# Patient Record
Sex: FEMALE | Race: WHITE | NOT HISPANIC OR LATINO
[De-identification: names, ages, dates, MRNs, and addresses within clinical notes are randomized per-mention and may not be internally consistent; named-entity substitution may affect disease eponyms.]

---

## 2017-01-03 ENCOUNTER — PRIOR ORIGINAL RECORDS (OUTPATIENT)
Dept: OTHER | Age: 72
End: 2017-01-03

## 2017-01-06 ENCOUNTER — HOSPITAL ENCOUNTER (OUTPATIENT)
Dept: LAB | Facility: HOSPITAL | Age: 72
Discharge: HOME | End: 2017-01-06
Attending: INTERNAL MEDICINE
Payer: MEDICARE

## 2017-01-06 ENCOUNTER — PRIOR ORIGINAL RECORDS (OUTPATIENT)
Dept: OTHER | Age: 72
End: 2017-01-06

## 2017-01-06 LAB
BUN BLD-MCNC: 37 MG/DL (ref 8–20)
CALCIUM BLD-MCNC: 8.7 MG/DL (ref 8.3–10.3)
CHLORIDE: 110 MMOL/L (ref 101–111)
CO2: 26 MMOL/L (ref 22–32)
CREAT BLD-MCNC: 1 MG/DL (ref 0.55–1.02)
GLUCOSE BLD-MCNC: 112 MG/DL (ref 70–99)
POTASSIUM SERPL-SCNC: 4.6 MMOL/L (ref 3.6–5.1)
SODIUM SERPL-SCNC: 144 MMOL/L (ref 136–144)

## 2017-01-06 PROCEDURE — 36415 COLL VENOUS BLD VENIPUNCTURE: CPT | Performed by: INTERNAL MEDICINE

## 2017-01-06 PROCEDURE — 80048 BASIC METABOLIC PNL TOTAL CA: CPT | Performed by: INTERNAL MEDICINE

## 2017-01-10 ENCOUNTER — PRIOR ORIGINAL RECORDS (OUTPATIENT)
Dept: OTHER | Age: 72
End: 2017-01-10

## 2017-01-10 LAB
BUN: 37 MG/DL
CALCIUM: 8.7 MG/DL
CHLORIDE: 110 MEQ/L
CREATININE, SERUM: 1 MG/DL
GLUCOSE: 112 MG/DL
POTASSIUM, SERUM: 4.6 MEQ/L
SODIUM: 144 MEQ/L

## 2017-01-23 ENCOUNTER — PRIOR ORIGINAL RECORDS (OUTPATIENT)
Dept: OTHER | Age: 72
End: 2017-01-23

## 2017-01-23 ENCOUNTER — HOSPITAL ENCOUNTER (OUTPATIENT)
Dept: ULTRASOUND IMAGING | Facility: HOSPITAL | Age: 72
Discharge: HOME | End: 2017-01-23
Attending: INTERNAL MEDICINE
Payer: MEDICARE

## 2017-01-23 DIAGNOSIS — R60.9 EDEMA: ICD-10-CM

## 2017-01-23 DIAGNOSIS — M79.606 LEG PAIN: ICD-10-CM

## 2017-01-23 PROCEDURE — 93971 EXTREMITY STUDY: CPT

## 2017-02-01 ENCOUNTER — HOSPITAL ENCOUNTER (OUTPATIENT)
Dept: LAB | Facility: HOSPITAL | Age: 72
Discharge: HOME | End: 2017-02-01
Attending: FAMILY MEDICINE
Payer: MEDICARE

## 2017-02-01 DIAGNOSIS — Z00.00 ROUTINE GENERAL MEDICAL EXAMINATION AT A HEALTH CARE FACILITY: Primary | ICD-10-CM

## 2017-02-01 LAB
25-HYDROXYVITAMIN D (TOTAL): 46.6 NG/ML (ref 30–100)
ALBUMIN SERPL-MCNC: 3.7 G/DL (ref 3.5–4.8)
ALP LIVER SERPL-CCNC: 191 U/L (ref 55–142)
ALT SERPL-CCNC: 48 U/L (ref 14–54)
AST SERPL-CCNC: 34 U/L (ref 15–41)
BILIRUB SERPL-MCNC: 0.8 MG/DL (ref 0.1–2)
BUN BLD-MCNC: 26 MG/DL (ref 8–20)
CALCIUM BLD-MCNC: 9.5 MG/DL (ref 8.3–10.3)
CHLORIDE: 98 MMOL/L (ref 101–111)
CHOLEST SMN-MCNC: 184 MG/DL (ref ?–200)
CO2: 29 MMOL/L (ref 22–32)
CREAT BLD-MCNC: 0.9 MG/DL (ref 0.55–1.02)
CREAT UR-SCNC: 35.6 MG/DL
EST. AVERAGE GLUCOSE BLD GHB EST-MCNC: 154 MG/DL (ref 68–126)
GLUCOSE BLD-MCNC: 171 MG/DL (ref 70–99)
HBA1C MFR BLD HPLC: 7 % (ref ?–5.7)
HDLC SERPL-MCNC: 62 MG/DL (ref 45–?)
HDLC SERPL: 2.97 {RATIO} (ref ?–4.44)
LDLC SERPL CALC-MCNC: 107 MG/DL (ref ?–130)
M PROTEIN MFR SERPL ELPH: 6.9 G/DL (ref 6.1–8.3)
MICROALBUMIN UR-MCNC: 8.17 MG/DL
MICROALBUMIN/CREAT 24H UR-RTO: 229.5 UG/MG (ref ?–30)
NONHDLC SERPL-MCNC: 122 MG/DL (ref ?–130)
POTASSIUM SERPL-SCNC: 4.6 MMOL/L (ref 3.6–5.1)
SODIUM SERPL-SCNC: 133 MMOL/L (ref 136–144)
TRIGLYCERIDES: 75 MG/DL (ref ?–150)
VLDL: 15 MG/DL (ref 5–40)

## 2017-02-01 PROCEDURE — 82570 ASSAY OF URINE CREATININE: CPT | Performed by: FAMILY MEDICINE

## 2017-02-01 PROCEDURE — 82306 VITAMIN D 25 HYDROXY: CPT | Performed by: FAMILY MEDICINE

## 2017-02-01 PROCEDURE — 83036 HEMOGLOBIN GLYCOSYLATED A1C: CPT | Performed by: FAMILY MEDICINE

## 2017-02-01 PROCEDURE — 80053 COMPREHEN METABOLIC PANEL: CPT | Performed by: FAMILY MEDICINE

## 2017-02-01 PROCEDURE — 82043 UR ALBUMIN QUANTITATIVE: CPT | Performed by: FAMILY MEDICINE

## 2017-02-01 PROCEDURE — 36415 COLL VENOUS BLD VENIPUNCTURE: CPT | Performed by: FAMILY MEDICINE

## 2017-02-01 PROCEDURE — 80061 LIPID PANEL: CPT | Performed by: FAMILY MEDICINE

## 2017-02-02 ENCOUNTER — TELEPHONE (OUTPATIENT)
Dept: FAMILY MEDICINE CLINIC | Facility: CLINIC | Age: 72
End: 2017-02-02

## 2017-02-06 ENCOUNTER — OFFICE VISIT (OUTPATIENT)
Dept: FAMILY MEDICINE CLINIC | Facility: CLINIC | Age: 72
End: 2017-02-06

## 2017-02-06 VITALS
TEMPERATURE: 99 F | SYSTOLIC BLOOD PRESSURE: 166 MMHG | BODY MASS INDEX: 30 KG/M2 | HEART RATE: 84 BPM | HEIGHT: 62 IN | DIASTOLIC BLOOD PRESSURE: 64 MMHG | WEIGHT: 163 LBS | RESPIRATION RATE: 20 BRPM

## 2017-02-06 DIAGNOSIS — E11.59 TYPE 2 DIABETES MELLITUS WITH OTHER CIRCULATORY COMPLICATION, WITH LONG-TERM CURRENT USE OF INSULIN (HCC): Primary | ICD-10-CM

## 2017-02-06 DIAGNOSIS — I10 ESSENTIAL HYPERTENSION: ICD-10-CM

## 2017-02-06 DIAGNOSIS — I73.9 PERIPHERAL VASCULAR DISEASE OF LOWER EXTREMITY (HCC): ICD-10-CM

## 2017-02-06 DIAGNOSIS — I25.810 CORONARY ARTERY DISEASE INVOLVING CORONARY BYPASS GRAFT OF NATIVE HEART WITHOUT ANGINA PECTORIS: ICD-10-CM

## 2017-02-06 DIAGNOSIS — Z79.4 TYPE 2 DIABETES MELLITUS WITH OTHER CIRCULATORY COMPLICATION, WITH LONG-TERM CURRENT USE OF INSULIN (HCC): Primary | ICD-10-CM

## 2017-02-06 DIAGNOSIS — Z98.890 H/O PERIPHERAL ARTERY BYPASS: ICD-10-CM

## 2017-02-06 PROCEDURE — 99214 OFFICE O/P EST MOD 30 MIN: CPT | Performed by: FAMILY MEDICINE

## 2017-02-06 RX ORDER — CEPHALEXIN 500 MG/1
CAPSULE ORAL
Refills: 0 | COMMUNITY
Start: 2017-01-23 | End: 2017-04-27

## 2017-02-06 RX ORDER — HYDROCHLOROTHIAZIDE 25 MG/1
TABLET ORAL
Refills: 0 | COMMUNITY
Start: 2017-01-23 | End: 2017-04-20

## 2017-02-06 RX ORDER — IRBESARTAN 300 MG/1
150 TABLET ORAL DAILY
Refills: 3 | COMMUNITY
Start: 2017-01-16 | End: 2017-08-28

## 2017-02-10 PROBLEM — H43.812 PVD (POSTERIOR VITREOUS DETACHMENT), LEFT: Status: ACTIVE | Noted: 2017-02-10

## 2017-02-24 ENCOUNTER — PRIOR ORIGINAL RECORDS (OUTPATIENT)
Dept: OTHER | Age: 72
End: 2017-02-24

## 2017-03-03 ENCOUNTER — HOSPITAL ENCOUNTER (OUTPATIENT)
Dept: CARDIOLOGY CLINIC | Facility: HOSPITAL | Age: 72
Discharge: HOME | End: 2017-03-03
Attending: INTERNAL MEDICINE

## 2017-03-03 DIAGNOSIS — I73.9 PERIPHERAL VASCULAR DISEASE, UNSPECIFIED (HCC): ICD-10-CM

## 2017-03-07 ENCOUNTER — MYAURORA ACCOUNT LINK (OUTPATIENT)
Dept: OTHER | Age: 72
End: 2017-03-07

## 2017-03-07 ENCOUNTER — PRIOR ORIGINAL RECORDS (OUTPATIENT)
Dept: OTHER | Age: 72
End: 2017-03-07

## 2017-03-08 ENCOUNTER — MED REC SCAN ONLY (OUTPATIENT)
Dept: FAMILY MEDICINE CLINIC | Facility: CLINIC | Age: 72
End: 2017-03-08

## 2017-03-14 ENCOUNTER — PRIOR ORIGINAL RECORDS (OUTPATIENT)
Dept: OTHER | Age: 72
End: 2017-03-14

## 2017-03-23 RX ORDER — INSULIN DETEMIR 100 [IU]/ML
INJECTION, SOLUTION SUBCUTANEOUS
Qty: 15 ML | Refills: 1 | Status: SHIPPED | OUTPATIENT
Start: 2017-03-23 | End: 2017-03-24

## 2017-04-20 ENCOUNTER — PRIOR ORIGINAL RECORDS (OUTPATIENT)
Dept: OTHER | Age: 72
End: 2017-04-20

## 2017-04-20 ENCOUNTER — HOSPITAL ENCOUNTER (OUTPATIENT)
Dept: GENERAL RADIOLOGY | Age: 72
Discharge: HOME | End: 2017-04-20
Attending: FAMILY MEDICINE
Payer: MEDICARE

## 2017-04-20 ENCOUNTER — OFFICE VISIT (OUTPATIENT)
Dept: FAMILY MEDICINE CLINIC | Facility: CLINIC | Age: 72
End: 2017-04-20

## 2017-04-20 VITALS
HEART RATE: 84 BPM | DIASTOLIC BLOOD PRESSURE: 80 MMHG | SYSTOLIC BLOOD PRESSURE: 138 MMHG | WEIGHT: 169.81 LBS | HEIGHT: 62 IN | BODY MASS INDEX: 31.25 KG/M2 | RESPIRATION RATE: 20 BRPM | TEMPERATURE: 99 F

## 2017-04-20 DIAGNOSIS — I70.229 EXTREMITY ATHEROSCLEROSIS WITH RESTING PAIN (HCC): ICD-10-CM

## 2017-04-20 DIAGNOSIS — M43.16 SPONDYLOLISTHESIS OF LUMBAR REGION: ICD-10-CM

## 2017-04-20 DIAGNOSIS — Z91.81 AT RISK FOR FALLING: ICD-10-CM

## 2017-04-20 DIAGNOSIS — Z98.890 H/O PERIPHERAL ARTERY BYPASS: ICD-10-CM

## 2017-04-20 DIAGNOSIS — I10 ESSENTIAL HYPERTENSION: ICD-10-CM

## 2017-04-20 DIAGNOSIS — G89.4 CHRONIC PAIN SYNDROME: ICD-10-CM

## 2017-04-20 DIAGNOSIS — M79.7 FIBROMYALGIA: ICD-10-CM

## 2017-04-20 DIAGNOSIS — I73.9 CLAUDICATION (HCC): ICD-10-CM

## 2017-04-20 DIAGNOSIS — R06.02 SOB (SHORTNESS OF BREATH): ICD-10-CM

## 2017-04-20 DIAGNOSIS — I25.810 CORONARY ARTERY DISEASE INVOLVING CORONARY BYPASS GRAFT OF NATIVE HEART WITHOUT ANGINA PECTORIS: ICD-10-CM

## 2017-04-20 DIAGNOSIS — I89.0 LYMPHEDEMA: ICD-10-CM

## 2017-04-20 DIAGNOSIS — E66.9 OBESITY (BMI 30.0-34.9): ICD-10-CM

## 2017-04-20 DIAGNOSIS — I87.8 VENOUS STASIS: ICD-10-CM

## 2017-04-20 DIAGNOSIS — E11.3292 MILD NONPROLIFERATIVE DIABETIC RETINOPATHY OF LEFT EYE WITHOUT MACULAR EDEMA ASSOCIATED WITH TYPE 2 DIABETES MELLITUS (HCC): ICD-10-CM

## 2017-04-20 DIAGNOSIS — I25.10 CORONARY ARTERY DISEASE INVOLVING NATIVE CORONARY ARTERY OF NATIVE HEART WITHOUT ANGINA PECTORIS: ICD-10-CM

## 2017-04-20 DIAGNOSIS — I73.9 PERIPHERAL VASCULAR DISEASE OF LOWER EXTREMITY (HCC): ICD-10-CM

## 2017-04-20 DIAGNOSIS — I77.9 PAOD (PERIPHERAL ARTERIAL OCCLUSIVE DISEASE) (HCC): ICD-10-CM

## 2017-04-20 DIAGNOSIS — F32.0 MILD SINGLE CURRENT EPISODE OF MAJOR DEPRESSIVE DISORDER (HCC): ICD-10-CM

## 2017-04-20 DIAGNOSIS — H31.012: ICD-10-CM

## 2017-04-20 DIAGNOSIS — Z00.00 ENCOUNTER FOR ANNUAL HEALTH EXAMINATION: Primary | ICD-10-CM

## 2017-04-20 DIAGNOSIS — Z79.4 TYPE 2 DIABETES MELLITUS WITH DIABETIC NEUROPATHY, WITH LONG-TERM CURRENT USE OF INSULIN (HCC): ICD-10-CM

## 2017-04-20 DIAGNOSIS — Z96.1 PSEUDOPHAKIA: ICD-10-CM

## 2017-04-20 DIAGNOSIS — E08.51 DIABETES MELLITUS DUE TO UNDERLYING CONDITION WITH DIABETIC PERIPHERAL ANGIOPATHY WITHOUT GANGRENE, WITH LONG-TERM CURRENT USE OF INSULIN (HCC): ICD-10-CM

## 2017-04-20 DIAGNOSIS — Z12.31 ENCOUNTER FOR SCREENING MAMMOGRAM FOR HIGH-RISK PATIENT: ICD-10-CM

## 2017-04-20 DIAGNOSIS — N83.8 OVARIAN MASS: ICD-10-CM

## 2017-04-20 DIAGNOSIS — Z79.4 DIABETES MELLITUS DUE TO UNDERLYING CONDITION WITH DIABETIC PERIPHERAL ANGIOPATHY WITHOUT GANGRENE, WITH LONG-TERM CURRENT USE OF INSULIN (HCC): ICD-10-CM

## 2017-04-20 DIAGNOSIS — H43.812 PVD (POSTERIOR VITREOUS DETACHMENT), LEFT: ICD-10-CM

## 2017-04-20 DIAGNOSIS — E11.40 TYPE 2 DIABETES MELLITUS WITH DIABETIC NEUROPATHY, WITH LONG-TERM CURRENT USE OF INSULIN (HCC): ICD-10-CM

## 2017-04-20 DIAGNOSIS — M48.061 SPINAL STENOSIS OF LUMBAR REGION: ICD-10-CM

## 2017-04-20 DIAGNOSIS — R39.9 URINARY SYMPTOM OR SIGN: ICD-10-CM

## 2017-04-20 PROCEDURE — 81003 URINALYSIS AUTO W/O SCOPE: CPT | Performed by: FAMILY MEDICINE

## 2017-04-20 PROCEDURE — 99214 OFFICE O/P EST MOD 30 MIN: CPT | Performed by: FAMILY MEDICINE

## 2017-04-20 PROCEDURE — 87086 URINE CULTURE/COLONY COUNT: CPT | Performed by: FAMILY MEDICINE

## 2017-04-20 PROCEDURE — G0439 PPPS, SUBSEQ VISIT: HCPCS | Performed by: FAMILY MEDICINE

## 2017-04-20 PROCEDURE — 71020 XR CHEST PA + LAT CHEST (CPT=71020): CPT

## 2017-04-23 PROBLEM — I70.229 EXTREMITY ATHEROSCLEROSIS WITH RESTING PAIN (HCC): Status: ACTIVE | Noted: 2017-04-23

## 2017-04-23 PROBLEM — I77.9 PAOD (PERIPHERAL ARTERIAL OCCLUSIVE DISEASE) (HCC): Status: ACTIVE | Noted: 2017-04-23

## 2017-04-24 ENCOUNTER — HOSPITAL ENCOUNTER (OUTPATIENT)
Dept: LAB | Facility: HOSPITAL | Age: 72
Discharge: HOME | End: 2017-04-24
Attending: INTERNAL MEDICINE
Payer: MEDICARE

## 2017-04-24 ENCOUNTER — HOSPITAL ENCOUNTER (OUTPATIENT)
Dept: CV DIAGNOSTICS | Facility: HOSPITAL | Age: 72
Discharge: HOME | End: 2017-04-24
Attending: INTERNAL MEDICINE
Payer: MEDICARE

## 2017-04-24 PROCEDURE — 93010 ELECTROCARDIOGRAM REPORT: CPT | Performed by: INTERNAL MEDICINE

## 2017-04-24 PROCEDURE — 93005 ELECTROCARDIOGRAM TRACING: CPT

## 2017-04-24 NOTE — PATIENT INSTRUCTIONS
Anne Barger's SCREENING SCHEDULE   Tests on this list are recommended by your physician but may not be covered, or covered at this frequency, by your insurer. Please check with your insurance carrier before scheduling to verify coverage.    PREVENTATIVE ages 73-68) IPPE only No results found for this or any previous visit.  Limited to patients who meet one of the following criteria:   • Men who are 73-68 years old and have smoked more than 100 cigarettes in their lifetime   • Anyone with a family history least age 76, and as needed after 76 Mammogram,1 Yr due on 04/16/2017 Please get this Mammogram regularly   Immunizations      Influenza  Covered Annually No orders found for this or any previous visit.  Please get every year    Pneumococcal 13 (Prevnar)  C printer. (the forms are also available in [address])  [email or web address]  This link also has information from the [facility] regarding Advance Directives.

## 2017-04-24 NOTE — PROGRESS NOTES
HPI:   Alfonzo Reynaga is a 66year old female who presents for a Medicare Subsequent Annual Wellness visit (Pt already had Initial Annual Wellness).     Pt also here to follow up on DM, PVD, LE claudication and depression     Her last annual assessment has left eye without macular edema associated with type 2 diabetes mellitus ([facility] [address]- HgA1c at goal      PAOD (peripheral arterial occlusive disease) [facility])- sees cardiology      Extremity atherosclerosis with resting pain [facility])- sees cardiology       Last Cholest Tablet 24 Hr Take 1 tablet by mouth Daily Beta Blocker. Probiotic Product (PROBIOTIC DAILY OR) Take 2 capsules by mouth daily.       MEDICAL INFORMATION:   She  has a past medical history of BACK PAIN; DIABETES; HTN (hypertension) (7/11/2013); CAD (corona denies chest pain on exertion  GI: denies abdominal pain, denies heartburn  : denies dysuria, vaginal discharge or itching, no complaint of urinary incontinence   MUSCULOSKELETAL: denies back pain  NEURO: denies headaches  PSYCHE: denies depression or an 2+ and symmetric   Skin: Skin color, texture, turgor normal, no rashes or lesions   Lymph nodes: Cervical, supraclavicular, and axillary nodes normal   Neurologic: Normal    and Breasts:  normal appearance, no masses or tenderness, Inspection negative, No repeat labs   -     Detailed, Mod Complex (25198)    Fibromyalgia- stable ; monitor   -     Detailed, Mod Complex (91608)    Peripheral vascular disease of lower extremity ([facility] [address]- pt sees cardiology   -     Detailed, Mod Complex (07086)    H/O peripheral ar resting pain [facility])- pt sees cardiology   -     Detailed, Mod Complex (78856)    Obesity (BMI 30.0-34.9)- discussed diet and exercise   -     Detailed, Mod Complex (90355)         Ms. Wicho Gupta already takes aspirin and has it on her medication list.     Diet a Assessment     Do you have 3 or more medical conditions?: 1-Yes    Have you fallen in the last 12 months?: 1-Yes    Do you accidently lose urine?: 1-Yes    Do you have difficulty seeing?: 1-Yes    Do you have any difficulty walking or getting up?: 1-Yes Preventative Physical Exam only, or if medically necessary Electrocardiogram date10/25/2016       Colorectal Cancer Screening      Colonoscopy Screen every 10 years Colonoscopy,10 Years due on 04/13/2026 Update Health Maintenance if applicable    Flex Sigm with metal- TD and TDaP Not covered by Medicare Part B No vaccine history found This may be covered with your prescription benefits, but Medicare does not cover unless Medically needed    Zoster  Not covered by Medicare Part B No vaccine history found This

## 2017-04-25 ENCOUNTER — HOSPITAL ENCOUNTER (OUTPATIENT)
Dept: LAB | Facility: HOSPITAL | Age: 72
Discharge: HOME | End: 2017-04-25
Attending: INTERNAL MEDICINE
Payer: MEDICARE

## 2017-04-25 ENCOUNTER — PRIOR ORIGINAL RECORDS (OUTPATIENT)
Dept: OTHER | Age: 72
End: 2017-04-25

## 2017-04-25 PROCEDURE — 80061 LIPID PANEL: CPT | Performed by: INTERNAL MEDICINE

## 2017-04-25 PROCEDURE — 36415 COLL VENOUS BLD VENIPUNCTURE: CPT | Performed by: INTERNAL MEDICINE

## 2017-04-25 PROCEDURE — 85025 COMPLETE CBC W/AUTO DIFF WBC: CPT | Performed by: INTERNAL MEDICINE

## 2017-04-26 ENCOUNTER — PRIOR ORIGINAL RECORDS (OUTPATIENT)
Dept: OTHER | Age: 72
End: 2017-04-26

## 2017-04-26 LAB
CHOLESTEROL, TOTAL: 206 MG/DL
HDL CHOLESTEROL: 62 MG/DL
HEMATOCRIT: 38.6 %
HEMOGLOBIN: 12.6 G/DL
LDL CHOLESTEROL: 132 MG/DL
PLATELETS: 259 K/UL
RED BLOOD COUNT: 4.25 X 10-6/U
TRIGLYCERIDES: 61 MG/DL
WHITE BLOOD COUNT: 8.4 X 10-3/U

## 2017-05-01 ENCOUNTER — HOSPITAL ENCOUNTER (OUTPATIENT)
Dept: INTERVENTIONAL RADIOLOGY/VASCULAR | Facility: HOSPITAL | Age: 72
Discharge: HOME | End: 2017-05-01
Attending: INTERNAL MEDICINE | Admitting: INTERNAL MEDICINE
Payer: MEDICARE

## 2017-05-01 ENCOUNTER — PRIOR ORIGINAL RECORDS (OUTPATIENT)
Dept: OTHER | Age: 72
End: 2017-05-01

## 2017-05-01 VITALS
SYSTOLIC BLOOD PRESSURE: 135 MMHG | WEIGHT: 170 LBS | HEIGHT: 62 IN | OXYGEN SATURATION: 94 % | TEMPERATURE: 97 F | RESPIRATION RATE: 17 BRPM | DIASTOLIC BLOOD PRESSURE: 55 MMHG | HEART RATE: 87 BPM | BODY MASS INDEX: 31.28 KG/M2

## 2017-05-01 DIAGNOSIS — I73.9 PVD (PERIPHERAL VASCULAR DISEASE) (HCC): ICD-10-CM

## 2017-05-01 PROCEDURE — 99152 MOD SED SAME PHYS/QHP 5/>YRS: CPT

## 2017-05-01 PROCEDURE — 76942 ECHO GUIDE FOR BIOPSY: CPT

## 2017-05-01 PROCEDURE — 4A023N7 MEASUREMENT OF CARDIAC SAMPLING AND PRESSURE, LEFT HEART, PERCUTANEOUS APPROACH: ICD-10-PCS | Performed by: INTERNAL MEDICINE

## 2017-05-01 PROCEDURE — B211YZZ FLUOROSCOPY OF MULTIPLE CORONARY ARTERIES USING OTHER CONTRAST: ICD-10-PCS | Performed by: INTERNAL MEDICINE

## 2017-05-01 PROCEDURE — 80048 BASIC METABOLIC PNL TOTAL CA: CPT | Performed by: INTERNAL MEDICINE

## 2017-05-01 PROCEDURE — 93454 CORONARY ARTERY ANGIO S&I: CPT

## 2017-05-01 PROCEDURE — 36415 COLL VENOUS BLD VENIPUNCTURE: CPT

## 2017-05-01 PROCEDURE — B41DYZZ FLUOROSCOPY OF AORTA AND BILATERAL LOWER EXTREMITY ARTERIES USING OTHER CONTRAST: ICD-10-PCS | Performed by: INTERNAL MEDICINE

## 2017-05-01 PROCEDURE — B410YZZ FLUOROSCOPY OF ABDOMINAL AORTA USING OTHER CONTRAST: ICD-10-PCS | Performed by: INTERNAL MEDICINE

## 2017-05-01 PROCEDURE — 82962 GLUCOSE BLOOD TEST: CPT

## 2017-05-01 PROCEDURE — 75710 ARTERY X-RAYS ARM/LEG: CPT

## 2017-05-01 PROCEDURE — 36247 INS CATH ABD/L-EXT ART 3RD: CPT

## 2017-05-01 PROCEDURE — 99153 MOD SED SAME PHYS/QHP EA: CPT

## 2017-05-01 PROCEDURE — 93458 L HRT ARTERY/VENTRICLE ANGIO: CPT

## 2017-05-01 RX ORDER — SODIUM CHLORIDE 9 MG/ML
INJECTION, SOLUTION INTRAVENOUS CONTINUOUS
Status: DISCONTINUED | OUTPATIENT
Start: 2017-05-01 | End: 2017-05-01

## 2017-05-01 RX ORDER — HEPARIN SODIUM 5000 [USP'U]/ML
INJECTION, SOLUTION INTRAVENOUS; SUBCUTANEOUS
Status: COMPLETED
Start: 2017-05-01 | End: 2017-05-01

## 2017-05-01 RX ORDER — LIDOCAINE HYDROCHLORIDE 10 MG/ML
INJECTION, SOLUTION INFILTRATION; PERINEURAL
Status: COMPLETED
Start: 2017-05-01 | End: 2017-05-01

## 2017-05-01 RX ORDER — MIDAZOLAM HYDROCHLORIDE 1 MG/ML
INJECTION INTRAMUSCULAR; INTRAVENOUS
Status: COMPLETED
Start: 2017-05-01 | End: 2017-05-01

## 2017-05-01 RX ORDER — HYDRALAZINE HYDROCHLORIDE 20 MG/ML
INJECTION INTRAMUSCULAR; INTRAVENOUS
Status: COMPLETED
Start: 2017-05-01 | End: 2017-05-01

## 2017-05-01 RX ADMIN — SODIUM CHLORIDE: 9 INJECTION, SOLUTION INTRAVENOUS at 09:00:00

## 2017-05-01 NOTE — PROGRESS NOTES
Pt ambulated in kim, l groin remains c/d/i and soft. Discharge instructions reviewed per Cuca Ochoa. Pt without questions.  Discharged to home in stable condition

## 2017-05-01 NOTE — PROCEDURES
[facility]    PATIENT'S NAME: Darline Washburn   ATTENDING PHYSICIAN: Elizabeth Martínez M.D. OPERATING PHYSICIAN: Elizabeth Martínez M.D.    PATIENT ACCOUNT#:   [de-identified]    LOCATION:  [facility] EDWP 10  MEDICAL RECORD #:   [id number]       DATE OF BIR left main trifurcates into left anterior descending, circumflex, and ramus intermedius branches. Stents are noted from the ostium of the left anterior descending artery down to its midsection. These appeared patent without stenosis proximally.   In the mi valve.    ABDOMINAL ANGIOGRAPHY:  Abdominal angiography with a pigtail at the renal vessels revealed a mildly atherosclerotic aorta. Right and left common iliacs are widely patent. External iliacs are patent. Internal iliacs are patent.   The common femo iliac artery. Angiography revealed no significant external iliac disease or common femoral disease. As noted before, the superficial femoral is widely patent in its proximal and midsection. In its distal section, stenosis of about 30%.   There is also a

## 2017-05-01 NOTE — H&P
History & Physical Examination    Patient Name: Yves Contreras  MRN: [id number]  CSN: [id number]  YOB: 1945    Diagnosis: Abnl LE US and dyspnea. Present Illness: Pt with right fem pop and US, suggestive of outflow stenosis.   Dr. Inés Rodrigues has re Corazon*    Pain  Cortisone [Cortison*      Coumadin [Warfarin]         Comment:Pt says she developed sores  Gluten Flour            Other (See Comments)    Comment:Pt feels gluten elevates her blood sugar  Lyrica [Pregabalin]         Comment:Tremor, dizziness stent    ANGIOPLASTY (CORONARY)  5/12/15    Comment right popliteal    CATH DRUG ELUTING STENT      CATH PERCUTANEOUS  TRANSLUMINAL CORONARY ANGIOPLASTY      TONSILLECTOMY             Family History   Problem Relation Age of Onset   • Heart Disord

## 2017-05-01 NOTE — PROGRESS NOTES
Rc'd pt from cath lab in stable condition. VSS. Sheath to left groin intact. Pulled using sterile technique, manual pressure applied for 20 mins using a quick clot. Site is soft, clean and dry, no bleeding or hematoma. Pt on bedrest for 4hrs.  Denies c/o pa

## 2017-05-02 LAB
BUN: 28 MG/DL
CALCIUM: 9.1 MG/DL
CHLORIDE: 114 MEQ/L
CREATININE, SERUM: 0.81 MG/DL
GLUCOSE: 92 MG/DL
POTASSIUM, SERUM: 4.5 MEQ/L
SODIUM: 144 MEQ/L

## 2017-05-25 ENCOUNTER — HOSPITAL ENCOUNTER (OUTPATIENT)
Dept: LAB | Facility: HOSPITAL | Age: 72
Discharge: HOME | End: 2017-05-25
Attending: FAMILY MEDICINE
Payer: MEDICARE

## 2017-05-25 DIAGNOSIS — Z79.4 TYPE 2 DIABETES MELLITUS WITH DIABETIC NEUROPATHY, WITH LONG-TERM CURRENT USE OF INSULIN (HCC): ICD-10-CM

## 2017-05-25 DIAGNOSIS — R06.02 SOB (SHORTNESS OF BREATH): ICD-10-CM

## 2017-05-25 DIAGNOSIS — E11.40 TYPE 2 DIABETES MELLITUS WITH DIABETIC NEUROPATHY, WITH LONG-TERM CURRENT USE OF INSULIN (HCC): ICD-10-CM

## 2017-05-25 PROCEDURE — 83036 HEMOGLOBIN GLYCOSYLATED A1C: CPT

## 2017-05-25 PROCEDURE — 36415 COLL VENOUS BLD VENIPUNCTURE: CPT

## 2017-05-25 PROCEDURE — 80053 COMPREHEN METABOLIC PANEL: CPT

## 2017-05-30 ENCOUNTER — HOSPITAL ENCOUNTER (OUTPATIENT)
Dept: MAMMOGRAPHY | Facility: HOSPITAL | Age: 72
Discharge: HOME | End: 2017-05-30
Attending: FAMILY MEDICINE
Payer: MEDICARE

## 2017-05-30 ENCOUNTER — HOSPITAL ENCOUNTER (OUTPATIENT)
Dept: ULTRASOUND IMAGING | Facility: HOSPITAL | Age: 72
Discharge: HOME | End: 2017-05-30
Attending: FAMILY MEDICINE
Payer: MEDICARE

## 2017-05-30 DIAGNOSIS — N83.8 OVARIAN MASS: ICD-10-CM

## 2017-05-30 DIAGNOSIS — Z12.31 ENCOUNTER FOR SCREENING MAMMOGRAM FOR HIGH-RISK PATIENT: ICD-10-CM

## 2017-05-30 PROCEDURE — 77063 BREAST TOMOSYNTHESIS BI: CPT | Performed by: FAMILY MEDICINE

## 2017-05-30 PROCEDURE — 76856 US EXAM PELVIC COMPLETE: CPT | Performed by: FAMILY MEDICINE

## 2017-05-30 PROCEDURE — 76830 TRANSVAGINAL US NON-OB: CPT | Performed by: FAMILY MEDICINE

## 2017-05-30 PROCEDURE — 77067 SCR MAMMO BI INCL CAD: CPT | Performed by: FAMILY MEDICINE

## 2017-08-18 ENCOUNTER — TELEPHONE (OUTPATIENT)
Dept: FAMILY MEDICINE CLINIC | Facility: CLINIC | Age: 72
End: 2017-08-18

## 2017-08-18 ENCOUNTER — HOSPITAL ENCOUNTER (EMERGENCY)
Facility: HOSPITAL | Age: 72
Discharge: HOME | End: 2017-08-18
Attending: EMERGENCY MEDICINE
Payer: MEDICARE

## 2017-08-18 ENCOUNTER — APPOINTMENT (OUTPATIENT)
Dept: CT IMAGING | Facility: HOSPITAL | Age: 72
End: 2017-08-18
Attending: EMERGENCY MEDICINE
Payer: MEDICARE

## 2017-08-18 ENCOUNTER — PRIOR ORIGINAL RECORDS (OUTPATIENT)
Dept: OTHER | Age: 72
End: 2017-08-18

## 2017-08-18 VITALS
TEMPERATURE: 98 F | SYSTOLIC BLOOD PRESSURE: 173 MMHG | OXYGEN SATURATION: 94 % | RESPIRATION RATE: 16 BRPM | DIASTOLIC BLOOD PRESSURE: 55 MMHG | HEART RATE: 75 BPM

## 2017-08-18 DIAGNOSIS — N30.00 ACUTE CYSTITIS WITHOUT HEMATURIA: ICD-10-CM

## 2017-08-18 DIAGNOSIS — I10 ESSENTIAL HYPERTENSION: ICD-10-CM

## 2017-08-18 DIAGNOSIS — G43.009 MIGRAINE WITHOUT AURA AND WITHOUT STATUS MIGRAINOSUS, NOT INTRACTABLE: Primary | ICD-10-CM

## 2017-08-18 LAB
ALBUMIN SERPL-MCNC: 3.3 G/DL (ref 3.5–4.8)
ALP LIVER SERPL-CCNC: 125 U/L (ref 55–142)
ALT SERPL-CCNC: 24 U/L (ref 14–54)
AST SERPL-CCNC: 34 U/L (ref 15–41)
BASOPHILS # BLD AUTO: 0.09 X10(3) UL (ref 0–0.1)
BASOPHILS NFR BLD AUTO: 0.8 %
BILIRUB SERPL-MCNC: 1.2 MG/DL (ref 0.1–2)
BILIRUB UR QL STRIP.AUTO: NEGATIVE
BUN BLD-MCNC: 14 MG/DL (ref 8–20)
CALCIUM BLD-MCNC: 9 MG/DL (ref 8.3–10.3)
CHLORIDE: 102 MMOL/L (ref 101–111)
CO2: 26 MMOL/L (ref 22–32)
CREAT BLD-MCNC: 0.72 MG/DL (ref 0.55–1.02)
EOSINOPHIL # BLD AUTO: 0.28 X10(3) UL (ref 0–0.3)
EOSINOPHIL NFR BLD AUTO: 2.6 %
ERYTHROCYTE [DISTWIDTH] IN BLOOD BY AUTOMATED COUNT: 15.6 % (ref 11.5–16)
GLUCOSE BLD-MCNC: 196 MG/DL (ref 70–99)
GLUCOSE UR STRIP.AUTO-MCNC: NEGATIVE MG/DL
HCT VFR BLD AUTO: 45.1 % (ref 34–50)
HGB BLD-MCNC: 14.7 G/DL (ref 12–16)
HYALINE CASTS #/AREA URNS AUTO: PRESENT /LPF
IMMATURE GRANULOCYTE COUNT: 0.04 X10(3) UL (ref 0–1)
IMMATURE GRANULOCYTE RATIO %: 0.4 %
KETONES UR STRIP.AUTO-MCNC: 20 MG/DL
LYMPHOCYTES # BLD AUTO: 0.59 X10(3) UL (ref 0.9–4)
LYMPHOCYTES NFR BLD AUTO: 5.4 %
M PROTEIN MFR SERPL ELPH: 6.4 G/DL (ref 6.1–8.3)
MCH RBC QN AUTO: 29.3 PG (ref 27–33.2)
MCHC RBC AUTO-ENTMCNC: 32.6 G/DL (ref 31–37)
MCV RBC AUTO: 89.8 FL (ref 81–100)
MONOCYTES # BLD AUTO: 0.26 X10(3) UL (ref 0.1–0.6)
MONOCYTES NFR BLD AUTO: 2.4 %
NEUTROPHIL ABS PRELIM: 9.63 X10 (3) UL (ref 1.3–6.7)
NEUTROPHILS # BLD AUTO: 9.63 X10(3) UL (ref 1.3–6.7)
NEUTROPHILS NFR BLD AUTO: 88.4 %
NITRITE UR QL STRIP.AUTO: NEGATIVE
PH UR STRIP.AUTO: 5 [PH] (ref 4.5–8)
PLATELET # BLD AUTO: 249 10(3)UL (ref 150–450)
POTASSIUM SERPL-SCNC: 4.3 MMOL/L (ref 3.6–5.1)
PROT UR STRIP.AUTO-MCNC: 100 MG/DL
RBC # BLD AUTO: 5.02 X10(6)UL (ref 3.8–5.1)
RBC UR QL AUTO: NEGATIVE
RED CELL DISTRIBUTION WIDTH-SD: 50.4 FL (ref 35.1–46.3)
SODIUM SERPL-SCNC: 137 MMOL/L (ref 136–144)
SP GR UR STRIP.AUTO: 1.02 (ref 1–1.03)
UROBILINOGEN UR STRIP.AUTO-MCNC: <2 MG/DL
WBC # BLD AUTO: 10.9 X10(3) UL (ref 4–13)
WBC #/AREA URNS AUTO: >50 /HPF
WBC CLUMPS UR QL AUTO: PRESENT

## 2017-08-18 PROCEDURE — 87186 SC STD MICRODIL/AGAR DIL: CPT | Performed by: EMERGENCY MEDICINE

## 2017-08-18 PROCEDURE — 85025 COMPLETE CBC W/AUTO DIFF WBC: CPT | Performed by: EMERGENCY MEDICINE

## 2017-08-18 PROCEDURE — 96374 THER/PROPH/DIAG INJ IV PUSH: CPT

## 2017-08-18 PROCEDURE — 87086 URINE CULTURE/COLONY COUNT: CPT | Performed by: EMERGENCY MEDICINE

## 2017-08-18 PROCEDURE — 99284 EMERGENCY DEPT VISIT MOD MDM: CPT

## 2017-08-18 PROCEDURE — 70450 CT HEAD/BRAIN W/O DYE: CPT | Performed by: EMERGENCY MEDICINE

## 2017-08-18 PROCEDURE — 87077 CULTURE AEROBIC IDENTIFY: CPT | Performed by: EMERGENCY MEDICINE

## 2017-08-18 PROCEDURE — 96375 TX/PRO/DX INJ NEW DRUG ADDON: CPT

## 2017-08-18 PROCEDURE — 81001 URINALYSIS AUTO W/SCOPE: CPT | Performed by: EMERGENCY MEDICINE

## 2017-08-18 PROCEDURE — 80053 COMPREHEN METABOLIC PANEL: CPT | Performed by: EMERGENCY MEDICINE

## 2017-08-18 RX ORDER — DIPHENHYDRAMINE HYDROCHLORIDE 50 MG/ML
25 INJECTION INTRAMUSCULAR; INTRAVENOUS ONCE
Status: COMPLETED | OUTPATIENT
Start: 2017-08-18 | End: 2017-08-18

## 2017-08-18 RX ORDER — METOCLOPRAMIDE HYDROCHLORIDE 5 MG/ML
10 INJECTION INTRAMUSCULAR; INTRAVENOUS ONCE
Status: COMPLETED | OUTPATIENT
Start: 2017-08-18 | End: 2017-08-18

## 2017-08-18 RX ORDER — CEPHALEXIN 500 MG/1
500 CAPSULE ORAL 4 TIMES DAILY
Qty: 28 CAPSULE | Refills: 0 | Status: SHIPPED | OUTPATIENT
Start: 2017-08-18 | End: 2017-08-25

## 2017-08-18 RX ORDER — KETOROLAC TROMETHAMINE 30 MG/ML
15 INJECTION, SOLUTION INTRAMUSCULAR; INTRAVENOUS ONCE
Status: COMPLETED | OUTPATIENT
Start: 2017-08-18 | End: 2017-08-18

## 2017-08-18 NOTE — TELEPHONE ENCOUNTER
Pt states she woke up with severe back pain and a bad migraine. Pt has history of kidney infections. Pt want to know if her  can bring a urine sample in. Please advise  Ok to speak to  Karoline iVdal) if he answers.

## 2017-08-18 NOTE — ED NOTES
D/c instructions given to pt, no distress, pt requesting wheelchair out of ED, thanks staff for care.

## 2017-08-18 NOTE — ED PROVIDER NOTES
Patient Seen in: [facility] Emergency Department    History   Patient presents with:  Headache (neurologic)    Stated Complaint: headache    HPI    76-year-old female comes the hospital the chief complaint of having difficulty with a headache.   She STENT  No date: CATH PERCUTANEOUS  TRANSLUMINAL CORONARY ANGIO*  '95: CHOLECYSTECTOMY  abdominoplasty '98: OTHER SURGICAL HISTORY  breast reduction '98: OTHER SURGICAL HISTORY  right fem pop bypass 2/10/15: OTHER SURGICAL HISTORY      Comment: Dr. Clari Bills Smoker                                                              Packs/day: 0.00      Years: 0.00         Quit date: 5/23/1972  Smokeless tobacco: Never Used                      Alcohol use:  No                Review of Systems    Positive for stated co Notable for the following:     RDW-SD 50.4 (*)     Neutrophil Absolute Prelim 9.63 (*)     Neutrophil Absolute 9.63 (*)     Lymphocyte Absolute 0.59 (*)     All other components within normal limits   CBC WITH DIFFERENTIAL WITH PLATELET    Narrative:     T Medications   DiphenhydrAMINE HCl (BENADRYL) injection 25 mg (25 mg Intravenous Given 8/18/17 1445)   Metoclopramide HCl (REGLAN) injection 10 mg (10 mg Intravenous Given 8/18/17 1445)   ketorolac tromethamine (TORADOL) 30 MG/ML injection 15 mg (15 mg In

## 2017-08-18 NOTE — ED INITIAL ASSESSMENT (HPI)
Pt to ED from home with c/o severe headache with nausea since 0400 today. Pt states has frequent mild headaches due to neck problems but this is worse. Pt c/o lower back pain and migel flank pain. Pt denies weakness, visual changes.

## 2017-08-18 NOTE — TELEPHONE ENCOUNTER
Spoke with pt, lower back pain started this morning along with a migraine. Took a Diluadid with no relief. Current pain level for migraine is 9 out of 10. No vision change, no vomiting. Is Nausea. Abdominal discomfort.    Pain upon urinating with frequency,

## 2017-08-25 ENCOUNTER — TELEPHONE (OUTPATIENT)
Dept: FAMILY MEDICINE CLINIC | Facility: CLINIC | Age: 72
End: 2017-08-25

## 2017-08-25 NOTE — TELEPHONE ENCOUNTER
Called pt, history of bladder problems \"so it is hard to tell you my symptoms now as I always have frequency of urination\". Scheduled 8-28-17 MATY follow up. Task completed.

## 2017-08-25 NOTE — TELEPHONE ENCOUNTER
TAKING MED FOR UTI AND WILL TAKE THE LAST ONE AT MIDNIGHT TONIGHT. WANTS TO KNOW IF SHE NEEDS AN ORDER FOR ANOTHER TEST TO SEE IF SHE IF SHE WILL NEED MORE MEDS. PLS CALL HER BACK TO ADVISE.

## 2017-08-28 ENCOUNTER — OFFICE VISIT (OUTPATIENT)
Dept: FAMILY MEDICINE CLINIC | Facility: CLINIC | Age: 72
End: 2017-08-28

## 2017-08-28 ENCOUNTER — LAB ENCOUNTER (OUTPATIENT)
Dept: LAB | Age: 72
End: 2017-08-28
Attending: INTERNAL MEDICINE
Payer: MEDICARE

## 2017-08-28 ENCOUNTER — TELEPHONE (OUTPATIENT)
Dept: FAMILY MEDICINE CLINIC | Facility: CLINIC | Age: 72
End: 2017-08-28

## 2017-08-28 VITALS
WEIGHT: 156 LBS | TEMPERATURE: 98 F | BODY MASS INDEX: 28.71 KG/M2 | SYSTOLIC BLOOD PRESSURE: 154 MMHG | HEART RATE: 76 BPM | DIASTOLIC BLOOD PRESSURE: 72 MMHG | HEIGHT: 62 IN

## 2017-08-28 DIAGNOSIS — Z79.4 TYPE 2 DIABETES MELLITUS WITH DIABETIC NEUROPATHY, WITH LONG-TERM CURRENT USE OF INSULIN (HCC): ICD-10-CM

## 2017-08-28 DIAGNOSIS — N30.01 ACUTE CYSTITIS WITH HEMATURIA: Primary | ICD-10-CM

## 2017-08-28 DIAGNOSIS — E11.40 TYPE 2 DIABETES MELLITUS WITH DIABETIC NEUROPATHY, WITH LONG-TERM CURRENT USE OF INSULIN (HCC): ICD-10-CM

## 2017-08-28 DIAGNOSIS — N30.01 ACUTE CYSTITIS WITH HEMATURIA: ICD-10-CM

## 2017-08-28 LAB
APPEARANCE: CLEAR
BUN BLD-MCNC: 13 MG/DL (ref 8–20)
CALCIUM BLD-MCNC: 9.6 MG/DL (ref 8.3–10.3)
CHLORIDE: 104 MMOL/L (ref 101–111)
CO2: 29 MMOL/L (ref 22–32)
CREAT BLD-MCNC: 0.66 MG/DL (ref 0.55–1.02)
EST. AVERAGE GLUCOSE BLD GHB EST-MCNC: 143 MG/DL (ref 68–126)
GLUCOSE BLD-MCNC: 125 MG/DL (ref 70–99)
HBA1C MFR BLD HPLC: 6.6 % (ref ?–5.7)
MULTISTIX LOT#: ABNORMAL NUMERIC
PH, URINE: 5 (ref 4.5–8)
POTASSIUM SERPL-SCNC: 4.3 MMOL/L (ref 3.6–5.1)
PROTEIN (URINE DIPSTICK): 30 MG/DL
SODIUM SERPL-SCNC: 141 MMOL/L (ref 136–144)
SPECIFIC GRAVITY: 1.01 (ref 1–1.03)
URINE-COLOR: YELLOW
UROBILINOGEN,SEMI-QN: 0.2 MG/DL (ref 0–1.9)

## 2017-08-28 PROCEDURE — 36415 COLL VENOUS BLD VENIPUNCTURE: CPT

## 2017-08-28 PROCEDURE — 83036 HEMOGLOBIN GLYCOSYLATED A1C: CPT

## 2017-08-28 PROCEDURE — 87086 URINE CULTURE/COLONY COUNT: CPT | Performed by: INTERNAL MEDICINE

## 2017-08-28 PROCEDURE — 80048 BASIC METABOLIC PNL TOTAL CA: CPT

## 2017-08-28 PROCEDURE — 82570 ASSAY OF URINE CREATININE: CPT | Performed by: INTERNAL MEDICINE

## 2017-08-28 PROCEDURE — 82043 UR ALBUMIN QUANTITATIVE: CPT | Performed by: INTERNAL MEDICINE

## 2017-08-28 PROCEDURE — 99214 OFFICE O/P EST MOD 30 MIN: CPT | Performed by: INTERNAL MEDICINE

## 2017-08-28 PROCEDURE — 81003 URINALYSIS AUTO W/O SCOPE: CPT | Performed by: INTERNAL MEDICINE

## 2017-08-28 RX ORDER — INSULIN DETEMIR 100 [IU]/ML
INJECTION, SOLUTION SUBCUTANEOUS
Qty: 30 ML | Refills: 1 | OUTPATIENT
Start: 2017-08-28

## 2017-08-28 NOTE — PROGRESS NOTES
Jade Munroe is a 67year old female. HPI:   Here for follow up UTI. Went into the ER recently for headache when labs indicated an infection. No symptoms. Finished her Keflex 4 days ago.  Reports that she needs another round of it otherwise it will go to h Coronary atherosclerosis    • Deep vein thrombosis (HCC)     right leg   • DIABETES    • Elevated cholesterol    • Fibromyalgia    • Heart attack (HCC)    • Heart disease    • High blood pressure    • History of blood transfusion    • HTN (hypertension) 7/ for culture. Push fluids, keep sugars manageable. Type 2 diabetes mellitus with diabetic neuropathy, with long-term current use of insulin ([facility] [address] - recheck A1C today. Continue BID accuchecks. Adjust Levemir according to future A1C.  No refills needed at [facility]

## 2017-08-29 LAB
CREAT UR-SCNC: 25.5 MG/DL
MICROALBUMIN UR-MCNC: 32.5 MG/DL
MICROALBUMIN/CREAT 24H UR-RTO: 1274.5 UG/MG (ref ?–30)

## 2017-08-29 NOTE — TELEPHONE ENCOUNTER
Spoke with pt, 2 years ago had a ByPass and Dilaudid was given for pain. Pt states it is the only pain med that works for her. She uses it for Migraines and Fibromyalgia.  She states she uses it rarely and remembers once giving MATY over 100tablets to dispose

## 2017-09-08 ENCOUNTER — PRIOR ORIGINAL RECORDS (OUTPATIENT)
Dept: OTHER | Age: 72
End: 2017-09-08

## 2017-09-08 LAB
ALBUMIN: 3.3 G/DL
ALKALINE PHOSPHATATE(ALK PHOS): 125 IU/L
BILIRUBIN TOTAL: 1.2 MG/DL
BUN: 14 MG/DL
CALCIUM: 9 MG/DL
CHLORIDE: 102 MEQ/L
CREATININE, SERUM: 0.72 MG/DL
GLUCOSE: 196 MG/DL
HEMATOCRIT: 45.1 %
HEMOGLOBIN: 14.7 G/DL
PLATELETS: 249 K/UL
POTASSIUM, SERUM: 4.3 MEQ/L
PROTEIN, TOTAL: 6.4 G/DL
RED BLOOD COUNT: 5.02 X 10-6/U
SGOT (AST): 34 IU/L
SGPT (ALT): 24 IU/L
SODIUM: 137 MEQ/L
WHITE BLOOD COUNT: 10.9 X 10-3/U

## 2017-09-18 ENCOUNTER — OFFICE VISIT (OUTPATIENT)
Dept: NEPHROLOGY | Facility: CLINIC | Age: 72
End: 2017-09-18

## 2017-09-18 VITALS — SYSTOLIC BLOOD PRESSURE: 170 MMHG | BODY MASS INDEX: 29 KG/M2 | WEIGHT: 156 LBS | DIASTOLIC BLOOD PRESSURE: 84 MMHG

## 2017-09-18 DIAGNOSIS — I10 ESSENTIAL HYPERTENSION: ICD-10-CM

## 2017-09-18 DIAGNOSIS — I15.9 SECONDARY HYPERTENSION: ICD-10-CM

## 2017-09-18 DIAGNOSIS — I25.10 ATHEROSCLEROSIS OF NATIVE CORONARY ARTERY OF NATIVE HEART WITHOUT ANGINA PECTORIS: ICD-10-CM

## 2017-09-18 DIAGNOSIS — R80.9 PROTEINURIA, UNSPECIFIED TYPE: Primary | ICD-10-CM

## 2017-09-18 DIAGNOSIS — I15.8 OTHER SECONDARY HYPERTENSION: ICD-10-CM

## 2017-09-18 PROCEDURE — 99205 OFFICE O/P NEW HI 60 MIN: CPT | Performed by: INTERNAL MEDICINE

## 2017-09-18 NOTE — PROGRESS NOTES
Nephrology Consult Note    REASON FOR CONSULT: Proteinuria / HTN    ASSESSMENT/PLAN:        1) Proteinuria- recently increasing proteinuria likely reflects evolving diabetic nephropathy given her history of labile type 2 diabetes x 38 yrs; this is increase standpoint. She denies any history of acute or chronic renal failure gross or microscopic hematuria kidney stones or infections. She was told she had proteinuria within the last couple of years is increased significantly over the during that time.   She d ANGIO*  '95: CHOLECYSTECTOMY  abdominoplasty '98: OTHER SURGICAL HISTORY  breast reduction '98: OTHER SURGICAL HISTORY  right fem pop bypass 2/10/15: OTHER SURGICAL HISTORY      Comment: Dr. Inés Rodrigues  No date: REDUCTION LEFT Left  No date: REDUCTION RIGHT Rig Comment:Tremor, dizziness  Statins                     Comment:Feet turn black, muscle weakness in the legs  Tylenol W/Codeine [*    Pain  Ultram [Tramadol]       Pain    Social History:  Social History    Marital status:              Spouse name:

## 2017-09-22 ENCOUNTER — HOSPITAL ENCOUNTER (OUTPATIENT)
Dept: ULTRASOUND IMAGING | Facility: HOSPITAL | Age: 72
Discharge: HOME | End: 2017-09-22
Attending: INTERNAL MEDICINE
Payer: MEDICARE

## 2017-09-22 ENCOUNTER — APPOINTMENT (OUTPATIENT)
Dept: LAB | Facility: HOSPITAL | Age: 72
End: 2017-09-22
Attending: INTERNAL MEDICINE
Payer: MEDICARE

## 2017-09-22 DIAGNOSIS — I25.10 ATHEROSCLEROSIS OF NATIVE CORONARY ARTERY OF NATIVE HEART WITHOUT ANGINA PECTORIS: ICD-10-CM

## 2017-09-22 DIAGNOSIS — I10 ESSENTIAL HYPERTENSION: ICD-10-CM

## 2017-09-22 DIAGNOSIS — I15.8 OTHER SECONDARY HYPERTENSION: ICD-10-CM

## 2017-09-22 PROCEDURE — 76775 US EXAM ABDO BACK WALL LIM: CPT | Performed by: INTERNAL MEDICINE

## 2017-09-22 PROCEDURE — 93975 VASCULAR STUDY: CPT | Performed by: INTERNAL MEDICINE

## 2017-09-23 ENCOUNTER — APPOINTMENT (OUTPATIENT)
Dept: LAB | Facility: HOSPITAL | Age: 72
End: 2017-09-23
Attending: INTERNAL MEDICINE
Payer: MEDICARE

## 2017-09-23 DIAGNOSIS — R80.9 PROTEINURIA, UNSPECIFIED TYPE: ICD-10-CM

## 2017-09-23 DIAGNOSIS — I15.9 SECONDARY HYPERTENSION: ICD-10-CM

## 2017-09-23 LAB
COMPLEMENT C3: 81.3 MG/DL (ref 90–180)
COMPLEMENT C4: 21 MG/DL (ref 10–40)

## 2017-09-23 PROCEDURE — 86160 COMPLEMENT ANTIGEN: CPT

## 2017-09-23 PROCEDURE — 36415 COLL VENOUS BLD VENIPUNCTURE: CPT

## 2017-09-23 PROCEDURE — 83876 ASSAY MYELOPEROXIDASE: CPT

## 2017-09-23 PROCEDURE — 84244 ASSAY OF RENIN: CPT

## 2017-09-23 PROCEDURE — 86255 FLUORESCENT ANTIBODY SCREEN: CPT

## 2017-09-23 PROCEDURE — 83516 IMMUNOASSAY NONANTIBODY: CPT

## 2017-09-23 PROCEDURE — 83835 ASSAY OF METANEPHRINES: CPT

## 2017-09-23 PROCEDURE — 82088 ASSAY OF ALDOSTERONE: CPT

## 2017-09-23 PROCEDURE — 83883 ASSAY NEPHELOMETRY NOT SPEC: CPT

## 2017-09-23 PROCEDURE — 86334 IMMUNOFIX E-PHORESIS SERUM: CPT

## 2017-09-23 PROCEDURE — 84165 PROTEIN E-PHORESIS SERUM: CPT

## 2017-09-23 PROCEDURE — 86038 ANTINUCLEAR ANTIBODIES: CPT

## 2017-09-24 LAB
ALDOSTERONE: 9.9 NG/DL
ANA SCREEN: NEGATIVE
RENIN ACTIVITY: 0.5 NG/ML/HR

## 2017-09-26 LAB
METANEPHRINE: <0.1 NMOL/L
NORMETANEPHRINE: 0.36 NMOL/L

## 2017-09-27 LAB
A/G RATIO: 1.85
ALBUMIN, SERUM: 3.5 G/DL (ref 3.5–4.8)
ALPHA-1 GLOBULIN: 0.17 G/DL (ref 0.1–0.3)
ALPHA-2 GLOBULIN: 0.54 G/DL (ref 0.6–1)
BETA GLOBULIN: 0.63 G/DL (ref 0.7–1.2)
GAMMA GLOBULIN: 0.55 G/DL (ref 0.6–1.6)
KAPPA FREE LIGHT CHAIN: 1.38 MG/DL (ref 0.33–1.94)
KAPPA/LAMBDA FLC RATIO: 0.57 (ref 0.26–1.65)
LAMBDA FREE LIGHT CHAIN: 2.42 MG/DL (ref 0.57–2.63)
TOTAL PROTEIN,SERUM: 5.4 G/DL (ref 6.1–8.3)

## 2017-09-29 LAB
MYELOPEROX ANTIBODIES, IGG: 1 AU/ML
SERINE PROTEASE3, IGG: 0 AU/ML

## 2017-10-05 ENCOUNTER — OFFICE VISIT (OUTPATIENT)
Dept: NEPHROLOGY | Facility: CLINIC | Age: 72
End: 2017-10-05

## 2017-10-05 VITALS
RESPIRATION RATE: 16 BRPM | HEART RATE: 68 BPM | DIASTOLIC BLOOD PRESSURE: 72 MMHG | BODY MASS INDEX: 28 KG/M2 | SYSTOLIC BLOOD PRESSURE: 172 MMHG | WEIGHT: 154 LBS

## 2017-10-05 DIAGNOSIS — R80.9 PROTEINURIA, UNSPECIFIED TYPE: Primary | ICD-10-CM

## 2017-10-05 PROCEDURE — 99215 OFFICE O/P EST HI 40 MIN: CPT | Performed by: INTERNAL MEDICINE

## 2017-10-05 RX ORDER — VALSARTAN 80 MG/1
160 TABLET ORAL DAILY
Qty: 30 TABLET | Refills: 11 | Status: SHIPPED | OUTPATIENT
Start: 2017-10-05 | End: 2017-11-04

## 2017-10-05 RX ORDER — AMLODIPINE BESYLATE 5 MG/1
5 TABLET ORAL DAILY
Qty: 30 TABLET | Refills: 11 | Status: SHIPPED | OUTPATIENT
Start: 2017-10-05 | End: 2018-02-26

## 2017-10-05 NOTE — PROGRESS NOTES
Nephrology Progress Note      ASSESSMENT/PLAN:        1) Proteinuria- recently increasing proteinuria likely reflects evolving diabetic nephropathy given her history of labile type 2 diabetes x 38 yrs; this has increased over the last 2 years with malb/cre blood pressure    • History of blood transfusion    • HTN (hypertension) 7/11/2013   • Neuropathy    • Osteoarthritis    • Peripheral vascular disease (HCC)     stents and bypass right leg, 2/2015   • Sleep apnea    • Type II or unspecified type diabetes m tablet Rfl: 11   Biotin 5000 MCG Oral Cap Take 1 tablet by mouth daily. Disp:  Rfl:    Specialty Vitamins Products (INULOSE BLOOD SUGAR SUPPORT) Oral Cap Take 1 capsule by mouth 2 (two) times daily.  Disp:  Rfl:    insulin detemir (LEVEMIR FLEXTOUCH) 100 UN Readings from Last 3 Encounters:  10/05/17 : 154 lb  09/18/17 : 156 lb  08/28/17 : 156 lb    General: Alert and oriented in no apparent distress.   HEENT: No scleral icterus, MMM  Neck: Supple, no ROMANA or thyromegaly  Cardiac: Regular rate and rhythm, S1, S2

## 2017-11-08 ENCOUNTER — TELEPHONE (OUTPATIENT)
Dept: NEPHROLOGY | Facility: CLINIC | Age: 72
End: 2017-11-08

## 2017-11-12 NOTE — TELEPHONE ENCOUNTER
Left VM x 2 for pt; pt left message at office stating that BP meds \"caused a problem\" and is looking into \"natural solutions\"; asked pt to call back with questions, etc- thx nikig

## 2017-11-20 ENCOUNTER — HOSPITAL ENCOUNTER (EMERGENCY)
Facility: HOSPITAL | Age: 72
Discharge: HOME | End: 2017-11-20
Attending: EMERGENCY MEDICINE
Payer: MEDICARE

## 2017-11-20 ENCOUNTER — APPOINTMENT (OUTPATIENT)
Dept: GENERAL RADIOLOGY | Facility: HOSPITAL | Age: 72
End: 2017-11-20
Payer: MEDICARE

## 2017-11-20 ENCOUNTER — TELEPHONE (OUTPATIENT)
Dept: FAMILY MEDICINE CLINIC | Facility: CLINIC | Age: 72
End: 2017-11-20

## 2017-11-20 ENCOUNTER — APPOINTMENT (OUTPATIENT)
Dept: ULTRASOUND IMAGING | Facility: HOSPITAL | Age: 72
End: 2017-11-20
Attending: PHYSICIAN ASSISTANT
Payer: MEDICARE

## 2017-11-20 VITALS
SYSTOLIC BLOOD PRESSURE: 186 MMHG | DIASTOLIC BLOOD PRESSURE: 71 MMHG | BODY MASS INDEX: 27.6 KG/M2 | HEIGHT: 62 IN | WEIGHT: 150 LBS | RESPIRATION RATE: 16 BRPM | HEART RATE: 63 BPM | OXYGEN SATURATION: 98 % | TEMPERATURE: 98 F

## 2017-11-20 DIAGNOSIS — S80.12XA TRAUMATIC HEMATOMA OF LEFT LOWER LEG, INITIAL ENCOUNTER: ICD-10-CM

## 2017-11-20 DIAGNOSIS — L03.116 CELLULITIS OF LEFT LOWER EXTREMITY: Primary | ICD-10-CM

## 2017-11-20 PROCEDURE — 99284 EMERGENCY DEPT VISIT MOD MDM: CPT

## 2017-11-20 PROCEDURE — 80048 BASIC METABOLIC PNL TOTAL CA: CPT | Performed by: PHYSICIAN ASSISTANT

## 2017-11-20 PROCEDURE — 93971 EXTREMITY STUDY: CPT | Performed by: PHYSICIAN ASSISTANT

## 2017-11-20 PROCEDURE — 36415 COLL VENOUS BLD VENIPUNCTURE: CPT

## 2017-11-20 PROCEDURE — 96374 THER/PROPH/DIAG INJ IV PUSH: CPT

## 2017-11-20 PROCEDURE — 85025 COMPLETE CBC W/AUTO DIFF WBC: CPT | Performed by: PHYSICIAN ASSISTANT

## 2017-11-20 PROCEDURE — 73590 X-RAY EXAM OF LOWER LEG: CPT

## 2017-11-20 PROCEDURE — 87040 BLOOD CULTURE FOR BACTERIA: CPT | Performed by: PHYSICIAN ASSISTANT

## 2017-11-20 RX ORDER — CEPHALEXIN 500 MG/1
500 CAPSULE ORAL 4 TIMES DAILY
Qty: 40 CAPSULE | Refills: 0 | Status: SHIPPED | OUTPATIENT
Start: 2017-11-20 | End: 2017-11-30

## 2017-11-20 RX ORDER — HYDROMORPHONE HYDROCHLORIDE 1 MG/ML
0.5 INJECTION, SOLUTION INTRAMUSCULAR; INTRAVENOUS; SUBCUTANEOUS ONCE
Status: COMPLETED | OUTPATIENT
Start: 2017-11-20 | End: 2017-11-20

## 2017-11-20 RX ORDER — HYDROMORPHONE HYDROCHLORIDE 1 MG/ML
INJECTION, SOLUTION INTRAMUSCULAR; INTRAVENOUS; SUBCUTANEOUS
Status: DISCONTINUED
Start: 2017-11-20 | End: 2017-11-20

## 2017-11-20 NOTE — TELEPHONE ENCOUNTER
Patient called and scheduled an appointment with Dr Jamilah Poe for tomorrow, 11-21-17. She is coming in to have her shin checked out. Took a falll getting into the bathtub last week. Her shin is sore and now redness in the area.     She is worried now wit

## 2017-11-20 NOTE — ED INITIAL ASSESSMENT (HPI)
Pt to ED post fall one week ago. Bruising and redness noted to left lower extremity. Pt sts ambulation is becoming more difficult.

## 2017-11-20 NOTE — ED PROVIDER NOTES
69-year-old female who was seen by me as well as by the physician assistant with complaints of pain swelling and redness to her left lower extremity.   She did sustain a hematoma in the anterior portion of her leg earlier in the week and suddenly had increa

## 2017-11-20 NOTE — ED PROVIDER NOTES
Patient Seen in: [facility] Emergency Department    History   Patient presents with:  Lower Extremity Injury (musculoskeletal)    Stated Complaint: knee infection    HPI  CHIEF COMPLAINT: Left lower leg swelling, erythema, bruising    HISTORY OF PRE • Cataract     cataract sx,    • Colitis     50 years ago   • Coronary atherosclerosis    • Deep vein thrombosis (HCC)     right leg   • DIABETES    • Elevated cholesterol    • Fibromyalgia    • Heart attack    • Heart disease    • High blood pressure    • Other systems are as noted in HPI. Constitutional and vital signs reviewed. All other systems reviewed and negative except as noted above.     Physical Exam   ED Triage Vitals [11/20/17 5785]  BP: (!) 192/72  Pulse: 62  Resp: 18  Temp: 98.4 °F (36.9 ° Eosinophil Absolute 0.48 (*)     Basophil Absolute 0.11 (*)     All other components within normal limits   CBC WITH DIFFERENTIAL WITH PLATELET    Narrative: The following orders were created for panel order CBC WITH DIFFERENTIAL WITH PLATELET.   Proce Patient x-ray results, tib-fib and ultrasound, were both unremarkable negative for DVT. Patient symptoms are consistent with a left lower leg hematoma with a possible early left lower leg anterior cellulitis.   Patient was placed on Keflex 500 mg 4 times d

## 2017-11-21 ENCOUNTER — OFFICE VISIT (OUTPATIENT)
Dept: FAMILY MEDICINE CLINIC | Facility: CLINIC | Age: 72
End: 2017-11-21

## 2017-11-21 ENCOUNTER — TELEPHONE (OUTPATIENT)
Dept: FAMILY MEDICINE CLINIC | Facility: CLINIC | Age: 72
End: 2017-11-21

## 2017-11-21 VITALS
BODY MASS INDEX: 27.59 KG/M2 | RESPIRATION RATE: 18 BRPM | SYSTOLIC BLOOD PRESSURE: 138 MMHG | HEIGHT: 62 IN | TEMPERATURE: 98 F | OXYGEN SATURATION: 96 % | WEIGHT: 149.94 LBS | HEART RATE: 71 BPM | DIASTOLIC BLOOD PRESSURE: 60 MMHG

## 2017-11-21 DIAGNOSIS — Z79.4 TYPE 2 DIABETES MELLITUS WITH DIABETIC NEUROPATHY, WITH LONG-TERM CURRENT USE OF INSULIN (HCC): ICD-10-CM

## 2017-11-21 DIAGNOSIS — Z79.4 DIABETES MELLITUS DUE TO UNDERLYING CONDITION WITH DIABETIC PERIPHERAL ANGIOPATHY WITHOUT GANGRENE, WITH LONG-TERM CURRENT USE OF INSULIN (HCC): ICD-10-CM

## 2017-11-21 DIAGNOSIS — E11.40 TYPE 2 DIABETES MELLITUS WITH DIABETIC NEUROPATHY, WITH LONG-TERM CURRENT USE OF INSULIN (HCC): ICD-10-CM

## 2017-11-21 DIAGNOSIS — L03.116 LEFT LEG CELLULITIS: Primary | ICD-10-CM

## 2017-11-21 DIAGNOSIS — E08.51 DIABETES MELLITUS DUE TO UNDERLYING CONDITION WITH DIABETIC PERIPHERAL ANGIOPATHY WITHOUT GANGRENE, WITH LONG-TERM CURRENT USE OF INSULIN (HCC): ICD-10-CM

## 2017-11-21 PROCEDURE — 99214 OFFICE O/P EST MOD 30 MIN: CPT | Performed by: FAMILY MEDICINE

## 2017-11-21 NOTE — PROGRESS NOTES
HPI:   Ruby Zavala is a 67year old female who presents for recheck of her diabetes and cellulitis     Pt has had some concerns of left leg cellulitis ; pt has been on keflex  Pt c/o pain; pt hit her left shin and then the redness developed 2 days ago   P Date Value   06/28/2013 26   01/26/2010 24   08/22/2008 40   ----------  ALT (U/L)   Date Value   05/05/2014 26   ----------  Alt (U/L)   Date Value   08/18/2017 24   05/25/2017 30   02/01/2017 48   ----------     Malb/Cre Calc   Date Value Ref Range Sta Coronary atherosclerosis    • Deep vein thrombosis (HCC)     right leg   • DIABETES    • Elevated cholesterol    • Fibromyalgia    • Heart attack    • Heart disease    • High blood pressure    • History of blood transfusion    • HTN (hypertension) 7/11/201 unusual skin lesions or rashes  PULMONARY: denies cough or shortness of breath  CV: denies chest pain or palpitations  GI: denies abdominal pain, heartburn, chronic diarrhea or constipation  NEURO: denies headaches or dizziness  PSYCH: stable depression or

## 2017-11-27 ENCOUNTER — OFFICE VISIT (OUTPATIENT)
Dept: FAMILY MEDICINE CLINIC | Facility: CLINIC | Age: 72
End: 2017-11-27

## 2017-11-27 VITALS
SYSTOLIC BLOOD PRESSURE: 138 MMHG | DIASTOLIC BLOOD PRESSURE: 60 MMHG | WEIGHT: 153 LBS | RESPIRATION RATE: 20 BRPM | TEMPERATURE: 98 F | HEART RATE: 76 BPM | BODY MASS INDEX: 28.16 KG/M2 | OXYGEN SATURATION: 96 % | HEIGHT: 62 IN

## 2017-11-27 DIAGNOSIS — Z79.4 DIABETES MELLITUS DUE TO UNDERLYING CONDITION WITH DIABETIC PERIPHERAL ANGIOPATHY WITHOUT GANGRENE, WITH LONG-TERM CURRENT USE OF INSULIN (HCC): ICD-10-CM

## 2017-11-27 DIAGNOSIS — L03.116 LEFT LEG CELLULITIS: Primary | ICD-10-CM

## 2017-11-27 DIAGNOSIS — E08.51 DIABETES MELLITUS DUE TO UNDERLYING CONDITION WITH DIABETIC PERIPHERAL ANGIOPATHY WITHOUT GANGRENE, WITH LONG-TERM CURRENT USE OF INSULIN (HCC): ICD-10-CM

## 2017-11-27 DIAGNOSIS — L97.921 SKIN ULCER OF LEFT LOWER LEG, LIMITED TO BREAKDOWN OF SKIN (HCC): ICD-10-CM

## 2017-11-27 DIAGNOSIS — J02.9 SORE THROAT: ICD-10-CM

## 2017-11-27 PROCEDURE — 87081 CULTURE SCREEN ONLY: CPT | Performed by: FAMILY MEDICINE

## 2017-11-27 PROCEDURE — 87880 STREP A ASSAY W/OPTIC: CPT | Performed by: FAMILY MEDICINE

## 2017-11-27 PROCEDURE — 99214 OFFICE O/P EST MOD 30 MIN: CPT | Performed by: FAMILY MEDICINE

## 2017-11-27 RX ORDER — CEFDINIR 300 MG/1
300 CAPSULE ORAL 2 TIMES DAILY
Qty: 20 CAPSULE | Refills: 0 | Status: SHIPPED | OUTPATIENT
Start: 2017-11-27 | End: 2017-11-29

## 2017-11-27 NOTE — PROGRESS NOTES
HPI:   Paige Sahu is a 67year old female who presents for recheck of her diabetes and cellulitis   New cough    Pt here for left leg cellulitis; pt feels a bit better but she has an ulcer that started over the weekend 3mm   No fever   Pt refused to go t 02/01/2017 34   05/05/2014 18   ----------  ALT (SGPT) (IU/L)   Date Value   06/28/2013 26   01/26/2010 24   08/22/2008 40   ----------  ALT (U/L)   Date Value   05/05/2014 26   ----------  Alt (U/L)   Date Value   08/18/2017 24   05/25/2017 30   02/01/2 • Cataract     cataract sx,    • Colitis     50 years ago   • Coronary atherosclerosis    • Deep vein thrombosis (HCC)     right leg   • DIABETES    • Elevated cholesterol    • Fibromyalgia    • Heart attack    • Heart disease    • High blood pressure OF SYSTEMS:   GENERAL: feels well otherwise  SKIN: denies any unusual skin lesions or rashes  PULMONARY: denies cough or shortness of breath  CV: denies chest pain or palpitations  GI: denies abdominal pain, heartburn, chronic diarrhea or constipation  TAMEKA cefdinir 300 MG Oral Cap; Take 1 capsule (300 mg total) by mouth 2 (two) times daily. Dispense: 20 capsule; Refill: 0  - STREP A ASSAY W/OPTIC  - GRP A STREP CULT, THROAT; Future  - GRP A STREP CULT, THROAT    4.  Diabetes mellitus due to underlying condit

## 2017-11-29 ENCOUNTER — OFFICE VISIT (OUTPATIENT)
Dept: FAMILY MEDICINE CLINIC | Facility: CLINIC | Age: 72
End: 2017-11-29

## 2017-11-29 VITALS
TEMPERATURE: 99 F | OXYGEN SATURATION: 97 % | RESPIRATION RATE: 22 BRPM | HEIGHT: 62 IN | HEART RATE: 75 BPM | DIASTOLIC BLOOD PRESSURE: 80 MMHG | SYSTOLIC BLOOD PRESSURE: 136 MMHG | WEIGHT: 153 LBS | BODY MASS INDEX: 28.16 KG/M2

## 2017-11-29 DIAGNOSIS — J02.9 SORE THROAT: ICD-10-CM

## 2017-11-29 DIAGNOSIS — L97.921 SKIN ULCER OF LEFT LOWER LEG, LIMITED TO BREAKDOWN OF SKIN (HCC): ICD-10-CM

## 2017-11-29 DIAGNOSIS — L03.116 LEFT LEG CELLULITIS: ICD-10-CM

## 2017-11-29 PROCEDURE — 99214 OFFICE O/P EST MOD 30 MIN: CPT | Performed by: FAMILY MEDICINE

## 2017-11-29 RX ORDER — HYDROMORPHONE HYDROCHLORIDE 2 MG/1
2 TABLET ORAL EVERY 4 HOURS PRN
Qty: 20 TABLET | Refills: 0 | Status: SHIPPED | OUTPATIENT
Start: 2017-11-29 | End: 2018-02-14

## 2017-11-29 RX ORDER — CEFDINIR 300 MG/1
300 CAPSULE ORAL 2 TIMES DAILY
Qty: 20 CAPSULE | Refills: 0 | Status: SHIPPED | OUTPATIENT
Start: 2017-11-29 | End: 2017-12-09

## 2017-11-29 NOTE — PROGRESS NOTES
HPI:   Nicko Stein is a 67year old female who presents for recheck of her diabetes and cellulitis, cough and pain concerns. Pain scale is 2/10 ; no intention to take dilaudid for this but pt did take it for her sore throat.    Pt took it to sleep   Pt (SGOT) (IU/L)   Date Value   06/28/2013 22   01/26/2010 22   08/22/2008 31   ----------  AST (U/L)   Date Value   08/18/2017 34   05/25/2017 25   02/01/2017 34   05/05/2014 18   ----------  ALT (SGPT) (IU/L)   Date Value   06/28/2013 26   01/26/2010 24   0 Probiotic Product (PROBIOTIC DAILY OR) Take 2 capsules by mouth daily.  Disp:  Rfl:       Past Medical History:   Diagnosis Date   • Arrhythmia    • Atherosclerosis of coronary artery    • BACK PAIN    • Back problem    • CAD (coronary artery disease) 7/1 Packs/day: 0.00      Years: 0.00         Quit date: 5/23/1972  Smokeless tobacco: Never Used                      Alcohol use: No              Exercise: none. Due to issues with right leg Diet: watches sugar closely- .       R total) by mouth every 4 (four) hours as needed for Pain. Dispense: 20 tablet; Refill: 0  - cefdinir 300 MG Oral Cap; Take 1 capsule (300 mg total) by mouth 2 (two) times daily. Dispense: 20 capsule; Refill: 0    2.  Skin ulcer of left lower leg, limited t

## 2017-11-30 ENCOUNTER — TELEPHONE (OUTPATIENT)
Dept: FAMILY MEDICINE CLINIC | Facility: CLINIC | Age: 72
End: 2017-11-30

## 2017-11-30 NOTE — TELEPHONE ENCOUNTER
See attached phone message. Per lab: Throat culture 11-27-17 was received 11-28-17, preliminary report 11-29-17: no Beta growth;  Per Lab: Final now: Negative.

## 2017-11-30 NOTE — TELEPHONE ENCOUNTER
Patient called and wanted to know if results were in from throat culture. She is in a lot of pain and having a hard time swallowing. She stated she is on an antibiotic for cellulitis. Please advise.

## 2017-12-04 ENCOUNTER — APPOINTMENT (OUTPATIENT)
Dept: LAB | Age: 72
End: 2017-12-04
Attending: FAMILY MEDICINE
Payer: MEDICARE

## 2017-12-04 ENCOUNTER — TELEPHONE (OUTPATIENT)
Dept: NEPHROLOGY | Facility: CLINIC | Age: 72
End: 2017-12-04

## 2017-12-04 ENCOUNTER — OFFICE VISIT (OUTPATIENT)
Dept: FAMILY MEDICINE CLINIC | Facility: CLINIC | Age: 72
End: 2017-12-04

## 2017-12-04 VITALS
HEIGHT: 62 IN | RESPIRATION RATE: 18 BRPM | WEIGHT: 150 LBS | OXYGEN SATURATION: 98 % | SYSTOLIC BLOOD PRESSURE: 180 MMHG | HEART RATE: 86 BPM | TEMPERATURE: 98 F | DIASTOLIC BLOOD PRESSURE: 60 MMHG | BODY MASS INDEX: 27.6 KG/M2

## 2017-12-04 DIAGNOSIS — I73.9 PERIPHERAL VASCULAR DISEASE OF LOWER EXTREMITY (HCC): ICD-10-CM

## 2017-12-04 DIAGNOSIS — E08.51 DIABETES MELLITUS DUE TO UNDERLYING CONDITION WITH DIABETIC PERIPHERAL ANGIOPATHY WITHOUT GANGRENE, WITH LONG-TERM CURRENT USE OF INSULIN (HCC): ICD-10-CM

## 2017-12-04 DIAGNOSIS — I10 ESSENTIAL HYPERTENSION: Primary | ICD-10-CM

## 2017-12-04 DIAGNOSIS — Z79.4 DIABETES MELLITUS DUE TO UNDERLYING CONDITION WITH DIABETIC PERIPHERAL ANGIOPATHY WITHOUT GANGRENE, WITH LONG-TERM CURRENT USE OF INSULIN (HCC): ICD-10-CM

## 2017-12-04 DIAGNOSIS — I25.810 CORONARY ARTERY DISEASE INVOLVING CORONARY BYPASS GRAFT OF NATIVE HEART WITHOUT ANGINA PECTORIS: ICD-10-CM

## 2017-12-04 DIAGNOSIS — E11.3292 MILD NONPROLIFERATIVE DIABETIC RETINOPATHY OF LEFT EYE WITHOUT MACULAR EDEMA ASSOCIATED WITH TYPE 2 DIABETES MELLITUS (HCC): ICD-10-CM

## 2017-12-04 DIAGNOSIS — R09.82 POSTNASAL DRIP: ICD-10-CM

## 2017-12-04 DIAGNOSIS — R80.9 PROTEINURIA, UNSPECIFIED TYPE: ICD-10-CM

## 2017-12-04 PROCEDURE — 36415 COLL VENOUS BLD VENIPUNCTURE: CPT

## 2017-12-04 PROCEDURE — 99214 OFFICE O/P EST MOD 30 MIN: CPT | Performed by: FAMILY MEDICINE

## 2017-12-04 PROCEDURE — 83036 HEMOGLOBIN GLYCOSYLATED A1C: CPT

## 2017-12-04 PROCEDURE — 82043 UR ALBUMIN QUANTITATIVE: CPT

## 2017-12-04 PROCEDURE — 82570 ASSAY OF URINE CREATININE: CPT

## 2017-12-04 RX ORDER — AMLODIPINE BESYLATE 2.5 MG/1
2.5 TABLET ORAL DAILY
Qty: 90 TABLET | Refills: 0 | Status: SHIPPED | OUTPATIENT
Start: 2017-12-04 | End: 2017-12-04

## 2017-12-04 RX ORDER — FLUTICASONE PROPIONATE 50 MCG
2 SPRAY, SUSPENSION (ML) NASAL NIGHTLY
Qty: 1 BOTTLE | Refills: 0 | Status: SHIPPED | OUTPATIENT
Start: 2017-12-04 | End: 2017-12-04

## 2017-12-04 RX ORDER — AMLODIPINE BESYLATE 2.5 MG/1
2.5 TABLET ORAL DAILY
Qty: 90 TABLET | Refills: 0 | Status: SHIPPED | OUTPATIENT
Start: 2017-12-04 | End: 2018-02-08

## 2017-12-04 RX ORDER — FLUTICASONE PROPIONATE 50 MCG
2 SPRAY, SUSPENSION (ML) NASAL NIGHTLY
Qty: 1 BOTTLE | Refills: 0 | Status: ON HOLD | OUTPATIENT
Start: 2017-12-04 | End: 2018-02-03

## 2017-12-04 NOTE — PROGRESS NOTES
HPI:   Claudene Hails is a 67year old female who presents for recheck of her diabetes and cellulitis and HTN    Pt here for left leg cellulitis and an ulcer   No fever   Pt refused to go to wound clinic  Pt reports she did start the new abx and doing better (U/L)   Date Value   08/18/2017 34   05/25/2017 25   02/01/2017 34   05/05/2014 18   ----------  ALT (SGPT) (IU/L)   Date Value   06/28/2013 26   01/26/2010 24   08/22/2008 40   ----------  ALT (U/L)   Date Value   05/05/2014 26   ----------  Alt (U/L)   D Rfl:       Past Medical History:   Diagnosis Date   • Arrhythmia    • Atherosclerosis of coronary artery    • BACK PAIN    • Back problem    • CAD (coronary artery disease) 7/11/2013   • Cataract     cataract sx,    • Colitis     50 years ago   • Coronary date: 5/23/1972  Smokeless tobacco: Never Used                      Alcohol use: No              Exercise: none. Due to issues with right leg Diet: watches sugar closely- .       REVIEW OF SYSTEMS:   GENERAL: feels well otherwise  SKIN: denies any unusual s Peripheral vascular disease of lower extremity (HCC)  Monitor     3. Coronary artery disease involving coronary bypass graft of native heart without angina pectoris  cpm     4.  Mild nonproliferative diabetic retinopathy of left eye without macular edema as

## 2017-12-05 DIAGNOSIS — E08.51 DIABETES MELLITUS DUE TO UNDERLYING CONDITION WITH DIABETIC PERIPHERAL ANGIOPATHY WITHOUT GANGRENE, WITHOUT LONG-TERM CURRENT USE OF INSULIN (HCC): Primary | ICD-10-CM

## 2017-12-05 NOTE — TELEPHONE ENCOUNTER
D/W pt- pt agrees to try low dose amlodipine . 2.5 mg qd as per Dr. Jm Doss (had bad diarrhea the first day on amlodipine 5 mg qd when I prescribed it); agrees that BP needs be controlled; urine malb / creat ratio stable approx 1.3 g- no need for renal biop

## 2017-12-26 ENCOUNTER — NURSE ONLY (OUTPATIENT)
Dept: FAMILY MEDICINE CLINIC | Facility: CLINIC | Age: 72
End: 2017-12-26

## 2017-12-26 VITALS — SYSTOLIC BLOOD PRESSURE: 136 MMHG | DIASTOLIC BLOOD PRESSURE: 62 MMHG

## 2017-12-26 NOTE — PROGRESS NOTES
Patient has not started taking BP medication yet. She was sick and didn't want to start taking till after sore throat has passed. Going schedule another nurse visit for one month for a BP check since she hasn't started medication yet.

## 2018-01-10 ENCOUNTER — TELEPHONE (OUTPATIENT)
Dept: FAMILY MEDICINE CLINIC | Facility: CLINIC | Age: 73
End: 2018-01-10

## 2018-01-10 NOTE — TELEPHONE ENCOUNTER
Spoke with pt  on amlodipine increased to 7.5 mg and on 2nd day pt developed diarrhea . This is the same thing that happened when Dr Dino Mckeon prescribed. Please advise pt was on 2,5 mg x 1 week then increased to 5  Mg for 1 week. Pt was fine on lower doses then i

## 2018-01-10 NOTE — TELEPHONE ENCOUNTER
Patient was started back up onAmLODIPine Besylate 2.5 MG Oral Tab. She has slowly been working her way up and was supposed to come in for a bp check. Today she got the same side effect she had before.   She stated she was taking 750mg (please check that d

## 2018-02-03 ENCOUNTER — APPOINTMENT (OUTPATIENT)
Dept: CV DIAGNOSTICS | Facility: HOSPITAL | Age: 73
DRG: 286 | End: 2018-02-03
Attending: INTERNAL MEDICINE
Payer: MEDICARE

## 2018-02-03 ENCOUNTER — HOSPITAL ENCOUNTER (INPATIENT)
Facility: HOSPITAL | Age: 73
LOS: 5 days | Discharge: HOME | DRG: 286 | End: 2018-02-08
Attending: EMERGENCY MEDICINE | Admitting: INTERNAL MEDICINE
Payer: MEDICARE

## 2018-02-03 ENCOUNTER — APPOINTMENT (OUTPATIENT)
Dept: GENERAL RADIOLOGY | Facility: HOSPITAL | Age: 73
DRG: 286 | End: 2018-02-03
Attending: EMERGENCY MEDICINE
Payer: MEDICARE

## 2018-02-03 DIAGNOSIS — R74.8 ELEVATED LIVER ENZYMES: ICD-10-CM

## 2018-02-03 DIAGNOSIS — J81.0 ACUTE PULMONARY EDEMA (HCC): ICD-10-CM

## 2018-02-03 DIAGNOSIS — R07.9 ACUTE CHEST PAIN: Primary | ICD-10-CM

## 2018-02-03 DIAGNOSIS — R77.8 ELEVATED TROPONIN: ICD-10-CM

## 2018-02-03 DIAGNOSIS — E87.1 HYPONATREMIA: ICD-10-CM

## 2018-02-03 PROBLEM — Z79.4 TYPE 2 DIABETES MELLITUS WITH COMPLICATION, WITH LONG-TERM CURRENT USE OF INSULIN (HCC): Status: ACTIVE | Noted: 2017-04-20

## 2018-02-03 PROBLEM — I10 BENIGN ESSENTIAL HTN: Status: ACTIVE | Noted: 2017-02-06

## 2018-02-03 PROBLEM — E11.8 TYPE 2 DIABETES MELLITUS WITH COMPLICATION, WITH LONG-TERM CURRENT USE OF INSULIN (HCC): Status: ACTIVE | Noted: 2017-04-20

## 2018-02-03 PROBLEM — R79.89 ELEVATED TROPONIN: Status: ACTIVE | Noted: 2018-02-03

## 2018-02-03 LAB
ALBUMIN SERPL-MCNC: 2.3 G/DL (ref 3.5–4.8)
ALBUMIN SERPL-MCNC: 2.7 G/DL (ref 3.5–4.8)
ALP LIVER SERPL-CCNC: 409 U/L (ref 55–142)
ALP LIVER SERPL-CCNC: 483 U/L (ref 55–142)
ALT SERPL-CCNC: 68 U/L (ref 14–54)
ALT SERPL-CCNC: 76 U/L (ref 14–54)
AST SERPL-CCNC: 43 U/L (ref 15–41)
AST SERPL-CCNC: 53 U/L (ref 15–41)
ATRIAL RATE: 76 BPM
BASOPHILS # BLD AUTO: 0.05 X10(3) UL (ref 0–0.1)
BASOPHILS # BLD AUTO: 0.06 X10(3) UL (ref 0–0.1)
BASOPHILS NFR BLD AUTO: 0.3 %
BASOPHILS NFR BLD AUTO: 0.4 %
BILIRUB SERPL-MCNC: 2 MG/DL (ref 0.1–2)
BILIRUB SERPL-MCNC: 2.6 MG/DL (ref 0.1–2)
BUN BLD-MCNC: 19 MG/DL (ref 8–20)
BUN BLD-MCNC: 21 MG/DL (ref 8–20)
BUN BLD-MCNC: 21 MG/DL (ref 8–20)
CALCIUM BLD-MCNC: 7.7 MG/DL (ref 8.3–10.3)
CALCIUM BLD-MCNC: 8.3 MG/DL (ref 8.3–10.3)
CALCIUM BLD-MCNC: 8.3 MG/DL (ref 8.3–10.3)
CHLORIDE: 91 MMOL/L (ref 101–111)
CHLORIDE: 95 MMOL/L (ref 101–111)
CHLORIDE: 95 MMOL/L (ref 101–111)
CHOLEST SMN-MCNC: 217 MG/DL (ref ?–200)
CO2: 20 MMOL/L (ref 22–32)
CO2: 21 MMOL/L (ref 22–32)
CO2: 21 MMOL/L (ref 22–32)
CREAT BLD-MCNC: 0.68 MG/DL (ref 0.55–1.02)
CREAT BLD-MCNC: 0.7 MG/DL (ref 0.55–1.02)
CREAT BLD-MCNC: 0.72 MG/DL (ref 0.55–1.02)
EOSINOPHIL # BLD AUTO: 0.07 X10(3) UL (ref 0–0.3)
EOSINOPHIL # BLD AUTO: 0.1 X10(3) UL (ref 0–0.3)
EOSINOPHIL NFR BLD AUTO: 0.4 %
EOSINOPHIL NFR BLD AUTO: 0.6 %
ERYTHROCYTE [DISTWIDTH] IN BLOOD BY AUTOMATED COUNT: 14.9 % (ref 11.5–16)
ERYTHROCYTE [DISTWIDTH] IN BLOOD BY AUTOMATED COUNT: 15.2 % (ref 11.5–16)
EST. AVERAGE GLUCOSE BLD GHB EST-MCNC: 140 MG/DL (ref 68–126)
GLUCOSE BLD-MCNC: 102 MG/DL (ref 65–99)
GLUCOSE BLD-MCNC: 102 MG/DL (ref 65–99)
GLUCOSE BLD-MCNC: 131 MG/DL (ref 65–99)
GLUCOSE BLD-MCNC: 156 MG/DL (ref 70–99)
GLUCOSE BLD-MCNC: 157 MG/DL (ref 70–99)
GLUCOSE BLD-MCNC: 163 MG/DL (ref 65–99)
GLUCOSE BLD-MCNC: 172 MG/DL (ref 70–99)
HBA1C MFR BLD HPLC: 6.5 % (ref ?–5.7)
HCT VFR BLD AUTO: 37.7 % (ref 34–50)
HCT VFR BLD AUTO: 39.6 % (ref 34–50)
HDLC SERPL-MCNC: 49 MG/DL (ref 45–?)
HDLC SERPL: 4.43 {RATIO} (ref ?–4.44)
HGB BLD-MCNC: 12.6 G/DL (ref 12–16)
HGB BLD-MCNC: 13.5 G/DL (ref 12–16)
IMMATURE GRANULOCYTE COUNT: 0.11 X10(3) UL (ref 0–1)
IMMATURE GRANULOCYTE COUNT: 0.15 X10(3) UL (ref 0–1)
IMMATURE GRANULOCYTE RATIO %: 0.7 %
IMMATURE GRANULOCYTE RATIO %: 0.9 %
LDLC SERPL CALC-MCNC: 160 MG/DL (ref ?–130)
LYMPHOCYTES # BLD AUTO: 0.69 X10(3) UL (ref 0.9–4)
LYMPHOCYTES # BLD AUTO: 0.86 X10(3) UL (ref 0.9–4)
LYMPHOCYTES NFR BLD AUTO: 4.1 %
LYMPHOCYTES NFR BLD AUTO: 5.5 %
M PROTEIN MFR SERPL ELPH: 5.9 G/DL (ref 6.1–8.3)
M PROTEIN MFR SERPL ELPH: 6.7 G/DL (ref 6.1–8.3)
MCH RBC QN AUTO: 29.6 PG (ref 27–33.2)
MCH RBC QN AUTO: 30 PG (ref 27–33.2)
MCHC RBC AUTO-ENTMCNC: 33.4 G/DL (ref 31–37)
MCHC RBC AUTO-ENTMCNC: 34.1 G/DL (ref 31–37)
MCV RBC AUTO: 88 FL (ref 81–100)
MCV RBC AUTO: 88.7 FL (ref 81–100)
MONOCYTES # BLD AUTO: 1.38 X10(3) UL (ref 0.1–0.6)
MONOCYTES # BLD AUTO: 1.49 X10(3) UL (ref 0.1–0.6)
MONOCYTES NFR BLD AUTO: 8.2 %
MONOCYTES NFR BLD AUTO: 9.6 %
NEUTROPHIL ABS PRELIM: 12.96 X10 (3) UL (ref 1.3–6.7)
NEUTROPHIL ABS PRELIM: 14.41 X10 (3) UL (ref 1.3–6.7)
NEUTROPHILS # BLD AUTO: 12.96 X10(3) UL (ref 1.3–6.7)
NEUTROPHILS # BLD AUTO: 14.41 X10(3) UL (ref 1.3–6.7)
NEUTROPHILS NFR BLD AUTO: 83.3 %
NEUTROPHILS NFR BLD AUTO: 86 %
NONHDLC SERPL-MCNC: 168 MG/DL (ref ?–130)
P AXIS: 32 DEGREES
P-R INTERVAL: 190 MS
PLATELET # BLD AUTO: 298 10(3)UL (ref 150–450)
PLATELET # BLD AUTO: 312 10(3)UL (ref 150–450)
POTASSIUM SERPL-SCNC: 4.7 MMOL/L (ref 3.6–5.1)
POTASSIUM SERPL-SCNC: 4.8 MMOL/L (ref 3.6–5.1)
POTASSIUM SERPL-SCNC: 5.3 MMOL/L (ref 3.6–5.1)
PROCALCITONIN SERPL-MCNC: 0.51 NG/ML (ref ?–0.11)
Q-T INTERVAL: 398 MS
QRS DURATION: 94 MS
QTC CALCULATION (BEZET): 447 MS
R AXIS: -60 DEGREES
RBC # BLD AUTO: 4.25 X10(6)UL (ref 3.8–5.1)
RBC # BLD AUTO: 4.5 X10(6)UL (ref 3.8–5.1)
RED CELL DISTRIBUTION WIDTH-SD: 48.4 FL (ref 35.1–46.3)
RED CELL DISTRIBUTION WIDTH-SD: 49.3 FL (ref 35.1–46.3)
SODIUM SERPL-SCNC: 124 MMOL/L (ref 136–144)
SODIUM SERPL-SCNC: 126 MMOL/L (ref 136–144)
SODIUM SERPL-SCNC: 126 MMOL/L (ref 136–144)
T AXIS: 44 DEGREES
TRIGL SERPL-MCNC: 42 MG/DL (ref ?–150)
TROPONIN: 0.09 NG/ML (ref ?–0.05)
VENTRICULAR RATE: 76 BPM
VLDLC SERPL CALC-MCNC: 8 MG/DL (ref 5–40)
WBC # BLD AUTO: 15.6 X10(3) UL (ref 4–13)
WBC # BLD AUTO: 16.8 X10(3) UL (ref 4–13)

## 2018-02-03 PROCEDURE — 99223 1ST HOSP IP/OBS HIGH 75: CPT | Performed by: INTERNAL MEDICINE

## 2018-02-03 PROCEDURE — 93306 TTE W/DOPPLER COMPLETE: CPT | Performed by: INTERNAL MEDICINE

## 2018-02-03 PROCEDURE — 5A09357 ASSISTANCE WITH RESPIRATORY VENTILATION, LESS THAN 24 CONSECUTIVE HOURS, CONTINUOUS POSITIVE AIRWAY PRESSURE: ICD-10-PCS | Performed by: HOSPITALIST

## 2018-02-03 PROCEDURE — 71045 X-RAY EXAM CHEST 1 VIEW: CPT | Performed by: EMERGENCY MEDICINE

## 2018-02-03 RX ORDER — FUROSEMIDE 10 MG/ML
20 INJECTION INTRAMUSCULAR; INTRAVENOUS
Status: DISCONTINUED | OUTPATIENT
Start: 2018-02-03 | End: 2018-02-08

## 2018-02-03 RX ORDER — ACETAMINOPHEN 325 MG/1
650 TABLET ORAL EVERY 6 HOURS PRN
Status: DISCONTINUED | OUTPATIENT
Start: 2018-02-03 | End: 2018-02-08

## 2018-02-03 RX ORDER — DEXTROSE MONOHYDRATE 25 G/50ML
50 INJECTION, SOLUTION INTRAVENOUS
Status: DISCONTINUED | OUTPATIENT
Start: 2018-02-03 | End: 2018-02-08

## 2018-02-03 RX ORDER — FUROSEMIDE 10 MG/ML
40 INJECTION INTRAMUSCULAR; INTRAVENOUS ONCE
Status: COMPLETED | OUTPATIENT
Start: 2018-02-03 | End: 2018-02-03

## 2018-02-03 RX ORDER — FLUTICASONE PROPIONATE 50 MCG
2 SPRAY, SUSPENSION (ML) NASAL NIGHTLY
Status: DISCONTINUED | OUTPATIENT
Start: 2018-02-03 | End: 2018-02-06

## 2018-02-03 RX ORDER — LISINOPRIL 10 MG/1
10 TABLET ORAL DAILY
Status: DISCONTINUED | OUTPATIENT
Start: 2018-02-03 | End: 2018-02-04

## 2018-02-03 RX ORDER — ASPIRIN 325 MG
325 TABLET ORAL DAILY
Status: DISCONTINUED | OUTPATIENT
Start: 2018-02-03 | End: 2018-02-08

## 2018-02-03 RX ORDER — HEPARIN SODIUM 5000 [USP'U]/ML
5000 INJECTION, SOLUTION INTRAVENOUS; SUBCUTANEOUS EVERY 8 HOURS SCHEDULED
Status: DISCONTINUED | OUTPATIENT
Start: 2018-02-03 | End: 2018-02-08

## 2018-02-03 RX ORDER — CLOPIDOGREL BISULFATE 75 MG/1
75 TABLET ORAL DAILY
Status: DISCONTINUED | OUTPATIENT
Start: 2018-02-03 | End: 2018-02-07

## 2018-02-03 RX ORDER — ASPIRIN 81 MG/1
324 TABLET, CHEWABLE ORAL ONCE
Status: COMPLETED | OUTPATIENT
Start: 2018-02-03 | End: 2018-02-03

## 2018-02-03 RX ORDER — NITROGLYCERIN 0.4 MG/1
0.4 TABLET SUBLINGUAL EVERY 5 MIN PRN
Status: DISCONTINUED | OUTPATIENT
Start: 2018-02-03 | End: 2018-02-08

## 2018-02-03 RX ORDER — ONDANSETRON 2 MG/ML
4 INJECTION INTRAMUSCULAR; INTRAVENOUS EVERY 6 HOURS PRN
Status: DISCONTINUED | OUTPATIENT
Start: 2018-02-03 | End: 2018-02-08

## 2018-02-03 RX ORDER — AMLODIPINE BESYLATE 5 MG/1
5 TABLET ORAL DAILY
Status: DISCONTINUED | OUTPATIENT
Start: 2018-02-03 | End: 2018-02-08

## 2018-02-03 NOTE — ED INITIAL ASSESSMENT (HPI)
Patient arrives from home with c/o chest pain states fever, chills and cough 2 days ago that has resolved.

## 2018-02-03 NOTE — PROGRESS NOTES
SITTING ON BEDSIDE CHAIR. ALERT AND OX3. O2 3L/NC-WILL WEAN IT OFF. TELE SR WITH HR-60S. DENIES C/O PAIN. NO SWELLING NOTED. VOIDING MELANIE URINE.DR. Gissell Donald MADE ROUNDS WITH ORDERS. FLU PANEL-. PLAN:ECHO TODAY. UP IN CHAIR WITH ALL MEALS.             AM

## 2018-02-03 NOTE — ED PROVIDER NOTES
Patient Seen in: [facility] Emergency Department    History   Patient presents with:  Chest Pain Angina (cardiovascular)    Stated Complaint: chest pain    HPI    Srini Stokes is a 19-year-old female presenting to the emergency department for chest pain.   P CHOLECYSTECTOMY  abdominoplasty '98: OTHER SURGICAL HISTORY  breast reduction '98: OTHER SURGICAL HISTORY  right fem pop bypass 2/10/15: OTHER SURGICAL HISTORY      Comment: Dr. Regan Oakes  No date: REDUCTION LEFT Left  No date: REDUCTION RIGHT Right  6/23/10: Phosphatase 483 (*)     AST 53 (*)     Alt 76 (*)     Bilirubin, Total 2.6 (*)     Albumin 2.7 (*)     Sodium 124 (*)     Chloride 91 (*)     CO2 20.0 (*)     All other components within normal limits   TROPONIN I - Abnormal; Notable for the following: provider specified.       Medications Prescribed:  Current Discharge Medication List        Present on Admission  Date Reviewed: 12/4/2017          ICD-10-CM Noted POA    Acute chest pain R07.9 2/3/2018 Unknown

## 2018-02-03 NOTE — HISTORICAL OFFICE NOTE
Melinda Shipman  : 1945  ACCOUNT:  [id number]  [phone]  PCP: Dr. Ravi Garza     TODAY'S DATE: 2017  DICTATED BY:  Margoth Toney M.D.]    CHIEF COMPLAINT: [Followup of .  CAD, of native vessels.]    HPI:    [On 2017, [facility], a vein patch and hombectomy 2015    FAMILY HISTORY: Significant for premature CAD. Significant for AAA. SOCIAL HISTORY: SMOKING: Former tobacco use. used to smoke but quit >20 years ago. CAFFEINE: 3-6 cups of coffee. ALCOHOL: drinks rarely.  EXERCISE: no r oxygen consumption reduction especially in the heart in the setting of significant coronary disease as she has. The patient is okay with doing this and will get back on the metoprolol.   I have also suggested that she go back on her Avapro with her berto 30MG      one daily                                05/26/15 Vitamin D-3           1000UNIT  AS DIRECTED.                             05/26/15 Vitamin K2            100MCG    2 DAILY                                  01/20/15 Aspirin               32

## 2018-02-03 NOTE — H&P
[facility] HOSPITALIST  History and Physical     [place] Jenny Barger Patient Status:  Emergency    1945 MRN [id number]   Location [facility] Attending Amelia Francis MD   Hosp Day # 0 PCP Tatum Catherine DO     Chief Complaint: CP, CATH PERCUTANEOUS  TRANSLUMINAL CORONARY ANGIO*  '95: CHOLECYSTECTOMY  abdominoplasty '98: OTHER SURGICAL HISTORY  breast reduction '98: OTHER SURGICAL HISTORY  right fem pop bypass 2/10/15: OTHER SURGICAL HISTORY      Comment: Dr. Almita Johnson  No date: Martha Still tablet Rfl: 0   Fluticasone Propionate 50 MCG/ACT Nasal Suspension 2 sprays by Nasal route nightly. Disp: 1 Bottle Rfl: 0   AmLODIPine Besylate 5 MG Oral Tab Take 1 tablet (5 mg total) by mouth daily.  Disp: 30 tablet Rfl: 11   Biotin 5000 MCG Oral Cap Take lesions. Psychiatric: Appropriate mood and affect.       Diagnostic Data:      Labs:  Recent Labs   Lab  02/03/18   0009   WBC  15.6*   HGB  13.5   MCV  88.0   PLT  312.0       Recent Labs   Lab  02/03/18 0009   GLU  156*   BUN  19   CREATSERUM  0.72

## 2018-02-03 NOTE — PROGRESS NOTES
Admitted by Dr Benji Scott early today morning  CC: Chest pain, shortness of breath, abdominal pain, nausea  S:  patient states that she she had chest pain and abdominal pain and shortness of breath and nausea and pedal edema at home.   denies any sick conta will need to get GI consult  Hyponatremia -follow labs  Hyperkalemia-Kayexalate  Uncontrolled isolated systolic hypertension-patient has had renal artery duplex on 9/22/2017 which showed no sonographic evidence for hemodynamically significant stenosis in t

## 2018-02-03 NOTE — CONSULTS
[facility]  Cardiology Consultation    Andra Ar Amezquita. Patient Status:  Observation    1945 MRN [id number]   [facility] 8NE-A Attending Tony Bee MD   Hosp Day # 0 PCP Mirlande Hassan DO     Reason for Consultation:  Gaby [place] LAD,RCA  2014: ANGIOPLASTY (CORONARY)      Comment: Rt common fem arthroplasry, stent  5/12/15: ANGIOPLASTY (CORONARY)      Comment: right popliteal  No date:   No date: CATH DRUG ELUTING STENT  No date: CATH PERCUTANEOUS  TRANSLUMINAL CORONARY Oral, Q15 Min PRN **OR** Glucose-Vitamin C (DEX-4) 4-0.006 g chewable tab 4 tablet, 4 tablet, Oral, Q15 Min PRN **OR** dextrose 50% injection 50 mL, 50 mL, Intravenous, Q15 Min PRN **OR** glucose (DEX4) oral liquid 30 g, 30 g, Oral, Q15 Min PRN **OR** Gluc cyanosis; trace bilateral pedal edema. Neurologic: Alert and oriented, normal affect. Skin: Warm and dry.      Laboratory Data:    Lab Results  Component Value Date   WBC 16.8 02/03/2018   HGB 12.6 02/03/2018   HCT 37.7 02/03/2018   .0 02/03/2018 atelectasis at lung bases versus focal edema.  Dictated by: Kelin Knott MD on 2/03/2018 at 8:29         Impression:  · Acute chest pain  · Dyspnea  · Acute pulmonary edema  · Elevated troponin  · Elevated liver enzymes  · CAD s/p cath and PTCA/PCI wit

## 2018-02-03 NOTE — RESPIRATORY THERAPY NOTE
Pt refusing CPAP use during hospital stay. Pt states that she normally does wear CPAP at home, but has not worn it for the last two nights because it has been bothering her while she is sick. Pt only wants to wear nasal cannula at this time. Pt on 2 L/NC.

## 2018-02-03 NOTE — PLAN OF CARE
NURSING ADMISSION NOTE      Patient admitted via CART. Oriented to room. Safety precautions initiated. Bed in low position. Call light in reach.   ADMITTED A 72 YRS OLD FEMALE FROM ER ALERT AND ORIENTED X4 WITH DIAGNOSIS OF PUL EDEMA, CHEST PAIN AND

## 2018-02-04 ENCOUNTER — APPOINTMENT (OUTPATIENT)
Dept: ULTRASOUND IMAGING | Facility: HOSPITAL | Age: 73
DRG: 286 | End: 2018-02-04
Attending: INTERNAL MEDICINE
Payer: MEDICARE

## 2018-02-04 PROBLEM — I50.31 ACUTE DIASTOLIC CHF (CONGESTIVE HEART FAILURE) (HCC): Status: ACTIVE | Noted: 2018-02-04

## 2018-02-04 PROBLEM — Z99.89 OSA ON CPAP: Status: ACTIVE | Noted: 2018-02-04

## 2018-02-04 PROBLEM — G47.33 OSA ON CPAP: Status: ACTIVE | Noted: 2018-02-04

## 2018-02-04 LAB
ALBUMIN SERPL-MCNC: 2.2 G/DL (ref 3.5–4.8)
ALP LIVER SERPL-CCNC: 387 U/L (ref 55–142)
ALT SERPL-CCNC: 46 U/L (ref 14–54)
AST SERPL-CCNC: 26 U/L (ref 15–41)
ATRIAL RATE: 66 BPM
BILIRUB SERPL-MCNC: 1.2 MG/DL (ref 0.1–2)
BUN BLD-MCNC: 23 MG/DL (ref 8–20)
CALCIUM BLD-MCNC: 7.9 MG/DL (ref 8.3–10.3)
CHLORIDE: 96 MMOL/L (ref 101–111)
CO2: 23 MMOL/L (ref 22–32)
CREAT BLD-MCNC: 0.75 MG/DL (ref 0.55–1.02)
GLUCOSE BLD-MCNC: 80 MG/DL (ref 65–99)
GLUCOSE BLD-MCNC: 86 MG/DL (ref 70–99)
GLUCOSE BLD-MCNC: 90 MG/DL (ref 65–99)
GLUCOSE BLD-MCNC: 90 MG/DL (ref 65–99)
GLUCOSE BLD-MCNC: 91 MG/DL (ref 65–99)
M PROTEIN MFR SERPL ELPH: 5.6 G/DL (ref 6.1–8.3)
P AXIS: 9 DEGREES
P-R INTERVAL: 186 MS
POTASSIUM SERPL-SCNC: 4.3 MMOL/L (ref 3.6–5.1)
Q-T INTERVAL: 448 MS
QRS DURATION: 90 MS
QTC CALCULATION (BEZET): 469 MS
R AXIS: -58 DEGREES
SODIUM SERPL-SCNC: 130 MMOL/L (ref 136–144)
T AXIS: 49 DEGREES
TROPONIN: 0.07 NG/ML (ref ?–0.05)
VENTRICULAR RATE: 66 BPM

## 2018-02-04 PROCEDURE — 76700 US EXAM ABDOM COMPLETE: CPT | Performed by: INTERNAL MEDICINE

## 2018-02-04 PROCEDURE — 99232 SBSQ HOSP IP/OBS MODERATE 35: CPT | Performed by: INTERNAL MEDICINE

## 2018-02-04 RX ORDER — LISINOPRIL 20 MG/1
20 TABLET ORAL DAILY
Status: DISCONTINUED | OUTPATIENT
Start: 2018-02-04 | End: 2018-02-08

## 2018-02-04 NOTE — PLAN OF CARE
CARDIOVASCULAR - ADULT    • Maintains optimal cardiac output and hemodynamic stability Progressing    • Absence of cardiac arrhythmias or at baseline Progressing          Assumed care of pt @ 2300. AOx4. On RA, CPAP @ night. NSR with freq PVCs on tele.  Pt

## 2018-02-04 NOTE — RESPIRATORY THERAPY NOTE
TASHA : EQUIPMENT USE: DAILY SUMMARY                                            SET MODE: AUTO CPAP WITH CFLEX                                            USAGE IN HOURS: 10:15

## 2018-02-04 NOTE — PROGRESS NOTES
[facility]  Progress Note    Nancy Barger Patient Status:  Inpatient    1945 MRN [id number]   [facility] 8NE-A Attending Barbara Kuo MD   [address] Day # 1 PCP Gemma Ireland DO     CC:  Chest pain, shortness of breath, abdominal sri 02/04/2018   CL 96 02/04/2018   CO2 23.0 02/04/2018   GLU 86 02/04/2018   CA 7.9 02/04/2018   ALB 2.2 02/04/2018   ALKPHO 387 02/04/2018   BILT 1.2 02/04/2018   TP 5.6 02/04/2018   AST 26 02/04/2018   ALT 46 02/04/2018   TROP 0.071 02/04/2018   PGLU 90 02/ Q15 Min PRN   Or      Glucose-Vitamin C (DEX-4) 4-0.006 g chewable tab 8 tablet 8 tablet Oral Q15 Min PRN   Heparin Sodium (Porcine) 5000 UNIT/ML injection 5,000 Units 5,000 Units Subcutaneous Q8H [facility]   acetaminophen (TYLENOL) tab 650 mg 650 mg Oral Q6H [place] dependent on: Clinical progress, cardiology clearance  At this point Ms. Cami Osborn  is expected to be discharge to: Home      Questions/concerns and Plan of care were discussed with patient    **Certification      PHYSICIAN Certification of Need for Inpatient

## 2018-02-04 NOTE — PROGRESS NOTES
CARDIOVASCULAR - ADULT     • Maintains optimal cardiac output and hemodynamic stability Progressing     • Absence of cardiac arrhythmias or at baseline Progressing              Assumed care of pt @ 0700. AOx4. On RA, CPAP @ night.  NSR with freq PVCs on tel

## 2018-02-04 NOTE — PROGRESS NOTES
[facility]  Progress Note    Sahil Barger Patient Status:  Inpatient    1945 MRN [id number]   [facility] 8NE-A Attending Lesly Damian MD   [facility] Day # 1 PCP Saman Wells DO       Assessment and Plan:  Patient Active Problem List tomorrow  Echo with normal LVSF and without regional wall motion abnormalities. With statin intolerance and evidence for CAD, would discuss PCSK9 inhibitor with her again. Currently she is hemodynamically compensated and asymptomatic.        Subjective: K 4.3 02/04/2018   CL 96 02/04/2018   CO2 23.0 02/04/2018   GLU 86 02/04/2018   CA 7.9 02/04/2018   ALB 2.2 02/04/2018   ALKPHO 387 02/04/2018   BILT 1.2 02/04/2018   TP 5.6 02/04/2018   AST 26 02/04/2018   ALT 46 02/04/2018   TROP 0.071 02/04/2018   PGL

## 2018-02-05 ENCOUNTER — APPOINTMENT (OUTPATIENT)
Dept: CV DIAGNOSTICS | Facility: HOSPITAL | Age: 73
DRG: 286 | End: 2018-02-05
Attending: INTERNAL MEDICINE
Payer: MEDICARE

## 2018-02-05 LAB
BASOPHILS # BLD AUTO: 0.12 X10(3) UL (ref 0–0.1)
BASOPHILS NFR BLD AUTO: 0.9 %
BETA NATRIURETIC PEPTIDE: 319 PG/ML (ref 2–99)
BUN BLD-MCNC: 29 MG/DL (ref 8–20)
CALCIUM BLD-MCNC: 9 MG/DL (ref 8.3–10.3)
CHLORIDE: 97 MMOL/L (ref 101–111)
CK: 32 IU/L (ref 26–192)
CO2: 25 MMOL/L (ref 22–32)
CREAT BLD-MCNC: 0.85 MG/DL (ref 0.55–1.02)
EOSINOPHIL # BLD AUTO: 0.47 X10(3) UL (ref 0–0.3)
EOSINOPHIL NFR BLD AUTO: 3.5 %
ERYTHROCYTE [DISTWIDTH] IN BLOOD BY AUTOMATED COUNT: 15 % (ref 11.5–16)
GLUCOSE BLD-MCNC: 100 MG/DL (ref 65–99)
GLUCOSE BLD-MCNC: 75 MG/DL (ref 70–99)
GLUCOSE BLD-MCNC: 84 MG/DL (ref 65–99)
GLUCOSE BLD-MCNC: 84 MG/DL (ref 65–99)
GLUCOSE BLD-MCNC: 94 MG/DL (ref 65–99)
HAV IGM SER QL: 2.2 MG/DL (ref 1.7–3)
HCT VFR BLD AUTO: 43.2 % (ref 34–50)
HGB BLD-MCNC: 14.5 G/DL (ref 12–16)
IMMATURE GRANULOCYTE COUNT: 0.09 X10(3) UL (ref 0–1)
IMMATURE GRANULOCYTE RATIO %: 0.7 %
LYMPHOCYTES # BLD AUTO: 2.17 X10(3) UL (ref 0.9–4)
LYMPHOCYTES NFR BLD AUTO: 16.4 %
MCH RBC QN AUTO: 29.4 PG (ref 27–33.2)
MCHC RBC AUTO-ENTMCNC: 33.6 G/DL (ref 31–37)
MCV RBC AUTO: 87.6 FL (ref 81–100)
MONOCYTES # BLD AUTO: 0.63 X10(3) UL (ref 0.1–0.6)
MONOCYTES NFR BLD AUTO: 4.8 %
NEUTROPHIL ABS PRELIM: 9.78 X10 (3) UL (ref 1.3–6.7)
NEUTROPHILS # BLD AUTO: 9.78 X10(3) UL (ref 1.3–6.7)
NEUTROPHILS NFR BLD AUTO: 73.7 %
PLATELET # BLD AUTO: 419 10(3)UL (ref 150–450)
POTASSIUM SERPL-SCNC: 4.4 MMOL/L (ref 3.6–5.1)
RBC # BLD AUTO: 4.93 X10(6)UL (ref 3.8–5.1)
RED CELL DISTRIBUTION WIDTH-SD: 48.3 FL (ref 35.1–46.3)
SODIUM SERPL-SCNC: 133 MMOL/L (ref 136–144)
TROPONIN: 0.07 NG/ML (ref ?–0.05)
WBC # BLD AUTO: 13.3 X10(3) UL (ref 4–13)

## 2018-02-05 PROCEDURE — 99232 SBSQ HOSP IP/OBS MODERATE 35: CPT | Performed by: INTERNAL MEDICINE

## 2018-02-05 PROCEDURE — 93018 CV STRESS TEST I&R ONLY: CPT | Performed by: INTERNAL MEDICINE

## 2018-02-05 PROCEDURE — 78452 HT MUSCLE IMAGE SPECT MULT: CPT | Performed by: INTERNAL MEDICINE

## 2018-02-05 PROCEDURE — 93017 CV STRESS TEST TRACING ONLY: CPT | Performed by: INTERNAL MEDICINE

## 2018-02-05 RX ORDER — SODIUM CHLORIDE 9 MG/ML
INJECTION, SOLUTION INTRAVENOUS CONTINUOUS
Status: DISCONTINUED | OUTPATIENT
Start: 2018-02-05 | End: 2018-02-08

## 2018-02-05 RX ORDER — NEBIVOLOL 2.5 MG/1
2.5 TABLET ORAL
Status: DISCONTINUED | OUTPATIENT
Start: 2018-02-05 | End: 2018-02-08

## 2018-02-05 RX ORDER — ASPIRIN 81 MG/1
324 TABLET, CHEWABLE ORAL ONCE
Status: DISCONTINUED | OUTPATIENT
Start: 2018-02-06 | End: 2018-02-08

## 2018-02-05 RX ORDER — LOSARTAN POTASSIUM 50 MG/1
50 TABLET ORAL DAILY
Status: DISCONTINUED | OUTPATIENT
Start: 2018-02-05 | End: 2018-02-05

## 2018-02-05 NOTE — PROGRESS NOTES
[facility]  Cardiology Progress Note    Subjective:  No chest pain or shortness of breath. Feels great. Blood pressure trend improved but still elevated.     Objective:  BP (!) 168/49 (BP Location: Left arm)   Pulse 65   Temp 97.7 °F (36.5 °C) (Oral) due to abrupt discontinuation of her b/p therapy. Will add Bystolic 2.4DF PO daily today, continue lisinopril, and she should remain on IV lasix through today.   - Difficulty here is that she has multiple medication \"intolerances\" and is reticent to star

## 2018-02-05 NOTE — PLAN OF CARE
CARDIOVASCULAR - ADULT    • Maintains optimal cardiac output and hemodynamic stability Progressing    • Absence of cardiac arrhythmias or at baseline Progressing          Received pt at 2330. Pt is A&Ox4. Denies pain. CPAP used at night.  crackles heard

## 2018-02-05 NOTE — PROGRESS NOTES
Preliminary Cardiac Diagnostic Note:    No ekg changes noted after pt injected with lexiscan for nuclear stress test  Pt denied cardiac symptoms  Frequent pvcs, few pacs  nuc pending

## 2018-02-05 NOTE — PLAN OF CARE
Assumed care of patient around 0730. A/o x 4, RA, NSR on tele monitor w/ frequent PVCs. Crackles noted to bilateral lung bases. Pt denies chest pain/discomfort or SOB at present. Plan for [address] [place] [address] today. IV lasix BID. CHF nursing protocol in place.  Pt upda

## 2018-02-05 NOTE — RESPIRATORY THERAPY NOTE
TASHA - Equipment Use Daily Summary:                  . Set Mode:AUTO CPAP WITH C-FLEX                . Usage in hours:11:10                . 90% Pressure (EPAP) level:6.4                . 90% Insp. Pressure (IPAP): Stephy Master AHI:1.3                .  Francisco Burks

## 2018-02-05 NOTE — PROGRESS NOTES
[facility]  Progress Note    Khari Barger Patient Status:  Inpatient    1945 MRN [id number]   [facility] 8NE-A Attending Michelle Lebron MD   [facility] Day # 2 PCP Urmila Nguyen DO     CC:  Chest pain, shortness of breath, abdominal sri K 4.4 02/05/2018   CL 97 02/05/2018   CO2 25.0 02/05/2018   GLU 75 02/05/2018   CA 9.0 02/05/2018   MG 2.2 02/05/2018   TROP 0.071 02/05/2018   CK 32 02/05/2018   PGLU 84 02/05/2018       Imaging:  Ultrasound abdomen  FINDINGS:    LIVER:  Normal size and Or      Glucose-Vitamin C (DEX-4) 4-0.006 g chewable tab 8 tablet 8 tablet Oral Q15 Min PRN   Heparin Sodium (Porcine) 5000 UNIT/ML injection 5,000 Units 5,000 Units Subcutaneous Q8H [facility]   acetaminophen (TYLENOL) tab 650 mg 650 mg Oral Q6H PRN   ondanset masses. Bilateral pleural effusions present  6. Bilateral pleural effusions possibly from CHF–continue IV Lasix  7. Diabetes mellitus type 2–hyperglycemia protocol  8. CAD-patient on aspirin, cardiology plans stress test tomorrow  9.  Obstructive sleep apn

## 2018-02-06 ENCOUNTER — APPOINTMENT (OUTPATIENT)
Dept: INTERVENTIONAL RADIOLOGY/VASCULAR | Facility: HOSPITAL | Age: 73
DRG: 286 | End: 2018-02-06
Attending: NURSE PRACTITIONER
Payer: MEDICARE

## 2018-02-06 LAB
APTT PPP: 40.2 SECONDS (ref 25–34)
ATRIAL RATE: 65 BPM
BUN BLD-MCNC: 29 MG/DL (ref 8–20)
CALCIUM BLD-MCNC: 8.9 MG/DL (ref 8.3–10.3)
CHLORIDE: 101 MMOL/L (ref 101–111)
CO2: 27 MMOL/L (ref 22–32)
CREAT BLD-MCNC: 0.78 MG/DL (ref 0.55–1.02)
GLUCOSE BLD-MCNC: 104 MG/DL (ref 65–99)
GLUCOSE BLD-MCNC: 107 MG/DL (ref 65–99)
GLUCOSE BLD-MCNC: 135 MG/DL (ref 65–99)
GLUCOSE BLD-MCNC: 83 MG/DL (ref 70–99)
GLUCOSE BLD-MCNC: 87 MG/DL (ref 65–99)
HAV IGM SER QL: 2.3 MG/DL (ref 1.7–3)
INR BLD: 1.08 (ref 0.89–1.11)
P AXIS: 0 DEGREES
P-R INTERVAL: 208 MS
P2Y12 REACTION UNITS: 57 PRU
POTASSIUM SERPL-SCNC: 4.4 MMOL/L (ref 3.6–5.1)
PRO-BETA NATRIURETIC PEPTIDE: 2108 PG/ML (ref ?–125)
PSA SERPL DL<=0.01 NG/ML-MCNC: 14 SECONDS (ref 12–14.3)
Q-T INTERVAL: 464 MS
QRS DURATION: 96 MS
QTC CALCULATION (BEZET): 482 MS
R AXIS: -59 DEGREES
SODIUM SERPL-SCNC: 138 MMOL/L (ref 136–144)
T AXIS: 53 DEGREES
VENTRICULAR RATE: 65 BPM

## 2018-02-06 PROCEDURE — B41D1ZZ FLUOROSCOPY OF AORTA AND BILATERAL LOWER EXTREMITY ARTERIES USING LOW OSMOLAR CONTRAST: ICD-10-PCS | Performed by: INTERNAL MEDICINE

## 2018-02-06 PROCEDURE — 99232 SBSQ HOSP IP/OBS MODERATE 35: CPT | Performed by: INTERNAL MEDICINE

## 2018-02-06 PROCEDURE — 4A023N7 MEASUREMENT OF CARDIAC SAMPLING AND PRESSURE, LEFT HEART, PERCUTANEOUS APPROACH: ICD-10-PCS | Performed by: INTERNAL MEDICINE

## 2018-02-06 PROCEDURE — B2151ZZ FLUOROSCOPY OF LEFT HEART USING LOW OSMOLAR CONTRAST: ICD-10-PCS | Performed by: INTERNAL MEDICINE

## 2018-02-06 PROCEDURE — B2111ZZ FLUOROSCOPY OF MULTIPLE CORONARY ARTERIES USING LOW OSMOLAR CONTRAST: ICD-10-PCS | Performed by: INTERNAL MEDICINE

## 2018-02-06 RX ORDER — SODIUM CHLORIDE 9 MG/ML
INJECTION, SOLUTION INTRAVENOUS CONTINUOUS
Status: ACTIVE | OUTPATIENT
Start: 2018-02-06 | End: 2018-02-06

## 2018-02-06 RX ORDER — HEPARIN SODIUM 5000 [USP'U]/ML
INJECTION, SOLUTION INTRAVENOUS; SUBCUTANEOUS
Status: COMPLETED
Start: 2018-02-06 | End: 2018-02-06

## 2018-02-06 RX ORDER — POLYETHYLENE GLYCOL 3350 17 G/17G
17 POWDER, FOR SOLUTION ORAL DAILY PRN
Status: DISCONTINUED | OUTPATIENT
Start: 2018-02-06 | End: 2018-02-08

## 2018-02-06 RX ORDER — DOCUSATE SODIUM 100 MG/1
100 CAPSULE, LIQUID FILLED ORAL 2 TIMES DAILY PRN
Status: DISCONTINUED | OUTPATIENT
Start: 2018-02-06 | End: 2018-02-08

## 2018-02-06 RX ORDER — POLYETHYLENE GLYCOL 3350 17 G/17G
17 POWDER, FOR SOLUTION ORAL DAILY
Status: DISCONTINUED | OUTPATIENT
Start: 2018-02-06 | End: 2018-02-06

## 2018-02-06 RX ORDER — MIDAZOLAM HYDROCHLORIDE 1 MG/ML
INJECTION INTRAMUSCULAR; INTRAVENOUS
Status: COMPLETED
Start: 2018-02-06 | End: 2018-02-06

## 2018-02-06 RX ORDER — LIDOCAINE HYDROCHLORIDE 10 MG/ML
INJECTION, SOLUTION INFILTRATION; PERINEURAL
Status: COMPLETED
Start: 2018-02-06 | End: 2018-02-06

## 2018-02-06 RX ORDER — HYDRALAZINE HYDROCHLORIDE 20 MG/ML
10 INJECTION INTRAMUSCULAR; INTRAVENOUS
Status: DISCONTINUED | OUTPATIENT
Start: 2018-02-06 | End: 2018-02-08

## 2018-02-06 NOTE — PLAN OF CARE
Patient post angiogram this am; back at 11 am;   Manual pressure rt groin - per Dr Moe Ramirez BR 5 hours  Rt Groin stable; no bleeding no hematoma; Patient unable to void; bladder scan 483cc;    As per order with straight cath; Procedure discussed with

## 2018-02-06 NOTE — OPERATIVE REPORT
Full note dictated,    50-70% distal LM  Patent Stents with mild-mod disease  90% D1  100% Ramus that fills late  80% ostial Cx  50-70% prox and distal RCA    LVEF 60%    No sig BRENDEN    Rx: Surgical opinion    Kyra Cool MD

## 2018-02-06 NOTE — PROCEDURES
[facility]    PATIENT'S NAME: Rosalie Sonia   ATTENDING PHYSICIAN: Jc Mcginnis M.D. OPERATING PHYSICIAN: Karolina Barrera M.D.    PATIENT ACCOUNT#:   [de-identified]    LOCATION:  [address] [place] [address]  MEDICAL RECORD #:   [id number]       DATE OF BIRTH:  4 distal left main stenosis that is eccentric. The was at least 50% to 70%. There is some haziness in the distal left main that extends primarily into the ostium of the circumflex.     The left anterior descending artery appears to have a stent that extends branch is a bigger branch. There is a stent noted in its midsection where it is patent. The posterior descending artery is a smaller vessel, yet reaches out to the apex.   Diffuse area of stenosis between 50% and 70% from its proximal section into its mid

## 2018-02-06 NOTE — PLAN OF CARE
Alert. Oriented. sr per tele. cpap at noc- compliant. Denies cp, sob. Up ad caleb. For [facility] today. poc updated w/ pt. Cont. to monitor pt.

## 2018-02-06 NOTE — PROGRESS NOTES
[facility]  Progress Note    River Barger Patient Status:  Inpatient    1945 MRN [id number]   [facility] 8NE-A Attending Trina Trimble MD   [facility] Day # 3 PCP Amirah Kinsey DO     CC:  Chest pain, shortness of breath, abdominal sir PTT 40.2 02/06/2018   INR 1.08 02/06/2018   PTP 14.0 02/06/2018   PGLU 87 02/06/2018       Imaging:  Ultrasound abdomen  FINDINGS:    LIVER:  Normal size and echogenicity. No significant masses. BILIARY:  The gallbladder is surgically absent.   There is Heparin Sodium (Porcine) 5000 UNIT/ML injection 5,000 Units 5,000 Units Subcutaneous Q8H [address]   acetaminophen (TYLENOL) tab 650 mg 650 mg Oral Q6H PRN   ondansetron HCl (ZOFRAN) injection 4 mg 4 mg Intravenous Q6H PRN   furosemide (LASIX) injection 20 mg CHF–continue IV Lasix  7. Diabetes mellitus type 2–hyperglycemia protocol  8. CAD-patient on aspirin, cardiology plans stress test tomorrow  9. Obstructive sleep apnea- TASHA protocol  10.  Peripheral vascular disease with previous right leg femoropopliteal b

## 2018-02-06 NOTE — RESPIRATORY THERAPY NOTE
TASHA Equipment Usage Summary :            Set Mode :AUTO CPAP W FLEX          Usage in Hours:6;49          90% Pressure (EPAP) : 8.7           90% Insp Pressure (IPAP);           AHI : 0.9          Supplemental Oxygen :  2    LPM

## 2018-02-07 ENCOUNTER — APPOINTMENT (OUTPATIENT)
Dept: ULTRASOUND IMAGING | Facility: HOSPITAL | Age: 73
DRG: 286 | End: 2018-02-07
Attending: THORACIC SURGERY (CARDIOTHORACIC VASCULAR SURGERY)
Payer: MEDICARE

## 2018-02-07 ENCOUNTER — APPOINTMENT (OUTPATIENT)
Dept: ULTRASOUND IMAGING | Facility: HOSPITAL | Age: 73
DRG: 286 | End: 2018-02-07
Attending: PHYSICIAN ASSISTANT
Payer: MEDICARE

## 2018-02-07 LAB
GLUCOSE BLD-MCNC: 115 MG/DL (ref 65–99)
GLUCOSE BLD-MCNC: 118 MG/DL (ref 65–99)
GLUCOSE BLD-MCNC: 120 MG/DL (ref 65–99)
GLUCOSE BLD-MCNC: 82 MG/DL (ref 65–99)

## 2018-02-07 PROCEDURE — 99232 SBSQ HOSP IP/OBS MODERATE 35: CPT | Performed by: HOSPITALIST

## 2018-02-07 PROCEDURE — 93880 EXTRACRANIAL BILAT STUDY: CPT | Performed by: PHYSICIAN ASSISTANT

## 2018-02-07 PROCEDURE — 93970 EXTREMITY STUDY: CPT | Performed by: THORACIC SURGERY (CARDIOTHORACIC VASCULAR SURGERY)

## 2018-02-07 NOTE — PROGRESS NOTES
[facility]  Progress Note    Lucero Barger Patient Status:  Inpatient    1945 MRN [id number]   [facility] 8NE-A Attending Tracy Barroso MD   [address] Day # 4 PCP Tatum Catherine DO     CC:  Chest pain, shortness of breath, abdominal sri echogenic. SPLEEN:  Normal.  KIDNEYS:  No hydronephrosis. Right kidney measures 10.7 cm. Left kidney measures 10.3 cm. AORTA/IVC:  Patent.      Bilateral pleural effusions noted.     =====  CONCLUSION:  Visualized pancreas appears echogenic which is non Insulin Aspart Pen (NOVOLOG) 100 UNIT/ML flexpen 1-5 Units 1-5 Units Subcutaneous TID CC and HS   AmLODIPine Besylate (NORVASC) tab 5 mg 5 mg Oral Daily       ASSESSMENT / PLAN:     1.  Chest pain and shortness of breath with mild elevated troponin and ac

## 2018-02-07 NOTE — PROGRESS NOTES
[facility]  Cardiology Progress Note    Sarah Beth Barger Patient Status:  Inpatient    1945 MRN [id number]   [facility] 8NE-A Attending Jesenia Olsen MD   [address] Day # 4 PCP Isabella Cooper DO     Subjective:  Tamara Jones to go home.  No chest mg Intravenous BID (Diuretic)   • Insulin Aspart Pen  1-5 Units Subcutaneous TID CC and HS   • AmLODIPine Besylate  5 mg Oral Daily     • sodium chloride 20 mL/hr at 02/06/18 0827         Assessment:    · Chest pain, abnormal stress test with moderate ante

## 2018-02-07 NOTE — PLAN OF CARE
Alert. Oriented. sr w/ freq pvc's. Denies pain, sob. On cpap at [facility]. Hx alvin. Compliant. Up ad caleb. Voided. Rt groin stable- soft, no hematoma noted. poc discussed with patient. Cont. to monitor pt.

## 2018-02-07 NOTE — RESPIRATORY THERAPY NOTE
TASHA Equipment Usage Summary :            Set Mode :AUTO CPAP W FLEX          Usage in Hours:12;16          90% Pressure (EPAP) : 7.9           90% Insp Pressure (IPAP);           AHI : 1.4          Supplemental Oxygen :2      LPM

## 2018-02-08 VITALS
OXYGEN SATURATION: 97 % | HEART RATE: 52 BPM | DIASTOLIC BLOOD PRESSURE: 60 MMHG | SYSTOLIC BLOOD PRESSURE: 147 MMHG | BODY MASS INDEX: 27 KG/M2 | TEMPERATURE: 98 F | RESPIRATION RATE: 16 BRPM | WEIGHT: 145.31 LBS

## 2018-02-08 LAB
ANTIBODY SCREEN: NEGATIVE
BILIRUB UR QL STRIP.AUTO: NEGATIVE
BUN BLD-MCNC: 37 MG/DL (ref 8–20)
CALCIUM BLD-MCNC: 8.8 MG/DL (ref 8.3–10.3)
CHLORIDE: 103 MMOL/L (ref 101–111)
CLARITY UR REFRACT.AUTO: CLEAR
CO2: 23 MMOL/L (ref 22–32)
COLOR UR AUTO: YELLOW
CREAT BLD-MCNC: 1.13 MG/DL (ref 0.55–1.02)
GLUCOSE BLD-MCNC: 100 MG/DL (ref 65–99)
GLUCOSE BLD-MCNC: 83 MG/DL (ref 65–99)
GLUCOSE BLD-MCNC: 89 MG/DL (ref 70–99)
GLUCOSE UR STRIP.AUTO-MCNC: NEGATIVE MG/DL
HAV IGM SER QL: 2.6 MG/DL (ref 1.7–3)
HAV IGM SER QL: 2.6 MG/DL (ref 1.7–3)
NITRITE UR QL STRIP.AUTO: NEGATIVE
PH UR STRIP.AUTO: 5 [PH] (ref 4.5–8)
POTASSIUM SERPL-SCNC: 4.4 MMOL/L (ref 3.6–5.1)
PROT UR STRIP.AUTO-MCNC: NEGATIVE MG/DL
RBC UR QL AUTO: NEGATIVE
RH BLOOD TYPE: POSITIVE
SODIUM SERPL-SCNC: 139 MMOL/L (ref 136–144)
SP GR UR STRIP.AUTO: 1.01 (ref 1–1.03)
UROBILINOGEN UR STRIP.AUTO-MCNC: <2 MG/DL

## 2018-02-08 PROCEDURE — 99239 HOSP IP/OBS DSCHRG MGMT >30: CPT | Performed by: HOSPITALIST

## 2018-02-08 RX ORDER — CLOPIDOGREL BISULFATE 75 MG/1
75 TABLET ORAL DAILY
Qty: 30 TABLET | Refills: 11 | Status: SHIPPED | OUTPATIENT
Start: 2018-02-08 | End: 2018-02-15

## 2018-02-08 RX ORDER — LISINOPRIL 20 MG/1
20 TABLET ORAL DAILY
Qty: 30 TABLET | Refills: 5 | Status: SHIPPED | OUTPATIENT
Start: 2018-02-09 | End: 2018-02-26

## 2018-02-08 RX ORDER — HYDROCHLOROTHIAZIDE 12.5 MG/1
12.5 CAPSULE, GELATIN COATED ORAL DAILY
Qty: 30 CAPSULE | Refills: 2 | Status: SHIPPED | OUTPATIENT
Start: 2018-02-08 | End: 2019-02-20 | Stop reason: ALTCHOICE

## 2018-02-08 RX ORDER — NEBIVOLOL 2.5 MG/1
2.5 TABLET ORAL
Qty: 30 TABLET | Refills: 5 | Status: SHIPPED | OUTPATIENT
Start: 2018-02-09 | End: 2018-02-26

## 2018-02-08 NOTE — PLAN OF CARE
Assumed care around 0730. Pt alert and oriented x4. VSS. SR/SB per tele. Some bigeminal PVCs. Pt asymptomatic. Cardiology and primary notified. BMP and Mag ordered. Within normal limits. Cardiology aware of results no new orders at this time.  Given dose of

## 2018-02-08 NOTE — PLAN OF CARE
Alert. Oriented. sr w/ freq pvc's. Pt very sleepy tonight. Denies pain, sob. plavix dc'd in preparation for cabg Monday. poc discussed with patient. Cot.to monitor pt.

## 2018-02-08 NOTE — RESPIRATORY THERAPY NOTE
TASHA Equipment Usage Summary :            Set Mode :AUTO CPAP W FLEX          Usage in Hours:11;06          90% Pressure (EPAP) : 8           90% Insp Pressure (IPAP);           AHI : 1.9          Supplemental Oxygen :  2    LPM

## 2018-02-08 NOTE — PROGRESS NOTES
[facility]  Cardiology Progress Note    Emma Barger Patient Status:  Inpatient    1945 MRN [id number]   [facility] 8NE-A Attending Brinda Devine MD   [facility] Day # 5 PCP Sabi Rodrigues,      Subjective:  Up walking around.  Denies tolerating current regimen   · CAD with prior PCI  · PVD with prior PTA/stent and (R) SFA/popliteal endarterectomy w/ vein patch '15  · TASHA  · DM    Plan:   1. Continue ASA, BB, ACE, norvasc  2. Tentative plans for cabg next week on Monday.  p2y12 level elie

## 2018-02-08 NOTE — PROGRESS NOTES
Met with patient to discuss pre op teaching, post op expectations, discharge planning post cabg. She lives with her  who will be home and able to help on d/c post cabg.   All questions answered, binder and soap given, will scheduled repeat P2Y12 elie

## 2018-02-08 NOTE — PROGRESS NOTES
[facility]  Progress Note    Luz Marina Barger Patient Status:  Inpatient    1945 MRN [id number]   [facility] 8NE-A Attending Parisa Lorenzana MD   [facility] Day # 5 PCP Peace Kauffman DO     CC:  Chest pain, shortness of breath, abdominal sri intrahepatic biliary ductal dilatation. Common bile duct measures 6 mm. Telma Bucker PANCREAS:  Portions of the pancreas are obscured by bowel gas. Visualized pancreas appears echogenic. SPLEEN:  Normal.  KIDNEYS:  No hydronephrosis.   Right kidney measures 10.7 cm 650 mg 650 mg Oral Q6H PRN   ondansetron HCl (ZOFRAN) injection 4 mg 4 mg Intravenous Q6H PRN   furosemide (LASIX) injection 20 mg 20 mg Intravenous BID (Diuretic)   Insulin Aspart Pen (NOVOLOG) 100 UNIT/ML flexpen 1-5 Units 1-5 Units Subcutaneous TID CC a

## 2018-02-09 ENCOUNTER — PRIOR ORIGINAL RECORDS (OUTPATIENT)
Dept: OTHER | Age: 73
End: 2018-02-09

## 2018-02-09 ENCOUNTER — PATIENT OUTREACH (OUTPATIENT)
Dept: CASE MANAGEMENT | Age: 73
End: 2018-02-09

## 2018-02-09 DIAGNOSIS — I50.1 PULMONARY EDEMA CARDIAC CAUSE (HCC): ICD-10-CM

## 2018-02-09 DIAGNOSIS — I50.31 ACUTE DIASTOLIC CHF (CONGESTIVE HEART FAILURE) (HCC): ICD-10-CM

## 2018-02-10 ENCOUNTER — TELEPHONE (OUTPATIENT)
Dept: FAMILY MEDICINE CLINIC | Facility: CLINIC | Age: 73
End: 2018-02-10

## 2018-02-10 RX ORDER — FUROSEMIDE 20 MG/1
20 TABLET ORAL DAILY
Qty: 3 TABLET | Refills: 0 | Status: SHIPPED | OUTPATIENT
Start: 2018-02-10 | End: 2018-02-14

## 2018-02-10 NOTE — TELEPHONE ENCOUNTER
[facility] ON Thursday FOR FLUID AROUND HEART AND LUNGS. SHE WAS SUPPOSE TO [facility], BUT IT WAS NOT TW'T. SHE THINKS SHE NEEDS THIS MED DUE TO HAVING SURGERY FOR HEART 2-20-18.     [address] AND BOUGHT

## 2018-02-10 NOTE — TELEPHONE ENCOUNTER
PT GOT RELEASED FROM THE HOSPITAL ON Thursday FOR FLUID AROUND HEART AND LUNGS.     SHE WAS SUPPOSE TO [address] WITH LASIX, BUT IT WAS NOT RX'D.     SHE THINKS SHE NEEDS THIS MED DUE TO HAVING SURGERY FOR HEART 2-20-18.     [address] [place] [address]

## 2018-02-10 NOTE — DISCHARGE SUMMARY
[facility] H[facility]PITAL[facility]  DISCHARGE SUMMARY     Soha Barger Patient Status:  Inpatient    1945 MRN [id number]   [facility] 8NE-A Attending No att. providers found   Hosp Day # 5 PCP Jose Luis Smith DO     Date of Admission: 2/3/2018  Date of multivessel disease. She was seen by CV surgery and CABG was planning for the follow week. Patient remained stable and had no symptoms of SOB or angina . She was discharged home in good condition. Her plavix was held in preparation for surgery on 2/12. mouth daily. Refills:  0     Clopidogrel Bisulfate 75 MG Tabs  Commonly known as:  PLAVIX      Take 1 tablet (75 mg total) by mouth daily.    Stop taking on:  2/15/2018  Quantity:  30 tablet  Refills:  11     FREESTYLE LITE TEST Strp      USE TO TEST BLOO or gallops. Abdomen: Soft, nontender, nondistended. Positive bowel sounds. No rebound or guarding. Neurologic: No focal neurological deficits. Musculoskeletal: Moves all extremities. Extremities: No edema.   ------------------------------------------

## 2018-02-12 ENCOUNTER — TELEPHONE (OUTPATIENT)
Dept: FAMILY MEDICINE CLINIC | Facility: CLINIC | Age: 73
End: 2018-02-12

## 2018-02-12 NOTE — TELEPHONE ENCOUNTER
Patient discharged home from [facility] on 2/8/17 and has a TCM HFU on 2/14/18. FYI condition update: Patient reports that she is feeling great. Patient denies fever, redness, swelling, drainage or separation of incision edges.  Patient states that

## 2018-02-12 NOTE — TELEPHONE ENCOUNTER
FYI    Patient discharged home from [facility] on 2/8/17 and has a TCM HFU on 2/14/18.     FYI condition update: Patient reports that she is feeling great. Patient denies fever, redness, swelling, drainage or separation of incision edges.  Patient stat

## 2018-02-12 NOTE — PROGRESS NOTES
Initial Post Discharge Follow Up   Discharge Date: 2/8/18  Contact Date: 2/9/2018    Consent Verification:  Assessment Completed With: Patient  HIPAA Verified?   Yes    Discharge Dx:    Acute Pulmonary edema, Diastolic CHF    General:   • How have you be Disp: 30 tablet Rfl: 5   Nebivolol HCl 2.5 MG Oral Tab Take 1 tablet (2.5 mg total) by mouth Daily Beta Blocker. Disp: 30 tablet Rfl: 5   hydrochlorothiazide 12.5 MG Oral Cap Take 1 capsule (12.5 mg total) by mouth daily.  Disp: 30 capsule Rfl: 2   Clopidog ordered at D/C? No     Services ordered at D/C? No, not at this time. DX: specifics:  Have you been experiencing any symptoms since you returned home from the hospital?  No  Any shortness of breath?   no  Did you have a procedure (cardiac cath or angiog questions or needs, she states she will. [x]  Discharge Summary, Discharge medications reviewed/discussed/and reconciled with patient, and orders reviewed and discussed. Any changes or updates to medications and or orders sent to PCP.

## 2018-02-12 NOTE — TELEPHONE ENCOUNTER
It is post op protocol ; if readings do not call for insulin then none will be given   If she receives it post op we can still work for d/c after surgery

## 2018-02-14 ENCOUNTER — OFFICE VISIT (OUTPATIENT)
Dept: FAMILY MEDICINE CLINIC | Facility: CLINIC | Age: 73
End: 2018-02-14

## 2018-02-14 VITALS
RESPIRATION RATE: 22 BRPM | TEMPERATURE: 99 F | HEIGHT: 62 IN | WEIGHT: 143 LBS | DIASTOLIC BLOOD PRESSURE: 60 MMHG | HEART RATE: 68 BPM | OXYGEN SATURATION: 98 % | BODY MASS INDEX: 26.31 KG/M2 | SYSTOLIC BLOOD PRESSURE: 138 MMHG

## 2018-02-14 DIAGNOSIS — E08.51 DIABETES MELLITUS DUE TO UNDERLYING CONDITION WITH DIABETIC PERIPHERAL ANGIOPATHY WITHOUT GANGRENE, WITH LONG-TERM CURRENT USE OF INSULIN (HCC): ICD-10-CM

## 2018-02-14 DIAGNOSIS — I25.10 CORONARY ARTERY DISEASE INVOLVING NATIVE CORONARY ARTERY OF NATIVE HEART WITHOUT ANGINA PECTORIS: ICD-10-CM

## 2018-02-14 DIAGNOSIS — Z79.4 DIABETES MELLITUS DUE TO UNDERLYING CONDITION WITH DIABETIC PERIPHERAL ANGIOPATHY WITHOUT GANGRENE, WITH LONG-TERM CURRENT USE OF INSULIN (HCC): ICD-10-CM

## 2018-02-14 DIAGNOSIS — I73.9 CLAUDICATION (HCC): ICD-10-CM

## 2018-02-14 DIAGNOSIS — I10 BENIGN ESSENTIAL HTN: ICD-10-CM

## 2018-02-14 DIAGNOSIS — I50.31 ACUTE DIASTOLIC CHF (CONGESTIVE HEART FAILURE) (HCC): ICD-10-CM

## 2018-02-14 DIAGNOSIS — G47.33 OSA ON CPAP: Primary | ICD-10-CM

## 2018-02-14 DIAGNOSIS — E11.3292 MILD NONPROLIFERATIVE DIABETIC RETINOPATHY OF LEFT EYE WITHOUT MACULAR EDEMA ASSOCIATED WITH TYPE 2 DIABETES MELLITUS (HCC): ICD-10-CM

## 2018-02-14 DIAGNOSIS — Z79.4 TYPE 2 DIABETES MELLITUS WITH COMPLICATION, WITH LONG-TERM CURRENT USE OF INSULIN (HCC): ICD-10-CM

## 2018-02-14 DIAGNOSIS — I50.1 PULMONARY EDEMA CARDIAC CAUSE (HCC): ICD-10-CM

## 2018-02-14 DIAGNOSIS — I73.9 PERIPHERAL VASCULAR DISEASE OF LOWER EXTREMITY (HCC): ICD-10-CM

## 2018-02-14 DIAGNOSIS — M79.7 FIBROMYALGIA: ICD-10-CM

## 2018-02-14 DIAGNOSIS — Z99.89 OSA ON CPAP: Primary | ICD-10-CM

## 2018-02-14 DIAGNOSIS — Z98.890 H/O PERIPHERAL ARTERY BYPASS: ICD-10-CM

## 2018-02-14 DIAGNOSIS — E11.8 TYPE 2 DIABETES MELLITUS WITH COMPLICATION, WITH LONG-TERM CURRENT USE OF INSULIN (HCC): ICD-10-CM

## 2018-02-14 PROCEDURE — 99496 TRANSJ CARE MGMT HIGH F2F 7D: CPT | Performed by: FAMILY MEDICINE

## 2018-02-15 NOTE — PROGRESS NOTES
HPI:    Claudene Hails is a 67year old female here today for hospital follow up.    She was discharged from Inpatient hospital, [facility] to Home   Admission Date: 2/3/18   Discharge Date: 2/8/18  Hospital Discharge Diagnoses (since 1/15/2018) mouth daily. Clopidogrel Bisulfate 75 MG Oral Tab Take 1 tablet (75 mg total) by mouth daily. AmLODIPine Besylate 5 MG Oral Tab Take 1 tablet (5 mg total) by mouth daily. Biotin 5000 MCG Oral Cap Take 1 tablet by mouth daily.    Specialty Vitamins Pro (coronary) (2014); angioplasty (coronary) (); angioplasty (coronary) (2014); angioplasty (coronary) (5/12/15); cath drug eluting stent; tonsillectomy; ; cataract; ablation; and cath percutaneous  transluminal coronary angioplasty.     She f extremities  EXTREMITIES: no cyanosis, clubbing or edema  Bilateral barefoot skin diabetic exam is normal, visualized feet and the appearance is normal.  Bilateral monofilament/sensation of both feet is normal.  Pulsation pedal pulse exam of both lower leg severe  · Amount and/or Complexity of Data to Be Reviewed: severe  · Risk of Significant Complications, Morbidity, and/or Mortality: severe    Overall Risk:   severe    Patient seen within 7 days from date of discharge.      Summer Wheeler DO, 2/14/2018

## 2018-02-16 ENCOUNTER — ANESTHESIA EVENT (OUTPATIENT)
Dept: CARDIAC SURGERY | Facility: HOSPITAL | Age: 73
DRG: 236 | End: 2018-02-16
Payer: MEDICARE

## 2018-02-20 ENCOUNTER — APPOINTMENT (OUTPATIENT)
Dept: GENERAL RADIOLOGY | Facility: HOSPITAL | Age: 73
DRG: 236 | End: 2018-02-20
Attending: THORACIC SURGERY (CARDIOTHORACIC VASCULAR SURGERY)
Payer: MEDICARE

## 2018-02-20 ENCOUNTER — HOSPITAL ENCOUNTER (INPATIENT)
Facility: HOSPITAL | Age: 73
LOS: 6 days | Discharge: HOME HEALTH | DRG: 236 | End: 2018-02-26
Attending: THORACIC SURGERY (CARDIOTHORACIC VASCULAR SURGERY) | Admitting: THORACIC SURGERY (CARDIOTHORACIC VASCULAR SURGERY)
Payer: MEDICARE

## 2018-02-20 ENCOUNTER — ANESTHESIA (OUTPATIENT)
Dept: CARDIAC SURGERY | Facility: HOSPITAL | Age: 73
DRG: 236 | End: 2018-02-20
Payer: MEDICARE

## 2018-02-20 ENCOUNTER — SURGERY (OUTPATIENT)
Age: 73
End: 2018-02-20

## 2018-02-20 DIAGNOSIS — I25.10 CAD (CORONARY ARTERY DISEASE): Primary | ICD-10-CM

## 2018-02-20 PROCEDURE — 93005 ELECTROCARDIOGRAM TRACING: CPT

## 2018-02-20 PROCEDURE — 85610 PROTHROMBIN TIME: CPT | Performed by: THORACIC SURGERY (CARDIOTHORACIC VASCULAR SURGERY)

## 2018-02-20 PROCEDURE — 87081 CULTURE SCREEN ONLY: CPT | Performed by: THORACIC SURGERY (CARDIOTHORACIC VASCULAR SURGERY)

## 2018-02-20 PROCEDURE — 82803 BLOOD GASES ANY COMBINATION: CPT | Performed by: ANESTHESIOLOGY

## 2018-02-20 PROCEDURE — 06BQ4ZZ EXCISION OF LEFT SAPHENOUS VEIN, PERCUTANEOUS ENDOSCOPIC APPROACH: ICD-10-PCS | Performed by: THORACIC SURGERY (CARDIOTHORACIC VASCULAR SURGERY)

## 2018-02-20 PROCEDURE — 71045 X-RAY EXAM CHEST 1 VIEW: CPT | Performed by: THORACIC SURGERY (CARDIOTHORACIC VASCULAR SURGERY)

## 2018-02-20 PROCEDURE — 80048 BASIC METABOLIC PNL TOTAL CA: CPT | Performed by: THORACIC SURGERY (CARDIOTHORACIC VASCULAR SURGERY)

## 2018-02-20 PROCEDURE — 83735 ASSAY OF MAGNESIUM: CPT | Performed by: THORACIC SURGERY (CARDIOTHORACIC VASCULAR SURGERY)

## 2018-02-20 PROCEDURE — 82803 BLOOD GASES ANY COMBINATION: CPT

## 2018-02-20 PROCEDURE — 85014 HEMATOCRIT: CPT

## 2018-02-20 PROCEDURE — 82330 ASSAY OF CALCIUM: CPT

## 2018-02-20 PROCEDURE — 021009W BYPASS CORONARY ARTERY, ONE ARTERY FROM AORTA WITH AUTOLOGOUS VENOUS TISSUE, OPEN APPROACH: ICD-10-PCS | Performed by: THORACIC SURGERY (CARDIOTHORACIC VASCULAR SURGERY)

## 2018-02-20 PROCEDURE — 85018 HEMOGLOBIN: CPT | Performed by: ANESTHESIOLOGY

## 2018-02-20 PROCEDURE — 85027 COMPLETE CBC AUTOMATED: CPT | Performed by: THORACIC SURGERY (CARDIOTHORACIC VASCULAR SURGERY)

## 2018-02-20 PROCEDURE — S0028 INJECTION, FAMOTIDINE, 20 MG: HCPCS | Performed by: THORACIC SURGERY (CARDIOTHORACIC VASCULAR SURGERY)

## 2018-02-20 PROCEDURE — P9047 ALBUMIN (HUMAN), 25%, 50ML: HCPCS

## 2018-02-20 PROCEDURE — 94660 CPAP INITIATION&MGMT: CPT

## 2018-02-20 PROCEDURE — 85730 THROMBOPLASTIN TIME PARTIAL: CPT | Performed by: THORACIC SURGERY (CARDIOTHORACIC VASCULAR SURGERY)

## 2018-02-20 PROCEDURE — 84295 ASSAY OF SERUM SODIUM: CPT

## 2018-02-20 PROCEDURE — 5A1221Z PERFORMANCE OF CARDIAC OUTPUT, CONTINUOUS: ICD-10-PCS | Performed by: THORACIC SURGERY (CARDIOTHORACIC VASCULAR SURGERY)

## 2018-02-20 PROCEDURE — 02100Z9 BYPASS CORONARY ARTERY, ONE ARTERY FROM LEFT INTERNAL MAMMARY, OPEN APPROACH: ICD-10-PCS | Performed by: THORACIC SURGERY (CARDIOTHORACIC VASCULAR SURGERY)

## 2018-02-20 PROCEDURE — 85347 COAGULATION TIME ACTIVATED: CPT

## 2018-02-20 PROCEDURE — 94002 VENT MGMT INPAT INIT DAY: CPT

## 2018-02-20 PROCEDURE — 02100KW BYPASS CORONARY ARTERY, ONE ARTERY FROM AORTA WITH NONAUTOLOGOUS TISSUE SUBSTITUTE, OPEN APPROACH: ICD-10-PCS | Performed by: THORACIC SURGERY (CARDIOTHORACIC VASCULAR SURGERY)

## 2018-02-20 PROCEDURE — 94150 VITAL CAPACITY TEST: CPT

## 2018-02-20 PROCEDURE — 85025 COMPLETE CBC W/AUTO DIFF WBC: CPT | Performed by: THORACIC SURGERY (CARDIOTHORACIC VASCULAR SURGERY)

## 2018-02-20 PROCEDURE — 82375 ASSAY CARBOXYHB QUANT: CPT | Performed by: ANESTHESIOLOGY

## 2018-02-20 PROCEDURE — 93010 ELECTROCARDIOGRAM REPORT: CPT | Performed by: INTERNAL MEDICINE

## 2018-02-20 PROCEDURE — 86920 COMPATIBILITY TEST SPIN: CPT

## 2018-02-20 PROCEDURE — 80053 COMPREHEN METABOLIC PANEL: CPT | Performed by: THORACIC SURGERY (CARDIOTHORACIC VASCULAR SURGERY)

## 2018-02-20 PROCEDURE — 85384 FIBRINOGEN ACTIVITY: CPT | Performed by: THORACIC SURGERY (CARDIOTHORACIC VASCULAR SURGERY)

## 2018-02-20 PROCEDURE — 84132 ASSAY OF SERUM POTASSIUM: CPT

## 2018-02-20 PROCEDURE — 85049 AUTOMATED PLATELET COUNT: CPT | Performed by: THORACIC SURGERY (CARDIOTHORACIC VASCULAR SURGERY)

## 2018-02-20 PROCEDURE — S0028 INJECTION, FAMOTIDINE, 20 MG: HCPCS

## 2018-02-20 PROCEDURE — 82962 GLUCOSE BLOOD TEST: CPT

## 2018-02-20 PROCEDURE — 83050 HGB METHEMOGLOBIN QUAN: CPT | Performed by: ANESTHESIOLOGY

## 2018-02-20 RX ORDER — MAGNESIUM SULFATE 1 G/100ML
1 INJECTION INTRAVENOUS AS NEEDED
Status: DISCONTINUED | OUTPATIENT
Start: 2018-02-20 | End: 2018-02-21

## 2018-02-20 RX ORDER — ASPIRIN 300 MG
300 SUPPOSITORY, RECTAL RECTAL ONCE
Status: COMPLETED | OUTPATIENT
Start: 2018-02-20 | End: 2018-02-20

## 2018-02-20 RX ORDER — SODIUM CHLORIDE 9 MG/ML
INJECTION, SOLUTION INTRAVENOUS CONTINUOUS
Status: DISCONTINUED | OUTPATIENT
Start: 2018-02-20 | End: 2018-02-22

## 2018-02-20 RX ORDER — SODIUM CHLORIDE 9 MG/ML
INJECTION, SOLUTION INTRAVENOUS CONTINUOUS
Status: CANCELLED | OUTPATIENT
Start: 2018-02-20

## 2018-02-20 RX ORDER — NITROGLYCERIN 20 MG/100ML
INJECTION INTRAVENOUS CONTINUOUS PRN
Status: DISCONTINUED | OUTPATIENT
Start: 2018-02-20 | End: 2018-02-22

## 2018-02-20 RX ORDER — FAMOTIDINE 10 MG/ML
20 INJECTION, SOLUTION INTRAVENOUS 2 TIMES DAILY
Status: DISCONTINUED | OUTPATIENT
Start: 2018-02-20 | End: 2018-02-21

## 2018-02-20 RX ORDER — CEFAZOLIN SODIUM/WATER 2 G/20 ML
2 SYRINGE (ML) INTRAVENOUS EVERY 8 HOURS
Status: COMPLETED | OUTPATIENT
Start: 2018-02-20 | End: 2018-02-22

## 2018-02-20 RX ORDER — DOCUSATE SODIUM 100 MG/1
100 CAPSULE, LIQUID FILLED ORAL 2 TIMES DAILY
Status: DISCONTINUED | OUTPATIENT
Start: 2018-02-20 | End: 2018-02-26

## 2018-02-20 RX ORDER — ACETAMINOPHEN 10 MG/ML
1000 INJECTION, SOLUTION INTRAVENOUS EVERY 6 HOURS
Status: DISPENSED | OUTPATIENT
Start: 2018-02-20 | End: 2018-02-21

## 2018-02-20 RX ORDER — BISACODYL 10 MG
10 SUPPOSITORY, RECTAL RECTAL
Status: DISCONTINUED | OUTPATIENT
Start: 2018-02-20 | End: 2018-02-26

## 2018-02-20 RX ORDER — MAGNESIUM SULFATE HEPTAHYDRATE 40 MG/ML
2 INJECTION, SOLUTION INTRAVENOUS AS NEEDED
Status: DISCONTINUED | OUTPATIENT
Start: 2018-02-20 | End: 2018-02-21

## 2018-02-20 RX ORDER — CHLORHEXIDINE GLUCONATE 0.12 MG/ML
15 RINSE ORAL
Status: DISCONTINUED | OUTPATIENT
Start: 2018-02-20 | End: 2018-02-21

## 2018-02-20 RX ORDER — DEXMEDETOMIDINE HYDROCHLORIDE 4 UG/ML
INJECTION, SOLUTION INTRAVENOUS CONTINUOUS
Status: DISCONTINUED | OUTPATIENT
Start: 2018-02-20 | End: 2018-02-21

## 2018-02-20 RX ORDER — MORPHINE SULFATE 2 MG/ML
2 INJECTION, SOLUTION INTRAMUSCULAR; INTRAVENOUS
Status: DISCONTINUED | OUTPATIENT
Start: 2018-02-20 | End: 2018-02-26

## 2018-02-20 RX ORDER — MIDAZOLAM HYDROCHLORIDE 1 MG/ML
1 INJECTION INTRAMUSCULAR; INTRAVENOUS EVERY 30 MIN PRN
Status: DISCONTINUED | OUTPATIENT
Start: 2018-02-20 | End: 2018-02-21

## 2018-02-20 RX ORDER — DOBUTAMINE HYDROCHLORIDE 200 MG/100ML
INJECTION INTRAVENOUS CONTINUOUS PRN
Status: DISCONTINUED | OUTPATIENT
Start: 2018-02-20 | End: 2018-02-22

## 2018-02-20 RX ORDER — TEMAZEPAM 15 MG/1
15 CAPSULE ORAL NIGHTLY PRN
Status: DISCONTINUED | OUTPATIENT
Start: 2018-02-20 | End: 2018-02-26

## 2018-02-20 RX ORDER — FAMOTIDINE 20 MG/1
20 TABLET ORAL 2 TIMES DAILY
Status: DISCONTINUED | OUTPATIENT
Start: 2018-02-20 | End: 2018-02-21

## 2018-02-20 RX ORDER — ASPIRIN 325 MG
325 TABLET ORAL ONCE
Status: COMPLETED | OUTPATIENT
Start: 2018-02-20 | End: 2018-02-20

## 2018-02-20 RX ORDER — CEFAZOLIN SODIUM 1 G/3ML
INJECTION, POWDER, FOR SOLUTION INTRAMUSCULAR; INTRAVENOUS
Status: DISCONTINUED | OUTPATIENT
Start: 2018-02-20 | End: 2018-02-20 | Stop reason: HOSPADM

## 2018-02-20 RX ORDER — MORPHINE SULFATE 2 MG/ML
4 INJECTION, SOLUTION INTRAMUSCULAR; INTRAVENOUS
Status: DISCONTINUED | OUTPATIENT
Start: 2018-02-20 | End: 2018-02-26

## 2018-02-20 RX ORDER — POTASSIUM CHLORIDE 29.8 MG/ML
40 INJECTION INTRAVENOUS AS NEEDED
Status: DISCONTINUED | OUTPATIENT
Start: 2018-02-20 | End: 2018-02-21

## 2018-02-20 RX ORDER — POLYETHYLENE GLYCOL 3350 17 G/17G
1 POWDER, FOR SOLUTION ORAL DAILY PRN
Status: DISCONTINUED | OUTPATIENT
Start: 2018-02-20 | End: 2018-02-26

## 2018-02-20 RX ORDER — DEXTROSE AND SODIUM CHLORIDE 5; .45 G/100ML; G/100ML
INJECTION, SOLUTION INTRAVENOUS CONTINUOUS
Status: ACTIVE | OUTPATIENT
Start: 2018-02-20 | End: 2018-02-20

## 2018-02-20 RX ORDER — MORPHINE SULFATE 2 MG/ML
8 INJECTION, SOLUTION INTRAMUSCULAR; INTRAVENOUS
Status: DISCONTINUED | OUTPATIENT
Start: 2018-02-20 | End: 2018-02-22

## 2018-02-20 RX ORDER — ONDANSETRON 2 MG/ML
4 INJECTION INTRAMUSCULAR; INTRAVENOUS EVERY 6 HOURS PRN
Status: DISCONTINUED | OUTPATIENT
Start: 2018-02-20 | End: 2018-02-26

## 2018-02-20 RX ORDER — ASPIRIN 325 MG
325 TABLET, DELAYED RELEASE (ENTERIC COATED) ORAL DAILY
Status: DISCONTINUED | OUTPATIENT
Start: 2018-02-21 | End: 2018-02-23

## 2018-02-20 RX ORDER — DEXTROSE AND SODIUM CHLORIDE 5; .45 G/100ML; G/100ML
INJECTION, SOLUTION INTRAVENOUS CONTINUOUS
Status: ACTIVE | OUTPATIENT
Start: 2018-02-20 | End: 2018-02-21

## 2018-02-20 RX ORDER — ALBUMIN, HUMAN INJ 5% 5 %
250 SOLUTION INTRAVENOUS ONCE AS NEEDED
Status: COMPLETED | OUTPATIENT
Start: 2018-02-20 | End: 2018-02-21

## 2018-02-20 RX ORDER — DEXTROSE MONOHYDRATE 25 G/50ML
50 INJECTION, SOLUTION INTRAVENOUS
Status: DISCONTINUED | OUTPATIENT
Start: 2018-02-20 | End: 2018-02-21

## 2018-02-20 RX ORDER — POTASSIUM CHLORIDE 14.9 MG/ML
20 INJECTION INTRAVENOUS AS NEEDED
Status: DISCONTINUED | OUTPATIENT
Start: 2018-02-20 | End: 2018-02-21

## 2018-02-20 RX ORDER — ASPIRIN 300 MG
300 SUPPOSITORY, RECTAL RECTAL DAILY
Status: DISCONTINUED | OUTPATIENT
Start: 2018-02-21 | End: 2018-02-21

## 2018-02-20 NOTE — PLAN OF CARE
Received patient from [facility] 23 s/P CABG x3 @ 0405. Patient is vented and sedated. Precedex, insulin, dobutamine gtts infusing. % paced @ 80. Dr. Brittany Barr @ bedside and pacer paused, patient has an underlying HR NSR with bigeminy.  Orders to give 100 mg Lidocai

## 2018-02-20 NOTE — PROGRESS NOTES
[facility]  Progress Note        Dakota Barger Patient Status:  Inpatient    1945 MRN [id number]   [facility] 6NE-A Attending Sandy Gamez MD   Hosp Day # 0 PCP Belén De Leon DO     See hospital consult note from Dr. Dannie Bansal dated 2/3 Medications:    • Chlorhexidine Gluconate  15 mL Mouth/Throat Gail@[employer]   • aspirin  300 mg Rectal Once    Or   • aspirin  325 mg Per NG Tube Once   • [START ON 2/21/2018] aspirin EC  325 mg Oral Daily    Or   • [START ON 2/21/2018] aspirin no acute pattern on post-op EKG. Weaning nitroglycerin and dobutamine wean per Dr. Moi Cleaning. On ASA and BB ordered with parameters. Intolerant to statin and plan for PCSK9 inhibitor injection as outpatient. D/w CCU RN at bedside.     Rachana Rashid MD

## 2018-02-20 NOTE — ANESTHESIA PREPROCEDURE EVALUATION
PRE-OP EVALUATION    Patient Name: Alfonzo Reynaga    Pre-op Diagnosis: coronary artery disease    Procedure(s):  coronary artery bypass graft    Surgeon(s) and Role:     * Yvan Wood MD - Primary    Pre-op vitals reviewed.         Body mass index is 26.52 the moderate anterior defect, the patient will be seen by Cardiovascular Surgery for consideration of coronary bypass grafting surgery.     Dictated By Elizabeth Martínez M.D.  d:     02/06/2018 10:55:56  t:      02/06/2018 11:08:51  [facility]   [id number] ANGIOPLASTY (CORONARY)      Comment: Rt common fem arthroplasry, stent  5/12/15: ANGIOPLASTY (CORONARY)      Comment: right popliteal  No date:   No date: CATARACT  No date: CATH DRUG ELUTING STENT  No date: CATH PERCUTANEOUS  TRANSLUMINAL CORONAR guidelines. Patient has taken beta blockers in last 24 hours. Post-procedure pain management plan discussed with surgeon and patient.     Comment: Risks and benefits of GA with needed invasive monitoring including art line, cordis, with or without swan ga

## 2018-02-20 NOTE — DIETARY NOTE
Nutrition Short Note    Dietitian consult received per cardiac rehab protocol. Pt to be educated by cardiac rehab staff and encouraged to attend outpatient classes taught by RD. RD available PRN.     Gamal Hanks RD, LDN

## 2018-02-20 NOTE — ANESTHESIA POSTPROCEDURE EVALUATION
[address] [facility] Patient Status:  Inpatient   Age/Gender 67year old female MRN [id number]   [facility] 6NE-A Attending Aundrea Medrano MD   Hosp Day # 0 PCP Jesus Waltesr DO       Anesthesia Post-op Note    Procedure(s):  coronar

## 2018-02-21 ENCOUNTER — APPOINTMENT (OUTPATIENT)
Dept: GENERAL RADIOLOGY | Facility: HOSPITAL | Age: 73
DRG: 236 | End: 2018-02-21
Attending: THORACIC SURGERY (CARDIOTHORACIC VASCULAR SURGERY)
Payer: MEDICARE

## 2018-02-21 PROCEDURE — 93010 ELECTROCARDIOGRAM REPORT: CPT | Performed by: INTERNAL MEDICINE

## 2018-02-21 PROCEDURE — 93005 ELECTROCARDIOGRAM TRACING: CPT

## 2018-02-21 PROCEDURE — 97530 THERAPEUTIC ACTIVITIES: CPT

## 2018-02-21 PROCEDURE — 71045 X-RAY EXAM CHEST 1 VIEW: CPT | Performed by: THORACIC SURGERY (CARDIOTHORACIC VASCULAR SURGERY)

## 2018-02-21 PROCEDURE — 85025 COMPLETE CBC W/AUTO DIFF WBC: CPT | Performed by: THORACIC SURGERY (CARDIOTHORACIC VASCULAR SURGERY)

## 2018-02-21 PROCEDURE — 80048 BASIC METABOLIC PNL TOTAL CA: CPT | Performed by: THORACIC SURGERY (CARDIOTHORACIC VASCULAR SURGERY)

## 2018-02-21 PROCEDURE — 97162 PT EVAL MOD COMPLEX 30 MIN: CPT

## 2018-02-21 PROCEDURE — 82962 GLUCOSE BLOOD TEST: CPT

## 2018-02-21 PROCEDURE — P9045 ALBUMIN (HUMAN), 5%, 250 ML: HCPCS | Performed by: THORACIC SURGERY (CARDIOTHORACIC VASCULAR SURGERY)

## 2018-02-21 PROCEDURE — 83735 ASSAY OF MAGNESIUM: CPT | Performed by: THORACIC SURGERY (CARDIOTHORACIC VASCULAR SURGERY)

## 2018-02-21 RX ORDER — CLOPIDOGREL BISULFATE 75 MG/1
75 TABLET ORAL DAILY
Status: DISCONTINUED | OUTPATIENT
Start: 2018-02-21 | End: 2018-02-26

## 2018-02-21 RX ORDER — ALBUMIN, HUMAN INJ 5% 5 %
250 SOLUTION INTRAVENOUS ONCE
Status: COMPLETED | OUTPATIENT
Start: 2018-02-21 | End: 2018-02-21

## 2018-02-21 RX ORDER — DEXTROSE MONOHYDRATE 25 G/50ML
50 INJECTION, SOLUTION INTRAVENOUS
Status: DISCONTINUED | OUTPATIENT
Start: 2018-02-21 | End: 2018-02-26

## 2018-02-21 RX ORDER — ACETAMINOPHEN 10 MG/ML
1000 INJECTION, SOLUTION INTRAVENOUS EVERY 6 HOURS
Status: COMPLETED | OUTPATIENT
Start: 2018-02-21 | End: 2018-02-22

## 2018-02-21 RX ORDER — GARLIC EXTRACT 500 MG
1 CAPSULE ORAL DAILY
Status: DISCONTINUED | OUTPATIENT
Start: 2018-02-21 | End: 2018-02-26

## 2018-02-21 RX ORDER — FAMOTIDINE 20 MG/1
20 TABLET ORAL DAILY
Status: DISCONTINUED | OUTPATIENT
Start: 2018-02-22 | End: 2018-02-26

## 2018-02-21 NOTE — PROGRESS NOTES
[facility] Note:  Renal Dose Adjustment    John Mills has been prescribed famotidine (PEPCID) 20mg  IV/PO  every 12 hours. Estimated Creatinine Clearance: 30.7 mL/min (based on SCr of 1.29 mg/dL (H)).     Her calculated creatinine clearance is <50 ml

## 2018-02-21 NOTE — PROGRESS NOTES
[facility]  CV Surgery Progress Note    Evangelista Barger Patient Status:  Inpatient    1945 MRN [id number]   [facility] 6NE-A Attending Braden Morales MD   Hosp Day # 1 PCP Augusto Causey DO     Subjective:  Pt seen in bed after episode

## 2018-02-21 NOTE — PLAN OF CARE
Problem: CARDIOVASCULAR - ADULT  Goal: Maintains optimal cardiac output and hemodynamic stability  INTERVENTIONS:  - Monitor vital signs, rhythm, and trends  - Monitor for bleeding, hypotension and signs of decreased cardiac output  - Evaluate effectivenes appropriate pain scale  - Administer analgesics based on type and severity of pain and evaluate response  - Implement non-pharmacological measures as appropriate and evaluate response  - Consider cultural and social influences on pain and pain management

## 2018-02-21 NOTE — PROGRESS NOTES
[facility]  Progress Note    Rod Barger Patient Status:  Inpatient    1945 MRN [id number]   [facility] 6NE-A Attending Stefan Strong MD   Hosp Day # 1 PCP Deonna Payment, DO       Subjective:  Complaints of slight chest pain and n 8.1 02/21/2018   MG 2.6 02/21/2018       CXR:      Medications:    • Insulin Aspart Pen  1-20 Units Subcutaneous TID AC   • [START ON 2/22/2018] Insulin Aspart Pen  1-25 Units Subcutaneous Once   • Clopidogrel Bisulfate  75 mg Oral Daily   • acetaminophen

## 2018-02-21 NOTE — PHYSICAL THERAPY NOTE
PHYSICAL THERAPY EVALUATION - INPATIENT     Room Number: 5486/2723-N  Evaluation Date: 2/21/2018  Type of Evaluation: Initial  Physician Order: PT Eval and Treat    Presenting Problem: CABG s/p2/20/18  Reason for Therapy: Mobility Dysfunction and Dis date:  CATARACT  No date: CATH DRUG ELUTING STENT  No date: CATH PERCUTANEOUS  TRANSLUMINAL CORONARY ANGIO*      Comment: cardiac and peripheral  '95: CHOLECYSTECTOMY  abdominoplasty '98: OTHER SURGICAL HISTORY  breast reduction '98: OTHER SURGICAL HISTORY following:    Right Hip flexion  4-/5  Left Hip flexion  4-/5  Right Knee extension  4/5  Left Knee extension  4/5  Right Dorsiflexion  4-/5    BALANCE  Static Sitting: Fair  Dynamic Sitting: Fair -  Static Standing: Poor -  Dynamic Standing: Poor -    ADD 2-3 minutes lying supine was 105/53. Pt able to recall 3/3 sternal precautions at end of session. Pt ended session supine in bed w/call light in reach. RN aware. Next session, have pt transfer from sit<>stand w/rw for increase balance/support.      Preeti Ford Good  Frequency (Obs): 5x/week  Number of Visits to Meet Established Goals: 5      CURRENT GOALS    Goal #1 Patient is able to demonstrate supine - sit EOB @ level: independent     Goal #2 Patient is able to demonstrate transfers Sit to/from Stand at [facility]

## 2018-02-21 NOTE — RESPIRATORY THERAPY NOTE
TASHA - Equipment Use Daily Summary:                  . Set Mode:AUTO CPAP WITH C-FLEX                . Usage in hours:6.5                . 90% Pressure (EPAP) level:14.5                . 90% Insp. Pressure (IPAP): Jessicavoyenye Manifold AHI:11.2                .  Ashly Mcadams

## 2018-02-21 NOTE — PROGRESS NOTES
S/P_ CABG. POD#1   Pt was extubated yesterday and doing well. Sitting up   alert and ooriented. Happy with care. No c/o any awareness. Teeth intact.     BP 90/77   Pulse 63   Temp 98 °F (36.7 °C) (Tympanic)   Resp (!) 0   Ht 1.575 m (5' 2\")   Wt 65 kg (1

## 2018-02-21 NOTE — OPERATIVE REPORT
[facility]    PATIENT'S NAME: Alexa Oreilly   ATTENDING PHYSICIAN: Karina Jaramillo MD   OPERATING PHYSICIAN: Karina Jaramillo MD   PATIENT ACCOUNT#:   [id number]    LOCATION:  [address] [place] [address]  MEDICAL RECORD #:   [id number]       YOB: 1945  ADMI saphenous graft was harvested endoscopically from the left leg. There was one good piece yielded, which was good for one bypass. Additionally, a segment of cryopreserved graft was then prepared according to protocol.   The patient was heparinized and tabatha the bypass grafts demonstrated reasonable systolic and diastolic flow in all the bypass grafts. The patient was decannulated. Protamine was administered. Pacing leads were applied to the heart. The chest was closed in the usual fashion.   The patient wa

## 2018-02-21 NOTE — PROGRESS NOTES
2000- received pt intubated. Drowsy but easily arousable. Pt does follow commands, nods appropriatly and moves all extremities. Vss. Insulin, dobs and ntg infusing as ordered. Will titrate accordingly.  Pt does nod \"yes\" to having pain but nod \"no\" to n

## 2018-02-21 NOTE — CONSULTS
[facility]  Report of Consultation    Sherine Sin Patient Status:  Inpatient    1945 MRN [id number]   [facility] 6NE-A Attending Alex Martinez MD   [facility] Day # 1 PCP Judit Wynn DO     Reason for Consultation:  hyperglycemia Comment: nemo VILLAFUERTE  : ANGIOPLASTY (CORONARY)      Comment: LAD,RCA  2014: ANGIOPLASTY (CORONARY)      Comment: Rt common fem arthroplasry, stent  5/12/15: ANGIOPLASTY (CORONARY)      Comment: right popliteal  No date:   No date: CATARACT  N Midazolam HCl (VERSED) 2 MG/2ML injection 1 mg, 1 mg, Intravenous, Q30 Min PRN  •  propofol (DIPRIVAN) infusion, 5-100 mcg/kg/min, Intravenous, Continuous  •  Dexmedetomidine HCl in NaCl (PRECEDEX) 400 MCG/100ML premix infusion, 0.2-1.5 mcg/kg/hr, Intraven gluconate 3 g in sodium chloride 0.9 % 100 mL IVPB, 3 g, Intravenous, PRN  •  Magnesium Sulfate in D5W IVPB premix 1 g, 1 g, Intravenous, PRN  •  Magnesium Sulfate IVPB premix SOLN 2 g, 2 g, Intravenous, PRN  •  Vancomycin HCl (VANCOCIN) 1,000 mg in sodium lesions noted  Psychiatric: nl affect  Neurologic: patellar reflexes 2+, no tremors     Labs:    Lab Results  Component Value Date   WBC 23.2 02/21/2018   HGB 11.0 02/21/2018   HCT 33.5 02/21/2018   .0 02/21/2018   CREATSERUM 1.29 02/21/2018   BUN 1

## 2018-02-22 PROCEDURE — 97110 THERAPEUTIC EXERCISES: CPT

## 2018-02-22 PROCEDURE — 80048 BASIC METABOLIC PNL TOTAL CA: CPT | Performed by: THORACIC SURGERY (CARDIOTHORACIC VASCULAR SURGERY)

## 2018-02-22 PROCEDURE — 97116 GAIT TRAINING THERAPY: CPT

## 2018-02-22 PROCEDURE — 97535 SELF CARE MNGMENT TRAINING: CPT

## 2018-02-22 PROCEDURE — 97165 OT EVAL LOW COMPLEX 30 MIN: CPT

## 2018-02-22 PROCEDURE — 97530 THERAPEUTIC ACTIVITIES: CPT

## 2018-02-22 PROCEDURE — 82962 GLUCOSE BLOOD TEST: CPT

## 2018-02-22 PROCEDURE — 94660 CPAP INITIATION&MGMT: CPT

## 2018-02-22 RX ORDER — DEXTROSE MONOHYDRATE 25 G/50ML
50 INJECTION, SOLUTION INTRAVENOUS
Status: DISCONTINUED | OUTPATIENT
Start: 2018-02-22 | End: 2018-02-22

## 2018-02-22 RX ORDER — ACETAMINOPHEN 500 MG
500 TABLET ORAL EVERY 4 HOURS PRN
Status: DISCONTINUED | OUTPATIENT
Start: 2018-02-22 | End: 2018-02-26

## 2018-02-22 RX ORDER — EZETIMIBE 10 MG/1
10 TABLET ORAL NIGHTLY
Status: DISCONTINUED | OUTPATIENT
Start: 2018-02-22 | End: 2018-02-26

## 2018-02-22 NOTE — PROGRESS NOTES
RECEIVED FROM CCU. ALERT AND OX3. NO SOB WITH 02 AT 3/NC-O2 SAT 96%. TELE SR WITH BEATRIS. PVCS. TEDS BOTH LEGS. HL RIGHT ARM. DENIES C/O PAIN. ROUTINE ADMISSION CARE RENDERED. FAMILY MEMBER ON BEDSIDE. ENCOURAGE IS.UP IN CHAIR WITH ALL MEALS. AMBULATE IN THE HALLWAY. Lyssa Rodriguez

## 2018-02-22 NOTE — PROGRESS NOTES
2000- received pt alert and orientated, vss. Pt states pain is acceptable at this time. No distress noted. Pt DTV aftter catheter removal, states has no urge to urinate, will continue to monitor.

## 2018-02-22 NOTE — PROGRESS NOTES
[facility]  Progress Note    Natasha Barger Patient Status:  Inpatient    1945 MRN [id number]   [facility] 6NE-A Attending Rafia Morley MD   Hosp Day # 2 PCP Malon Crigler, DO       Subjective:  No chest pain or shortness of breath. mg Oral Daily   • aspirin EC  325 mg Oral Daily   • metoprolol tartrate  25 mg Oral 2x Daily(Beta Blocker)   • docusate sodium  100 mg Oral BID   • mupirocin  1 Application Nasal BID     • sodium chloride 10 mL/hr at 02/20/18 1434   • DOBUTamine 2 mcg/kg/m

## 2018-02-22 NOTE — OCCUPATIONAL THERAPY NOTE
OCCUPATIONAL THERAPY EVALUATION - INPATIENT     Room Number: 0277/3260-G  Evaluation Date: 2/22/2018  Type of Evaluation: Initial  Presenting Problem: CABG    Physician Order: IP Consult to Occupational Therapy  Reason for Therapy: ADL/IADL Dysfunction and   No date: CATARACT  No date: CATH DRUG ELUTING STENT  No date: CATH PERCUTANEOUS  TRANSLUMINAL CORONARY ANGIO*      Comment: cardiac and peripheral  '95: CHOLECYSTECTOMY  abdominoplasty '98: OTHER SURGICAL HISTORY  breast reduction '98: OTHER SHARAN COORDINATION  Gross Motor    WFL    Fine Motor    WFL      ADDITIONAL TESTS                                    NEUROLOGICAL FINDINGS                   ACTIVITY TOLERANCE   Room air    ACTIVITIES OF DAILY LIVING ASSESSMENT  AM-PAC ‘6-Clicks’ Inpatient D noted above. Functional outcome measures completed include AMPAC, ROM. The AM-LANDRY ' '6-Clicks' Inpatient Daily Activity Short Form was completed and  this patient  is demonstrating a  38% degree impairment in activities of daily living.  Research supports t transfer from supine to sit:  with supervision  Patient will transfer from sit to stand:  with supervision  Patient will transfer to toilet:  with supervision  Pt will simulate tub transfer with supervision    UE Exercise Program Goal  Patient will be inde

## 2018-02-22 NOTE — PROGRESS NOTES
[facility]  Progress Note    Khari Barger Patient Status:  Inpatient    1945 MRN [id number]   [facility] 6NE-A Attending Gonsalo Tan MD   [facility] Day # 2 PCP Urmila Nguyen DO       SUBJECTIVE:  No acute events overnight.   No si 02/20/18  2307 02/21/18  0008 02/21/18  0114 02/21/18  6719 02/21/18  2370 02/21/18  0414 02/21/18  0508 02/21/18  4539 02/21/18  0656 02/21/18  0735 02/21/18  1147 02/21/18  1643 02/21/18  2042 02/22/18  0154   PGLU 152* 134* 126* 70 250* 244* 236* 224* 2 infusion 50mg in D5W 250ml 5-300 mcg/min Intravenous Continuous PRN   norepinephrine (LEVOPHED) 4 mg/250 ml premix infusion 0.5-30 mcg/min Intravenous Continuous PRN   aspirin EC EC tab 325 mg 325 mg Oral Daily   metoprolol Tartrate (LOPRESSOR) tab 25 mg 2

## 2018-02-22 NOTE — CM/SW NOTE
02/22/18 1400   CM/SW Referral Data   Referral Source Physician   Reason for Referral Discharge planning   Informant Patient;Spouse   Readmission Assessment   Factors that patient feels contributed to this readmission Other (only choose if nothing else

## 2018-02-22 NOTE — PROGRESS NOTES
[facility]  CV Surgery Progress Note    Emma Barger Patient Status:  Inpatient    1945 MRN [id number]   [facility] 6NE-A Attending Surinder Velásquez MD   [facility] Day # 2 PCP Sabi Rodrigues DO     Subjective:  Pt is feeling OK this mil[place]

## 2018-02-22 NOTE — PROGRESS NOTES
02/22/18 1039   Clinical Encounter Type   Visited With Patient   Sacramental Encounters   Sacrament of Sick-Anointing Visiting  anointed patient   The patient was seen by Gina Ellsworth.  Received prayer, Scripture, support and Sacrament

## 2018-02-22 NOTE — RESPIRATORY THERAPY NOTE
TASHA : EQUIPMENT USE: DAILY SUMMARY                                            SET MODE: AUTO CPAP WITH CFLEX                                          USAGE IN HOURS: 8:50                                          90

## 2018-02-22 NOTE — PHYSICAL THERAPY NOTE
PHYSICAL THERAPY TREATMENT NOTE - INPATIENT    Room Number: 3103/6931-X     Session: 1  Number of Visits to Meet Established Goals: 5    Presenting Problem: CABG s/p2/20/18    Problem List  Active Problems:    CAD (coronary artery disease)      Past Medic HISTORY  right fem pop bypass 2/10/15: OTHER SURGICAL HISTORY      Comment: Dr. Abhishek Mak  No date: REDUCTION LEFT Left  No date: REDUCTION RIGHT Right  6/23/10: REMV CATARACT EXTRACAP,INSERT LENS      Comment: Performed by Hiro Talley at [place] [address] Device: Rolling walker  Pattern: R Foot flat;L Foot flat (slight R lateral lean)  Stoop/Curb Assistance: Not tested       Skilled Therapy Provided: Pt received upright in bedside chair and agreeable to therapy.  Pt able to recall 3/3 sternal precautions and assist device: walker - rolling at assistance level: modified independent   Goal #4 Pt will recall 3/3 sternal precautions w/out cueing. - MET 2/22/18   Goal #5 Pt will be able to negotiate 1 flight of stairs mod I.    Goal #6     Goal Comments: Goals upda

## 2018-02-22 NOTE — PLAN OF CARE
Problem: CARDIOVASCULAR - ADULT  Goal: Maintains optimal cardiac output and hemodynamic stability  INTERVENTIONS:  - Monitor vital signs, rhythm, and trends  - Monitor for bleeding, hypotension and signs of decreased cardiac output  - Evaluate effectivenes function  - Promote sitting position while performing ADLs such as feeding, grooming, and bathing  - Educate and encourage patient/family in tolerated functional activity level and precautions during self-care    Outcome: Progressing

## 2018-02-23 PROBLEM — Z95.1 S/P CABG X 3: Status: ACTIVE | Noted: 2018-02-23

## 2018-02-23 PROCEDURE — 97535 SELF CARE MNGMENT TRAINING: CPT

## 2018-02-23 PROCEDURE — 97110 THERAPEUTIC EXERCISES: CPT

## 2018-02-23 PROCEDURE — 82962 GLUCOSE BLOOD TEST: CPT

## 2018-02-23 PROCEDURE — 97530 THERAPEUTIC ACTIVITIES: CPT

## 2018-02-23 PROCEDURE — 94660 CPAP INITIATION&MGMT: CPT

## 2018-02-23 PROCEDURE — 97116 GAIT TRAINING THERAPY: CPT

## 2018-02-23 RX ORDER — ASPIRIN 81 MG/1
81 TABLET ORAL DAILY
Status: DISCONTINUED | OUTPATIENT
Start: 2018-02-24 | End: 2018-02-26

## 2018-02-23 NOTE — PROGRESS NOTES
[facility]  Progress Note    Rose Barger Patient Status:  Inpatient    1945 MRN [id number]   [facility] 6NE-A Attending [place], MD   [address] Day # 3 PCP Fabi Ingram DO       SUBJECTIVE:  Feels well.  Eats a ketogenic diet 02/21/18  0008 02/21/18  0114 02/21/18  4376 02/21/18  8944 02/21/18  6429 02/21/18  9158 02/21/18  5268 02/21/18  0656 02/21/18  0735 02/21/18  1147 02/21/18  1643 02/21/18  2042 02/22/18  0154   PGLU 152* 134* 126* 70 250* 244* 236* 224* 238* 186* 172* 1 HEMOGLOBIN A1c Latest Ref Range: <5.7 % 6.5 (H)     Assessment and Plan:     1.  Controlled T2DM with hyperglycemia  - A1c 6.5%  - Diet controlled using ketogenic diet at home   - Doing well overall   - Continue novolog 1:20 > 140 if needed  - Will follow

## 2018-02-23 NOTE — OCCUPATIONAL THERAPY NOTE
OCCUPATIONAL THERAPY TREATMENT NOTE - INPATIENT     Room Number: 9283/8475-K  Session: 1  Number of Visits to Meet Established Goals: 3    Presenting Problem: CABG    History related to current admission: Pt is a 67 yr old female s/p CABG 2/20/18.  Pt has h PERCUTANEOUS  TRANSLUMINAL CORONARY ANGIO*      Comment: cardiac and peripheral  '95: CHOLECYSTECTOMY  abdominoplasty '98: OTHER SURGICAL HISTORY  breast reduction '98: OTHER SURGICAL HISTORY  right fem pop bypass 2/10/15: [facility] solved through scenarios related to how patient can return to daily life/skills without much interruption, utilizing these techniques.    Therapist completed assessment of current function related to mobility and ADL's.  Functional ambulation for approx 300 supervision  Pt will simulate tub transfer with supervision     UE Exercise Program Goal  Patient will be independent with bilateral AROM HEP (home exercise program).     Additional Goals:  Pt will return verbalize at least 3 energy conservation techniques

## 2018-02-23 NOTE — CM/SW NOTE
SW met with patient to [facility], she has decided on [facility]. Also provided pt with 3-in-1 commode info as recommended by OT. Pt's  will be home to assist patient after d/c. SW will continue to follow.      [facility]

## 2018-02-23 NOTE — RESPIRATORY THERAPY NOTE
TASHA : EQUIPMENT USE: DAILY SUMMARY                                            SET MODE: AUTO CPAP WITH CFLEX                                          USAGE IN HOURS: 11:20                                          9

## 2018-02-23 NOTE — PROGRESS NOTES
[facility]  CV Surgery Progress Note    Khari Barger Patient Status:  Inpatient    1945 MRN [id number]   [facility] 6NE-A Attending Gonsalo Tan MD   [facility] Day # 3 PCP Urmila Nguyen DO     Subjective:  Pt is feeling OK.  No complai

## 2018-02-23 NOTE — PROGRESS NOTES
CARDIOVASCULAR - ADULT     • Maintains optimal cardiac output and hemodynamic stability Progressing     • Absence of cardiac arrhythmias or at baseline Progressing           Impaired Activities of Daily Living     • Achieve highest/safest level of independ

## 2018-02-23 NOTE — PROGRESS NOTES
[facility]  Progress Note    Erik Barger Patient Status:  Inpatient    1945 MRN [id number]   [facility] 8NE-A Attending Gianni Brewer MD   Hosp Day # 3 PCP Mirlande Hassan DO     Subjective: mild incisional chest discomfort, no ang MG  2.6   --    GLU  127*  121*       Imaging:      Us Carotid Doppler Bilat - Diag Img (cpt=93880)    Result Date: 2/7/2018  PROCEDURE:  US CAROTID DOPPLER BILAT - DIAG IMG (CPT=93880)  COMPARISON:      CONCLUSION:  Less than 50% stenosis bilaterally.  Karuna Huynh 15 g Oral Q15 Min PRN   Or      Glucose-Vitamin C (DEX-4) 4-0.006 g chewable tab 4 tablet 4 tablet Oral Q15 Min PRN   Or      dextrose 50% injection 50 mL 50 mL Intravenous Q15 Min PRN   Or      glucose (DEX4) oral liquid 30 g 30 g Oral Q15 Min PRN   Or intravascularly  5. Hyperlipidemia with LDL-160 - allergy to statin- repatha outpatient. Zetia here. 6. HTN - controlled. 7. Anemia post op - stable - hgb 11  8. WBC 23 K  9. TASHA on CPAP  10.  History of severe peripheral arterial disease status post perc

## 2018-02-23 NOTE — PHYSICAL THERAPY NOTE
PHYSICAL THERAPY TREATMENT NOTE - INPATIENT    Room Number: 5540/9592-L     Session: 2  Number of Visits to Meet Established Goals: 5    Presenting Problem: CABG s/p2/20/18    Problem List  Principal Problem:    S/P CABG x 3  Active Problems:    CAD (kamron reduction '98: OTHER SURGICAL HISTORY  right fem pop bypass 2/10/15: OTHER SURGICAL HISTORY      Comment: Dr. Julia De  No date: REDUCTION LEFT Left  No date: REDUCTION RIGHT Right  6/23/10: REMV CATARACT EXTRACAP,INSERT LENS      Comment: Performed by Carleen Hernandez 150  Assistive Device: Rolling walker  Pattern: R Foot flat;L Foot flat (slight R lateral lean)  Stoop/Curb Assistance: Supervision (6 stairs with use of single railing)  Comment : above scores based on dept protocol    Skilled Therapy Provided:       Pt p Goal #1 Patient is able to demonstrate supine - sit EOB @ level: independent - met at Mod I    Goal #2 Patient is able to demonstrate transfers Sit to/from Stand at assistance level: modified independent - met 2/23/2018    Goal #3 Patient is able to ambu

## 2018-02-24 ENCOUNTER — APPOINTMENT (OUTPATIENT)
Dept: GENERAL RADIOLOGY | Facility: HOSPITAL | Age: 73
DRG: 236 | End: 2018-02-24
Attending: THORACIC SURGERY (CARDIOTHORACIC VASCULAR SURGERY)
Payer: MEDICARE

## 2018-02-24 PROCEDURE — 85025 COMPLETE CBC W/AUTO DIFF WBC: CPT | Performed by: INTERNAL MEDICINE

## 2018-02-24 PROCEDURE — 80048 BASIC METABOLIC PNL TOTAL CA: CPT | Performed by: INTERNAL MEDICINE

## 2018-02-24 PROCEDURE — 82962 GLUCOSE BLOOD TEST: CPT

## 2018-02-24 PROCEDURE — 71046 X-RAY EXAM CHEST 2 VIEWS: CPT | Performed by: THORACIC SURGERY (CARDIOTHORACIC VASCULAR SURGERY)

## 2018-02-24 RX ORDER — FUROSEMIDE 10 MG/ML
40 INJECTION INTRAMUSCULAR; INTRAVENOUS DAILY
Status: DISCONTINUED | OUTPATIENT
Start: 2018-02-24 | End: 2018-02-24

## 2018-02-24 RX ORDER — FUROSEMIDE 10 MG/ML
INJECTION INTRAMUSCULAR; INTRAVENOUS
Status: COMPLETED
Start: 2018-02-24 | End: 2018-02-24

## 2018-02-24 RX ORDER — FUROSEMIDE 10 MG/ML
40 INJECTION INTRAMUSCULAR; INTRAVENOUS
Status: DISCONTINUED | OUTPATIENT
Start: 2018-02-24 | End: 2018-02-26

## 2018-02-24 NOTE — PROGRESS NOTES
[facility]   [facility] Progress Note    Andra Amezquita. Patient Status:  Inpatient    1945 MRN [id number]   [facility] 8NE-A Attending Gwenette Curling, MD   [address] Day # 4 PCP Gi Baca DO     Subjective:    Mild pain.  Not able to get IS acetaminophen (TYLENOL EXTRA STRENGTH) tab 500 mg 500 mg Oral Q4H PRN   metoprolol Tartrate (LOPRESSOR) tab 25 mg 25 mg Oral TID Beta Blocker/Cardiac   ezetimibe (ZETIA) tab 10 mg 10 mg Oral Nightly   glucose (DEX4) oral liquid 15 g 15 g Oral Q15 Min PRN Incisions: Sternotomy C/D/I  LE C/D/I        Assessment/Plan:  Patient Active Problem List:     Coronary artery disease involving native heart without angina pectoris     Fibromyalgia     Peripheral vascular disease of lower extremity (HCC)     H/O perip

## 2018-02-24 NOTE — PLAN OF CARE
Received bedside report on this Pt. At 1515. Pt. Awake, A&Ox4, calm, pleasant and cooperative. SR on Tele monitor, sats greater than 92% on RA while awake, CPAP at night and during naps.  Pt. Ambulated in halls, tolerated fairly well, reported shortness of

## 2018-02-24 NOTE — PROGRESS NOTES
· [facility] Progress Note     Subjective:  Dyspneic today up in halls.   Just heading down to xray    Objective:  120/50  Afebrile  SB PVCs  I/O - 740     BUN/cr 36/0.86  Hgb 12  W 20K  plt 218    Cardiac:  S1 S2 regular  Lungs:  Few crackles

## 2018-02-24 NOTE — PROGRESS NOTES
[facility]  Progress Note    Rod Barger Patient Status:  Inpatient    1945 MRN [id number]   [facility] 6NE-A Attending Stefan Strong MD   [facility] Day # 4 PCP Deonna Payment, DO       SUBJECTIVE:  States she is very upset about ha 02/24/2018   CO2 23.0 02/24/2018    02/24/2018   CA 8.2 02/24/2018   PGLU 193 02/23/2018         Lab Results  Component Value Date   A1C 6.5 (H) 02/03/2018         Recent Labs   02/20/18  1324 02/20/18  1500 02/20/18  1622 02/20/18  1736 02/20/18  1 10 mL 10 mL Oral BID PRN   PEG 3350 (MIRALAX) powder packet 17 g 1 packet Oral Daily PRN   bisacodyl (DULCOLAX) rectal suppository 10 mg 10 mg Rectal Daily PRN   ondansetron HCl (ZOFRAN) injection 4 mg 4 mg Intravenous Q6H PRN   mupirocin (BACTROBAN) 2% na

## 2018-02-24 NOTE — RESPIRATORY THERAPY NOTE
TASHA - Equipment Use Daily Summary:                  . Set Mode:AUTO CPAP WITH C-FLEX                . Usage in hours: 11                . 90% Pressure (EPAP) level:6.5                . 90% Insp. Pressure (IPAP): Stephy Master AHI:2.6                .  Sup

## 2018-02-25 ENCOUNTER — PRIOR ORIGINAL RECORDS (OUTPATIENT)
Dept: OTHER | Age: 73
End: 2018-02-25

## 2018-02-25 ENCOUNTER — APPOINTMENT (OUTPATIENT)
Dept: ULTRASOUND IMAGING | Facility: HOSPITAL | Age: 73
DRG: 236 | End: 2018-02-25
Attending: THORACIC SURGERY (CARDIOTHORACIC VASCULAR SURGERY)
Payer: MEDICARE

## 2018-02-25 ENCOUNTER — APPOINTMENT (OUTPATIENT)
Dept: GENERAL RADIOLOGY | Facility: HOSPITAL | Age: 73
DRG: 236 | End: 2018-02-25
Attending: RADIOLOGY
Payer: MEDICARE

## 2018-02-25 PROCEDURE — 80048 BASIC METABOLIC PNL TOTAL CA: CPT | Performed by: NURSE PRACTITIONER

## 2018-02-25 PROCEDURE — 32555 ASPIRATE PLEURA W/ IMAGING: CPT | Performed by: THORACIC SURGERY (CARDIOTHORACIC VASCULAR SURGERY)

## 2018-02-25 PROCEDURE — 71045 X-RAY EXAM CHEST 1 VIEW: CPT | Performed by: RADIOLOGY

## 2018-02-25 PROCEDURE — 82962 GLUCOSE BLOOD TEST: CPT

## 2018-02-25 PROCEDURE — 85025 COMPLETE CBC W/AUTO DIFF WBC: CPT | Performed by: NURSE PRACTITIONER

## 2018-02-25 PROCEDURE — 0W9B3ZZ DRAINAGE OF LEFT PLEURAL CAVITY, PERCUTANEOUS APPROACH: ICD-10-PCS | Performed by: RADIOLOGY

## 2018-02-25 NOTE — PLAN OF CARE
CARDIOVASCULAR - ADULT    • Maintains optimal cardiac output and hemodynamic stability Progressing    • Absence of cardiac arrhythmias or at baseline  Tele showing SB. Hr - 50's.  Denies complaints of chest pain, sob or dizziness at rest.  Progressing

## 2018-02-25 NOTE — PROGRESS NOTES
[facility]  Progress Note    Emma Barger Patient Status:  Inpatient    1945 MRN [id number]   [facility] 6NE-A Attending Surinder Velásquez MD   [facility] Day # 5 PCP Ruma Orozco DO       SUBJECTIVE:  Struggling with spirometry.  L ple PGLU 139 02/25/2018         Lab Results  Component Value Date   A1C 6.5 (H) 02/03/2018         Recent Labs   02/20/18  1324 02/20/18  1500 02/20/18  1622 02/20/18  1736 02/20/18  1758 02/20/18  1822 02/20/18  1904 02/20/18  2000 02/20/18  2104 02/20/18 10 mL Oral BID PRN   PEG 3350 (MIRALAX) powder packet 17 g 1 packet Oral Daily PRN   bisacodyl (DULCOLAX) rectal suppository 10 mg 10 mg Rectal Daily PRN   ondansetron HCl (ZOFRAN) injection 4 mg 4 mg Intravenous Q6H PRN        Ref.  Range 2/3/2018 04:41

## 2018-02-25 NOTE — PROGRESS NOTES
[facility] Progress Note    Andra Ar Amezquita. Patient Status:  Inpatient    1945 MRN [id number]   [facility] 8NE-A Attending Sonja Benitez MD   [facility] Day # 5 PCP Summer Wheeler DO     Subjective:    Breathing feels same , sob wit (NOVOLOG) 100 UNIT/ML flexpen 2-10 Units 2-10 Units Subcutaneous TID CC and HS   acetaminophen (TYLENOL EXTRA STRENGTH) tab 500 mg 500 mg Oral Q4H PRN   ezetimibe (ZETIA) tab 10 mg 10 mg Oral Nightly   glucose (DEX4) oral liquid 15 g 15 g Oral Q15 Min PRN Coronary artery disease involving native heart without angina pectoris     Fibromyalgia     Peripheral vascular disease of lower extremity (HCC)     H/O peripheral artery bypass     At risk for falling     Claudication [facility])     Coronary artery disease inv

## 2018-02-25 NOTE — PROGRESS NOTES
· [facility] Progress Note     Subjective:  Up in chair -  Mild cough, no dyspnea, feels like can't get IS up    Objective:  149/58  Afebrile   SR  I/O -570    BUN/cr 39/0.77  Hgb 11.9  W 15K  (down from 20K)    Cardiac:  S1 S2 regular  Lungs:

## 2018-02-25 NOTE — PROCEDURES
[facility]  Procedure Note    Andra Joyner [address] Patient Status:  Inpatient    1945 MRN [id number]   [facility] 8NE-A Attending Rosalva Salas MD   [facility] Day # 5 PCP Yamini Larkin DO     Procedure: Left thoracentesis    Pre-Procedure Diag

## 2018-02-25 NOTE — RESPIRATORY THERAPY NOTE
TASHA - Equipment Use Daily Summary:                  . Set Mode:AUTO CPAP WITH C-FLEX                . Usage in hours:13                . 90% Pressure (EPAP) level:6.0                . 90% Insp. Pressure (IPAP): Niko Lovett AHI:1.3                .  Supp

## 2018-02-26 ENCOUNTER — APPOINTMENT (OUTPATIENT)
Dept: GENERAL RADIOLOGY | Facility: HOSPITAL | Age: 73
DRG: 236 | End: 2018-02-26
Attending: RADIOLOGY
Payer: MEDICARE

## 2018-02-26 VITALS
RESPIRATION RATE: 18 BRPM | DIASTOLIC BLOOD PRESSURE: 50 MMHG | WEIGHT: 153 LBS | HEART RATE: 76 BPM | BODY MASS INDEX: 28.16 KG/M2 | OXYGEN SATURATION: 90 % | TEMPERATURE: 98 F | HEIGHT: 62 IN | SYSTOLIC BLOOD PRESSURE: 136 MMHG

## 2018-02-26 PROCEDURE — 82962 GLUCOSE BLOOD TEST: CPT

## 2018-02-26 PROCEDURE — 80048 BASIC METABOLIC PNL TOTAL CA: CPT | Performed by: NURSE PRACTITIONER

## 2018-02-26 PROCEDURE — 71046 X-RAY EXAM CHEST 2 VIEWS: CPT | Performed by: RADIOLOGY

## 2018-02-26 RX ORDER — EZETIMIBE 10 MG/1
10 TABLET ORAL NIGHTLY
Qty: 30 TABLET | Refills: 5 | Status: SHIPPED | OUTPATIENT
Start: 2018-02-26 | End: 2019-03-20 | Stop reason: ALTCHOICE

## 2018-02-26 RX ORDER — ASPIRIN 81 MG/1
81 TABLET ORAL DAILY
Qty: 30 TABLET | Refills: 11 | Status: SHIPPED | OUTPATIENT
Start: 2018-02-27 | End: 2018-02-26

## 2018-02-26 RX ORDER — METOPROLOL SUCCINATE 25 MG/1
25 TABLET, EXTENDED RELEASE ORAL DAILY
Qty: 30 TABLET | Refills: 5 | Status: SHIPPED | OUTPATIENT
Start: 2018-02-26 | End: 2018-02-26

## 2018-02-26 RX ORDER — METOPROLOL SUCCINATE 25 MG/1
25 TABLET, EXTENDED RELEASE ORAL DAILY
Qty: 30 TABLET | Refills: 5 | Status: SHIPPED | OUTPATIENT
Start: 2018-02-26 | End: 2019-03-20 | Stop reason: DRUGHIGH

## 2018-02-26 RX ORDER — ASPIRIN 81 MG/1
81 TABLET ORAL DAILY
Qty: 30 TABLET | Refills: 11 | Status: SHIPPED | OUTPATIENT
Start: 2018-02-27 | End: 2018-10-15

## 2018-02-26 RX ORDER — EZETIMIBE 10 MG/1
10 TABLET ORAL NIGHTLY
Qty: 30 TABLET | Refills: 5 | Status: SHIPPED | OUTPATIENT
Start: 2018-02-26 | End: 2018-02-26

## 2018-02-26 RX ORDER — CLOPIDOGREL BISULFATE 75 MG/1
75 TABLET ORAL DAILY
Qty: 30 TABLET | Refills: 11 | Status: ON HOLD | OUTPATIENT
Start: 2018-02-27 | End: 2021-01-01

## 2018-02-26 RX ORDER — CLOPIDOGREL BISULFATE 75 MG/1
75 TABLET ORAL DAILY
Qty: 30 TABLET | Refills: 11 | Status: SHIPPED | OUTPATIENT
Start: 2018-02-27 | End: 2018-02-26

## 2018-02-26 NOTE — PLAN OF CARE
Assumed care of patient around 0730. POD 6 CABG, s/p thoracentesis yesterday. Pt a/o x 4, RA, NSR on tele monitor. Lungs diminished/clear bilaterally. Pt denies chest pain/discomfort, SOB at present. Ambulated in hallway this a.m., w/o difficulty.  Denies D

## 2018-02-26 NOTE — PLAN OF CARE
NURSING DISCHARGE NOTE    Discharged Home via Wheelchair. Accompanied by Family member and Support staff  Belongings Taken by patient/family. IV removed, catheter intact. New prescriptions given and discussed with pt and spouse.    Post open heart

## 2018-02-26 NOTE — PROGRESS NOTES
[facility]  Cardiology Progress Note    Emma Barger Patient Status:  Inpatient    1945 MRN [id number]   [facility] 8NE-A Attending Surinder Velásquez MD   [address] Day # 6 PCP Sabi Rodrigues DO     Subjective:  Feels much better after thor Acidophilus/Pectin  1 capsule Oral Daily   • famoTIDine  20 mg Oral Daily   • docusate sodium  100 mg Oral BID         Assessment:  · CAD s/p CABG 2/20/18, EF 60%  On Plavix per Dr. Earlene Carrington  · Large left pleural effusion, s/p 1.4 liter thoracentesis 2/25/18  ·

## 2018-02-26 NOTE — PROGRESS NOTES
02/26/18 3853   Clinical Encounter Type   Continue Visiting ( to remain available at pager [phone], as needed/requested. )

## 2018-02-26 NOTE — PROGRESS NOTES
[facility]  Progress Note    Nicolas Barger Patient Status:  Inpatient    1945 MRN [id number]   [facility] 6NE-A Attending Sonja Benitez MD   [facility] Day # 6 PCP Summer Wheeler DO       SUBJECTIVE:  S/p L thoracentesis yesterday.   Jaimie Walls 02/20/18  1500 02/20/18  1622 02/20/18  1736 02/20/18  1758 02/20/18  1822 02/20/18  1904 02/20/18  2000 02/20/18  2104 02/20/18  2219 02/20/18  2307 02/21/18  0008 02/21/18  0114 02/21/18  0212 02/21/18  1170 02/21/18  0414 02/21/18  0508 02/21/18  0611 0 10 mg 10 mg Rectal Daily PRN   ondansetron HCl (ZOFRAN) injection 4 mg 4 mg Intravenous Q6H PRN        Ref. Range 2/3/2018 04:41   HEMOGLOBIN A1c Latest Ref Range: <5.7 % 6.5 (H)      Ref.  Range 2/25/2018 07:37 2/25/2018 11:39 2/25/2018 16:28 2/25/2018 20:

## 2018-02-26 NOTE — PROGRESS NOTES
POD #6 CABG  cxr reviewed, much improved after thora yesterday, ambulated without difficulty today, denies sob, c/o mild discomfort at thora site. D/w dedrick Khan to d/c home today. Will repeat cxr next week to eval effusion.   D/w patient, instr

## 2018-02-26 NOTE — RESPIRATORY THERAPY NOTE
TASHA - Equipment Use Daily Summary:                  . Set Mode: AUTO CPAP WITH C-FLEX                . Usage in hours: 7.6                . 90% Pressure (EPAP) level: 8.5                . 90% Insp. Pressure (IPAP): Daniel Fortune AHI: 2.4                .

## 2018-02-26 NOTE — CM/SW NOTE
YUMIKO notified Moe Bagley at [facility] of d/c, avs sent.   P: [id number]  F: [phone]

## 2018-02-26 NOTE — PLAN OF CARE
CARDIOVASCULAR - ADULT    • Maintains optimal cardiac output and hemodynamic stability Completed    • Absence of cardiac arrhythmias or at baseline Completed        Impaired Activities of Daily Living    • Achieve highest/safest level of independence in se

## 2018-02-27 ENCOUNTER — PATIENT OUTREACH (OUTPATIENT)
Dept: CASE MANAGEMENT | Age: 73
End: 2018-02-27

## 2018-02-27 DIAGNOSIS — Z02.9 ENCOUNTERS FOR ADMINISTRATIVE PURPOSE: ICD-10-CM

## 2018-02-27 RX ORDER — ACETAMINOPHEN 500 MG
500 TABLET ORAL EVERY 6 HOURS PRN
Status: ON HOLD | COMMUNITY
End: 2021-01-01

## 2018-02-27 NOTE — PROGRESS NOTES
Initial Post Discharge Follow Up   Discharge Date: 2/26/18  Contact Date: 2/27/2018    Consent Verification:  Assessment Completed With: Patient  HIPAA Verified?   Yes    Discharge Dx:   Coronary artery disease, s/p CABG X3 by Dr. Leyla Smith:   Rafael Officer Reviewed medication list with the patient. Medications are up to date. Pt stated she does not recall a list of medications to continue. Reviewed all recommended \"continued\" medications.  Pt stated she did not restart HCTZ 12.5mg daily because it was not o your discharge medications when you left the hospital? Yes  • May I go over your medications with you to make sure we are not missing anything? yes  • Are there any reasons that keep you from taking your medication as prescribed?  No  Are you having any conc Your appointments     Date & Time Appointment Department [facility])    Feb 28, 2018 10:45 AM CST XR CHEST PA + LAT with [facility] XR RM6 (CHEST) [facility] Xray Department [facility])    Feb 28, 2018 11:00 AM CST XR CHEST [facility] [address]  [address] [id number]  [address] [place] [address]  [phone]          PCP TCM/HFU appointment: scheduled at D/C within 7-14 days  ye

## 2018-02-27 NOTE — CM/SW NOTE
02/27/18 1000   Discharge disposition   Discharged to: Home-Health   Name of Pallavi Robison 68   Discharge transportation Private car

## 2018-02-28 ENCOUNTER — HOSPITAL ENCOUNTER (OUTPATIENT)
Dept: GENERAL RADIOLOGY | Facility: HOSPITAL | Age: 73
Discharge: HOME | End: 2018-02-28
Attending: THORACIC SURGERY (CARDIOTHORACIC VASCULAR SURGERY)
Payer: MEDICARE

## 2018-02-28 ENCOUNTER — HOSPITAL ENCOUNTER (OUTPATIENT)
Dept: GENERAL RADIOLOGY | Facility: HOSPITAL | Age: 73
End: 2018-02-28
Attending: THORACIC SURGERY (CARDIOTHORACIC VASCULAR SURGERY)
Payer: MEDICARE

## 2018-02-28 DIAGNOSIS — Z98.890 HISTORY OF OPEN HEART SURGERY: ICD-10-CM

## 2018-02-28 PROCEDURE — 71048 X-RAY EXAM CHEST 4+ VIEWS: CPT | Performed by: THORACIC SURGERY (CARDIOTHORACIC VASCULAR SURGERY)

## 2018-03-01 ENCOUNTER — TELEPHONE (OUTPATIENT)
Dept: CARDIOLOGY UNIT | Facility: HOSPITAL | Age: 73
End: 2018-03-01

## 2018-03-01 NOTE — PROGRESS NOTES
Called pt to discuss cxr, no effusion noted, will repeat one more time next week to ensure no reaccumulation. D/w patient, she understands and agrees.   Luis Garcia RN  Clinical Coordinator  CV Surgery

## 2018-03-05 ENCOUNTER — OFFICE VISIT (OUTPATIENT)
Dept: FAMILY MEDICINE CLINIC | Facility: CLINIC | Age: 73
End: 2018-03-05

## 2018-03-05 VITALS
WEIGHT: 149 LBS | RESPIRATION RATE: 22 BRPM | SYSTOLIC BLOOD PRESSURE: 134 MMHG | HEART RATE: 67 BPM | TEMPERATURE: 98 F | HEIGHT: 62 IN | OXYGEN SATURATION: 98 % | BODY MASS INDEX: 27.42 KG/M2 | DIASTOLIC BLOOD PRESSURE: 60 MMHG

## 2018-03-05 DIAGNOSIS — E11.8 TYPE 2 DIABETES MELLITUS WITH COMPLICATION, WITH LONG-TERM CURRENT USE OF INSULIN (HCC): ICD-10-CM

## 2018-03-05 DIAGNOSIS — E08.42 DIABETIC POLYNEUROPATHY ASSOCIATED WITH DIABETES MELLITUS DUE TO UNDERLYING CONDITION (HCC): ICD-10-CM

## 2018-03-05 DIAGNOSIS — I73.9 PERIPHERAL VASCULAR DISEASE OF LOWER EXTREMITY (HCC): ICD-10-CM

## 2018-03-05 DIAGNOSIS — E11.3292 MILD NONPROLIFERATIVE DIABETIC RETINOPATHY OF LEFT EYE WITHOUT MACULAR EDEMA ASSOCIATED WITH TYPE 2 DIABETES MELLITUS (HCC): ICD-10-CM

## 2018-03-05 DIAGNOSIS — I10 BENIGN ESSENTIAL HTN: ICD-10-CM

## 2018-03-05 DIAGNOSIS — I25.810 CORONARY ARTERY DISEASE INVOLVING CORONARY BYPASS GRAFT OF NATIVE HEART WITHOUT ANGINA PECTORIS: Primary | ICD-10-CM

## 2018-03-05 DIAGNOSIS — Z98.890 H/O PERIPHERAL ARTERY BYPASS: ICD-10-CM

## 2018-03-05 DIAGNOSIS — Z79.4 TYPE 2 DIABETES MELLITUS WITH COMPLICATION, WITH LONG-TERM CURRENT USE OF INSULIN (HCC): ICD-10-CM

## 2018-03-05 DIAGNOSIS — E08.51 DIABETES MELLITUS DUE TO UNDERLYING CONDITION WITH DIABETIC PERIPHERAL ANGIOPATHY WITHOUT GANGRENE, WITH LONG-TERM CURRENT USE OF INSULIN (HCC): ICD-10-CM

## 2018-03-05 DIAGNOSIS — Z79.4 DIABETES MELLITUS DUE TO UNDERLYING CONDITION WITH DIABETIC PERIPHERAL ANGIOPATHY WITHOUT GANGRENE, WITH LONG-TERM CURRENT USE OF INSULIN (HCC): ICD-10-CM

## 2018-03-05 DIAGNOSIS — Z95.1 S/P CABG X 3: ICD-10-CM

## 2018-03-05 DIAGNOSIS — J44.9 CHRONIC OBSTRUCTIVE PULMONARY DISEASE, UNSPECIFIED COPD TYPE (HCC): ICD-10-CM

## 2018-03-05 PROCEDURE — 99215 OFFICE O/P EST HI 40 MIN: CPT | Performed by: FAMILY MEDICINE

## 2018-03-05 NOTE — PROGRESS NOTES
HPI:    Diony Beal is a 67year old female here today for hospital follow up.    She was discharged from Inpatient hospital, [facility] to Home   Admission Date: 2/20/18   Discharge Date: 2/26/18  Hospital Discharge Diagnoses (since 2/3/2018)     s Metoprolol Succinate ER 25 MG Oral Tablet 24 Hr Take 1 tablet (25 mg total) by mouth daily. Clopidogrel Bisulfate 75 MG Oral Tab Take 1 tablet (75 mg total) by mouth daily.    hydrochlorothiazide 12.5 MG Oral Cap Take 1 capsule (12.5 mg total) by mouth angioplasty (coronary) (2014); angioplasty (coronary) (); angioplasty (coronary) (2014); angioplasty (coronary) (5/12/15); cath drug eluting stent; tonsillectomy; ; cataract; ablation; and cath percutaneous  transluminal coronary angioplas not tender, FROM of the extremities  EXTREMITIES: no cyanosis, clubbing or edema  Bilateral barefoot skin diabetic exam is normal, visualized feet and the appearance is normal.  Bilateral monofilament/sensation of both feet is abnormal .  Pulsation pedal p are true:  Communication with the patient was made within 2 business days of discharge on date above   Medical Decision Making- Based on service period of discharge to 30 days:   · Number of Possible Diagnoses and/or Management Options: severe  · Amount an

## 2018-03-07 ENCOUNTER — APPOINTMENT (OUTPATIENT)
Dept: GENERAL RADIOLOGY | Facility: HOSPITAL | Age: 73
End: 2018-03-07
Attending: THORACIC SURGERY (CARDIOTHORACIC VASCULAR SURGERY)
Payer: MEDICARE

## 2018-03-07 ENCOUNTER — MYAURORA ACCOUNT LINK (OUTPATIENT)
Dept: OTHER | Age: 73
End: 2018-03-07

## 2018-03-07 ENCOUNTER — HOSPITAL ENCOUNTER (OUTPATIENT)
Dept: GENERAL RADIOLOGY | Facility: HOSPITAL | Age: 73
Discharge: HOME | End: 2018-03-07
Attending: THORACIC SURGERY (CARDIOTHORACIC VASCULAR SURGERY)
Payer: MEDICARE

## 2018-03-07 ENCOUNTER — PRIOR ORIGINAL RECORDS (OUTPATIENT)
Dept: OTHER | Age: 73
End: 2018-03-07

## 2018-03-07 DIAGNOSIS — Z98.890 HISTORY OF OPEN HEART SURGERY: ICD-10-CM

## 2018-03-07 PROCEDURE — 71048 X-RAY EXAM CHEST 4+ VIEWS: CPT | Performed by: THORACIC SURGERY (CARDIOTHORACIC VASCULAR SURGERY)

## 2018-03-08 ENCOUNTER — TELEPHONE (OUTPATIENT)
Dept: CARDIOLOGY UNIT | Facility: HOSPITAL | Age: 73
End: 2018-03-08

## 2018-03-08 NOTE — PROGRESS NOTES
Reviewed cxr with Jasmin Snyder, small effusion on right, pt denies resp symptoms aside from trouble with IS. Per Jasmin Snyder will repeat cxr prior to follow up on 3/21 unless she becomes symptomatic. D/w patient, she understands and agrees.   Prince Kerns

## 2018-03-09 NOTE — DISCHARGE SUMMARY
[facility]  Discharge Summary    Andra Amezquita. Patient Status:  Inpatient    1945 MRN [id number]   [facility] 8NE-A Attending No att. providers found   [facility] Day # 6 PCP Jonas Jacobsen DO     Admit date: 2018    Discharge date a

## 2018-03-12 LAB
BUN: 39 MG/DL
CALCIUM: 8.3 MG/DL
CHLORIDE: 104 MEQ/L
CREATININE, SERUM: 0.66 MG/DL
GLUCOSE: 140 MG/DL
HEMATOCRIT: 36.5 %
HEMOGLOBIN: 11.9 G/DL
PLATELETS: 250 K/UL
POTASSIUM, SERUM: 4 MEQ/L
RED BLOOD COUNT: 3.97 X 10-6/U
SODIUM: 138 MEQ/L
WHITE BLOOD COUNT: 15 X 10-3/U

## 2018-03-19 ENCOUNTER — HOSPITAL ENCOUNTER (OUTPATIENT)
Dept: GENERAL RADIOLOGY | Facility: HOSPITAL | Age: 73
Discharge: HOME | End: 2018-03-19
Attending: THORACIC SURGERY (CARDIOTHORACIC VASCULAR SURGERY)
Payer: MEDICARE

## 2018-03-19 DIAGNOSIS — J90 PLEURAL EFFUSION: ICD-10-CM

## 2018-03-19 PROCEDURE — 71048 X-RAY EXAM CHEST 4+ VIEWS: CPT | Performed by: THORACIC SURGERY (CARDIOTHORACIC VASCULAR SURGERY)

## 2018-03-22 NOTE — IMAGING NOTE
Patient verbaliZed understanding to instructions given: NPO Nx 6 hours, arrive 1 hour early,  to take patient home and recovery x 4 hours.

## 2018-03-23 ENCOUNTER — HOSPITAL ENCOUNTER (OUTPATIENT)
Dept: GENERAL RADIOLOGY | Facility: HOSPITAL | Age: 73
Discharge: HOME | End: 2018-03-23
Attending: RADIOLOGY
Payer: MEDICARE

## 2018-03-23 ENCOUNTER — APPOINTMENT (OUTPATIENT)
Dept: LAB | Facility: HOSPITAL | Age: 73
End: 2018-03-23
Attending: THORACIC SURGERY (CARDIOTHORACIC VASCULAR SURGERY)
Payer: MEDICARE

## 2018-03-23 ENCOUNTER — HOSPITAL ENCOUNTER (OUTPATIENT)
Dept: ULTRASOUND IMAGING | Facility: HOSPITAL | Age: 73
Discharge: HOME | End: 2018-03-23
Attending: THORACIC SURGERY (CARDIOTHORACIC VASCULAR SURGERY)
Payer: MEDICARE

## 2018-03-23 VITALS
HEART RATE: 62 BPM | WEIGHT: 149 LBS | TEMPERATURE: 99 F | BODY MASS INDEX: 27.42 KG/M2 | SYSTOLIC BLOOD PRESSURE: 153 MMHG | DIASTOLIC BLOOD PRESSURE: 57 MMHG | OXYGEN SATURATION: 95 % | RESPIRATION RATE: 16 BRPM | HEIGHT: 62 IN

## 2018-03-23 DIAGNOSIS — J90 PLEURAL EFFUSION: Primary | ICD-10-CM

## 2018-03-23 DIAGNOSIS — J90 PLEURAL EFFUSION: ICD-10-CM

## 2018-03-23 LAB
INR BLD: 1.09 (ref 0.9–1.1)
PSA SERPL DL<=0.01 NG/ML-MCNC: 14.5 SECONDS (ref 12.4–14.7)

## 2018-03-23 PROCEDURE — 71045 X-RAY EXAM CHEST 1 VIEW: CPT | Performed by: RADIOLOGY

## 2018-03-23 PROCEDURE — 36415 COLL VENOUS BLD VENIPUNCTURE: CPT

## 2018-03-23 PROCEDURE — 32555 ASPIRATE PLEURA W/ IMAGING: CPT | Performed by: THORACIC SURGERY (CARDIOTHORACIC VASCULAR SURGERY)

## 2018-03-23 PROCEDURE — 85610 PROTHROMBIN TIME: CPT

## 2018-03-23 NOTE — PROCEDURES
[facility]  Procedure Note    Andra PALOMINO 50. Patient Status:  Outpatient    1945 MRN [id number]   Location [address] [place] [address] Attending Braden Morales MD   Hosp Day # 0 PCP Yamini Arrieta DO     Procedure: Left thoracentesis    Pre-Procedur

## 2018-03-23 NOTE — OR NURSING
Dr. Kinza Galan called and updated on patient condition. He states he has reviewed patients post-thoracentesis xray. Made aware patient is doing well, denies any SOB or difficulty breathing.  States procedure site is sore, and feels pain when she takes a deep

## 2018-03-23 NOTE — IMAGING NOTE
Pt in room US room 4 for thoracentesis with , pt had 0.8l removed.   Report called to same day and pt transported

## 2018-03-26 ENCOUNTER — HOSPITAL ENCOUNTER (OUTPATIENT)
Dept: GENERAL RADIOLOGY | Facility: HOSPITAL | Age: 73
Discharge: HOME | End: 2018-03-26
Attending: RADIOLOGY
Payer: MEDICARE

## 2018-03-26 ENCOUNTER — APPOINTMENT (OUTPATIENT)
Dept: LAB | Facility: HOSPITAL | Age: 73
End: 2018-03-26
Attending: THORACIC SURGERY (CARDIOTHORACIC VASCULAR SURGERY)
Payer: MEDICARE

## 2018-03-26 ENCOUNTER — HOSPITAL ENCOUNTER (OUTPATIENT)
Dept: ULTRASOUND IMAGING | Facility: HOSPITAL | Age: 73
Discharge: HOME | End: 2018-03-26
Attending: THORACIC SURGERY (CARDIOTHORACIC VASCULAR SURGERY)
Payer: MEDICARE

## 2018-03-26 VITALS
HEIGHT: 62 IN | TEMPERATURE: 98 F | WEIGHT: 149 LBS | HEART RATE: 63 BPM | RESPIRATION RATE: 16 BRPM | SYSTOLIC BLOOD PRESSURE: 155 MMHG | DIASTOLIC BLOOD PRESSURE: 62 MMHG | BODY MASS INDEX: 27.42 KG/M2 | OXYGEN SATURATION: 96 %

## 2018-03-26 DIAGNOSIS — J90 RECURRENT PLEURAL EFFUSION ON RIGHT: ICD-10-CM

## 2018-03-26 DIAGNOSIS — Z95.1 S/P CABG X 3: ICD-10-CM

## 2018-03-26 DIAGNOSIS — Z95.1 S/P CABG X 3: Primary | ICD-10-CM

## 2018-03-26 LAB
BASOPHIL PLEURAL FLUID: 0 %
EOSINOPHIL PLEURAL FLUID: 2 %
INR BLD: 1.02 (ref 0.9–1.1)
LYMPHOCYTE PLEURAL FLUID: 84 %
MESOTHELIAL PLEURAL FLUID: 4 %
MONO/MAC/HISTIO PLEURAL FLUID: 5 %
NEUTROPHIL PLEURAL FLUID: 5 %
PSA SERPL DL<=0.01 NG/ML-MCNC: 13.8 SECONDS (ref 12.4–14.7)
RBC PLEURAL FLUID: <3000 /MM3
TOTAL CELLS COUNTED: 100
TRIGLYCERIDE PLEURAL FLUID: 64 MG/DL
WBC PLEURAL FLUID: 791 /MM3

## 2018-03-26 PROCEDURE — 88108 CYTOPATH CONCENTRATE TECH: CPT | Performed by: THORACIC SURGERY (CARDIOTHORACIC VASCULAR SURGERY)

## 2018-03-26 PROCEDURE — 88305 TISSUE EXAM BY PATHOLOGIST: CPT | Performed by: THORACIC SURGERY (CARDIOTHORACIC VASCULAR SURGERY)

## 2018-03-26 PROCEDURE — 89050 BODY FLUID CELL COUNT: CPT | Performed by: THORACIC SURGERY (CARDIOTHORACIC VASCULAR SURGERY)

## 2018-03-26 PROCEDURE — 71045 X-RAY EXAM CHEST 1 VIEW: CPT | Performed by: RADIOLOGY

## 2018-03-26 PROCEDURE — 32555 ASPIRATE PLEURA W/ IMAGING: CPT | Performed by: THORACIC SURGERY (CARDIOTHORACIC VASCULAR SURGERY)

## 2018-03-26 PROCEDURE — 36415 COLL VENOUS BLD VENIPUNCTURE: CPT

## 2018-03-26 PROCEDURE — 87070 CULTURE OTHR SPECIMN AEROBIC: CPT | Performed by: THORACIC SURGERY (CARDIOTHORACIC VASCULAR SURGERY)

## 2018-03-26 PROCEDURE — 85610 PROTHROMBIN TIME: CPT

## 2018-03-26 PROCEDURE — 87205 SMEAR GRAM STAIN: CPT | Performed by: THORACIC SURGERY (CARDIOTHORACIC VASCULAR SURGERY)

## 2018-03-26 PROCEDURE — 84478 ASSAY OF TRIGLYCERIDES: CPT | Performed by: THORACIC SURGERY (CARDIOTHORACIC VASCULAR SURGERY)

## 2018-03-26 PROCEDURE — 89051 BODY FLUID CELL COUNT: CPT | Performed by: THORACIC SURGERY (CARDIOTHORACIC VASCULAR SURGERY)

## 2018-03-26 RX ORDER — ACETAMINOPHEN 325 MG/1
650 TABLET ORAL EVERY 6 HOURS PRN
Status: DISCONTINUED | OUTPATIENT
Start: 2018-03-26 | End: 2018-03-28

## 2018-03-26 NOTE — PROCEDURES
[facility]  Procedure Note    Andra Ar PALOMINO 50. Patient Status:  Outpatient    1945 MRN [id number]   Location [address] [place] [address] Attending Kasandra Garcia MD   Hosp Day # 0 PCP Laura Mckinney DO     Procedure: US guided right thoracentesis    P

## 2018-03-26 NOTE — IMAGING NOTE
Right sided thora completed and 1 liter removed from right lung. Report called to Guanako Luis in same day and patient will be transferred to room 2246.

## 2018-03-27 LAB — NON GYNE INTERPRETATION: NEGATIVE

## 2018-03-30 ENCOUNTER — PRIOR ORIGINAL RECORDS (OUTPATIENT)
Dept: OTHER | Age: 73
End: 2018-03-30

## 2018-04-02 ENCOUNTER — TELEPHONE (OUTPATIENT)
Dept: CARDIOLOGY UNIT | Facility: HOSPITAL | Age: 73
End: 2018-04-02

## 2018-04-02 NOTE — PROGRESS NOTES
Pt called asking about labs from pleural fluid, all questions answered. Instructed to call if s/s return, if so will repeat cxr.   Isak Alberts RN  Clinical Coordinator  CV Surgery

## 2018-04-03 ENCOUNTER — TELEPHONE (OUTPATIENT)
Dept: FAMILY MEDICINE CLINIC | Facility: CLINIC | Age: 73
End: 2018-04-03

## 2018-04-09 ENCOUNTER — HOSPITAL ENCOUNTER (OUTPATIENT)
Dept: CV DIAGNOSTICS | Facility: HOSPITAL | Age: 73
Discharge: HOME | End: 2018-04-09
Attending: INTERNAL MEDICINE
Payer: MEDICARE

## 2018-04-09 DIAGNOSIS — Z01.89 ENCOUNTER FOR REHABILITATION EVALUATION: ICD-10-CM

## 2018-04-09 PROCEDURE — 93017 CV STRESS TEST TRACING ONLY: CPT | Performed by: INTERNAL MEDICINE

## 2018-04-09 PROCEDURE — 93018 CV STRESS TEST I&R ONLY: CPT | Performed by: INTERNAL MEDICINE

## 2018-04-10 ENCOUNTER — PRIOR ORIGINAL RECORDS (OUTPATIENT)
Dept: OTHER | Age: 73
End: 2018-04-10

## 2018-04-12 ENCOUNTER — PRIOR ORIGINAL RECORDS (OUTPATIENT)
Dept: OTHER | Age: 73
End: 2018-04-12

## 2018-04-13 ENCOUNTER — CARDPULM VISIT (OUTPATIENT)
Dept: CARDIAC REHAB | Facility: HOSPITAL | Age: 73
End: 2018-04-13
Attending: INTERNAL MEDICINE
Payer: MEDICARE

## 2018-04-13 VITALS
WEIGHT: 136.38 LBS | DIASTOLIC BLOOD PRESSURE: 68 MMHG | BODY MASS INDEX: 25.1 KG/M2 | HEART RATE: 62 BPM | OXYGEN SATURATION: 96 % | SYSTOLIC BLOOD PRESSURE: 152 MMHG | HEIGHT: 62 IN

## 2018-04-17 ENCOUNTER — PATIENT OUTREACH (OUTPATIENT)
Dept: FAMILY MEDICINE CLINIC | Facility: CLINIC | Age: 73
End: 2018-04-17

## 2018-04-17 ENCOUNTER — TELEPHONE (OUTPATIENT)
Dept: FAMILY MEDICINE CLINIC | Facility: CLINIC | Age: 73
End: 2018-04-17

## 2018-04-17 NOTE — TELEPHONE ENCOUNTER
LMOM for pt that at last OV with LE 3-5-18, LE entered labs. Gave fasting instructions and CS phone number. Requested a return call with question. Task completed.

## 2018-04-27 ENCOUNTER — APPOINTMENT (OUTPATIENT)
Dept: CARDIAC REHAB | Facility: HOSPITAL | Age: 73
End: 2018-04-27
Attending: INTERNAL MEDICINE
Payer: MEDICARE

## 2018-04-30 ENCOUNTER — CARDPULM VISIT (OUTPATIENT)
Dept: CARDIAC REHAB | Facility: HOSPITAL | Age: 73
End: 2018-04-30
Attending: INTERNAL MEDICINE
Payer: MEDICARE

## 2018-04-30 PROCEDURE — 93798 PHYS/QHP OP CAR RHAB W/ECG: CPT

## 2018-05-02 ENCOUNTER — APPOINTMENT (OUTPATIENT)
Dept: CARDIAC REHAB | Facility: HOSPITAL | Age: 73
End: 2018-05-02
Attending: INTERNAL MEDICINE
Payer: MEDICARE

## 2018-05-04 ENCOUNTER — PRIOR ORIGINAL RECORDS (OUTPATIENT)
Dept: OTHER | Age: 73
End: 2018-05-04

## 2018-05-09 ENCOUNTER — CARDPULM VISIT (OUTPATIENT)
Dept: CARDIAC REHAB | Facility: HOSPITAL | Age: 73
End: 2018-05-09
Attending: INTERNAL MEDICINE
Payer: MEDICARE

## 2018-05-09 PROCEDURE — 93798 PHYS/QHP OP CAR RHAB W/ECG: CPT

## 2018-05-09 NOTE — H&P
[facility]  History & Physical    Daron Barger Patient Status:  Inpatient    1945 MRN [id number]   [facility] 8NE-A Attending No att. providers found   [facility] Day # 6 PCP Jesus Walters DO     Admitting Diagnosis:  coronary artery di leg, 2/2015   • Proteinuria    • Shortness of breath    • Sleep apnea     CPAP   • Unspecified essential hypertension    • Visual impairment     scar on left retina; glasses       Past Surgical History:  Past Surgical History:  No date: ABLATION      Comme alcohol. She reports that she uses drugs.     Family History:  Family History   Problem Relation Age of Onset   • Heart Disorder Father    • Diabetes Mother    • Diabetes Brother    • Heart Disorder Brother        Review of Systems:  Constitutional: No feve

## 2018-05-10 ENCOUNTER — PRIOR ORIGINAL RECORDS (OUTPATIENT)
Dept: OTHER | Age: 73
End: 2018-05-10

## 2018-05-10 ENCOUNTER — APPOINTMENT (OUTPATIENT)
Dept: LAB | Age: 73
End: 2018-05-10
Attending: FAMILY MEDICINE
Payer: MEDICARE

## 2018-05-10 DIAGNOSIS — E08.42 DIABETIC POLYNEUROPATHY ASSOCIATED WITH DIABETES MELLITUS DUE TO UNDERLYING CONDITION (HCC): ICD-10-CM

## 2018-05-10 DIAGNOSIS — Z79.4 DIABETES MELLITUS DUE TO UNDERLYING CONDITION WITH DIABETIC PERIPHERAL ANGIOPATHY WITHOUT GANGRENE, WITH LONG-TERM CURRENT USE OF INSULIN (HCC): ICD-10-CM

## 2018-05-10 DIAGNOSIS — E08.51 DIABETES MELLITUS DUE TO UNDERLYING CONDITION WITH DIABETIC PERIPHERAL ANGIOPATHY WITHOUT GANGRENE, WITH LONG-TERM CURRENT USE OF INSULIN (HCC): ICD-10-CM

## 2018-05-10 PROCEDURE — 80053 COMPREHEN METABOLIC PANEL: CPT

## 2018-05-10 PROCEDURE — 83036 HEMOGLOBIN GLYCOSYLATED A1C: CPT

## 2018-05-10 PROCEDURE — 80061 LIPID PANEL: CPT

## 2018-05-10 PROCEDURE — 36415 COLL VENOUS BLD VENIPUNCTURE: CPT

## 2018-05-11 ENCOUNTER — CARDPULM VISIT (OUTPATIENT)
Dept: CARDIAC REHAB | Facility: HOSPITAL | Age: 73
End: 2018-05-11
Attending: INTERNAL MEDICINE
Payer: MEDICARE

## 2018-05-11 PROCEDURE — 93798 PHYS/QHP OP CAR RHAB W/ECG: CPT

## 2018-05-14 ENCOUNTER — CARDPULM VISIT (OUTPATIENT)
Dept: CARDIAC REHAB | Facility: HOSPITAL | Age: 73
End: 2018-05-14
Attending: INTERNAL MEDICINE
Payer: MEDICARE

## 2018-05-14 PROCEDURE — 93798 PHYS/QHP OP CAR RHAB W/ECG: CPT

## 2018-05-16 ENCOUNTER — CARDPULM VISIT (OUTPATIENT)
Dept: CARDIAC REHAB | Facility: HOSPITAL | Age: 73
End: 2018-05-16
Attending: INTERNAL MEDICINE
Payer: MEDICARE

## 2018-05-16 PROCEDURE — 93798 PHYS/QHP OP CAR RHAB W/ECG: CPT

## 2018-05-17 ENCOUNTER — PRIOR ORIGINAL RECORDS (OUTPATIENT)
Dept: OTHER | Age: 73
End: 2018-05-17

## 2018-05-18 ENCOUNTER — CARDPULM VISIT (OUTPATIENT)
Dept: CARDIAC REHAB | Facility: HOSPITAL | Age: 73
End: 2018-05-18
Attending: INTERNAL MEDICINE
Payer: MEDICARE

## 2018-05-18 PROCEDURE — 93798 PHYS/QHP OP CAR RHAB W/ECG: CPT

## 2018-05-23 ENCOUNTER — CARDPULM VISIT (OUTPATIENT)
Dept: CARDIAC REHAB | Facility: HOSPITAL | Age: 73
End: 2018-05-23
Attending: INTERNAL MEDICINE
Payer: MEDICARE

## 2018-05-23 PROCEDURE — 93798 PHYS/QHP OP CAR RHAB W/ECG: CPT

## 2018-05-25 ENCOUNTER — CARDPULM VISIT (OUTPATIENT)
Dept: CARDIAC REHAB | Facility: HOSPITAL | Age: 73
End: 2018-05-25
Attending: INTERNAL MEDICINE
Payer: MEDICARE

## 2018-05-25 PROCEDURE — 93798 PHYS/QHP OP CAR RHAB W/ECG: CPT

## 2018-05-28 ENCOUNTER — APPOINTMENT (OUTPATIENT)
Dept: CARDIAC REHAB | Facility: HOSPITAL | Age: 73
End: 2018-05-28
Attending: INTERNAL MEDICINE
Payer: MEDICARE

## 2018-05-30 ENCOUNTER — CARDPULM VISIT (OUTPATIENT)
Dept: CARDIAC REHAB | Facility: HOSPITAL | Age: 73
End: 2018-05-30
Attending: INTERNAL MEDICINE
Payer: MEDICARE

## 2018-05-30 PROCEDURE — 93798 PHYS/QHP OP CAR RHAB W/ECG: CPT

## 2018-06-01 ENCOUNTER — CARDPULM VISIT (OUTPATIENT)
Dept: CARDIAC REHAB | Facility: HOSPITAL | Age: 73
End: 2018-06-01
Attending: INTERNAL MEDICINE
Payer: MEDICARE

## 2018-06-01 PROCEDURE — 93798 PHYS/QHP OP CAR RHAB W/ECG: CPT

## 2018-06-04 ENCOUNTER — CARDPULM VISIT (OUTPATIENT)
Dept: CARDIAC REHAB | Facility: HOSPITAL | Age: 73
End: 2018-06-04
Attending: INTERNAL MEDICINE
Payer: MEDICARE

## 2018-06-04 PROCEDURE — 93798 PHYS/QHP OP CAR RHAB W/ECG: CPT

## 2018-06-06 ENCOUNTER — HOSPITAL ENCOUNTER (OUTPATIENT)
Dept: LAB | Facility: HOSPITAL | Age: 73
Discharge: HOME | End: 2018-06-06
Attending: FAMILY MEDICINE
Payer: MEDICARE

## 2018-06-06 ENCOUNTER — CARDPULM VISIT (OUTPATIENT)
Dept: CARDIAC REHAB | Facility: HOSPITAL | Age: 73
End: 2018-06-06
Attending: INTERNAL MEDICINE
Payer: MEDICARE

## 2018-06-06 DIAGNOSIS — R79.89 ELEVATED LFTS: ICD-10-CM

## 2018-06-06 PROCEDURE — 93798 PHYS/QHP OP CAR RHAB W/ECG: CPT

## 2018-06-06 PROCEDURE — 84450 TRANSFERASE (AST) (SGOT): CPT | Performed by: FAMILY MEDICINE

## 2018-06-06 PROCEDURE — 36415 COLL VENOUS BLD VENIPUNCTURE: CPT | Performed by: FAMILY MEDICINE

## 2018-06-06 PROCEDURE — 84460 ALANINE AMINO (ALT) (SGPT): CPT | Performed by: FAMILY MEDICINE

## 2018-06-08 ENCOUNTER — CARDPULM VISIT (OUTPATIENT)
Dept: CARDIAC REHAB | Facility: HOSPITAL | Age: 73
End: 2018-06-08
Attending: INTERNAL MEDICINE
Payer: MEDICARE

## 2018-06-08 PROCEDURE — 93798 PHYS/QHP OP CAR RHAB W/ECG: CPT

## 2018-06-11 ENCOUNTER — CARDPULM VISIT (OUTPATIENT)
Dept: CARDIAC REHAB | Facility: HOSPITAL | Age: 73
End: 2018-06-11
Attending: INTERNAL MEDICINE
Payer: MEDICARE

## 2018-06-11 PROCEDURE — 93798 PHYS/QHP OP CAR RHAB W/ECG: CPT

## 2018-06-12 ENCOUNTER — OFFICE VISIT (OUTPATIENT)
Dept: FAMILY MEDICINE CLINIC | Facility: CLINIC | Age: 73
End: 2018-06-12

## 2018-06-12 VITALS
RESPIRATION RATE: 20 BRPM | TEMPERATURE: 98 F | BODY MASS INDEX: 24.84 KG/M2 | OXYGEN SATURATION: 98 % | DIASTOLIC BLOOD PRESSURE: 80 MMHG | HEIGHT: 62 IN | WEIGHT: 135 LBS | SYSTOLIC BLOOD PRESSURE: 130 MMHG | HEART RATE: 74 BPM

## 2018-06-12 DIAGNOSIS — I77.9 PAOD (PERIPHERAL ARTERIAL OCCLUSIVE DISEASE) (HCC): ICD-10-CM

## 2018-06-12 DIAGNOSIS — I25.810 CORONARY ARTERY DISEASE INVOLVING CORONARY BYPASS GRAFT OF NATIVE HEART WITHOUT ANGINA PECTORIS: ICD-10-CM

## 2018-06-12 DIAGNOSIS — M79.7 FIBROMYALGIA: ICD-10-CM

## 2018-06-12 DIAGNOSIS — I24.8 DEMAND ISCHEMIA (HCC): ICD-10-CM

## 2018-06-12 DIAGNOSIS — E87.1 HYPONATREMIA: ICD-10-CM

## 2018-06-12 DIAGNOSIS — E11.8 TYPE 2 DIABETES MELLITUS WITH COMPLICATION, WITH LONG-TERM CURRENT USE OF INSULIN (HCC): ICD-10-CM

## 2018-06-12 DIAGNOSIS — I73.9 CLAUDICATION (HCC): ICD-10-CM

## 2018-06-12 DIAGNOSIS — Z96.1 PSEUDOPHAKIA: ICD-10-CM

## 2018-06-12 DIAGNOSIS — I50.1 PULMONARY EDEMA CARDIAC CAUSE (HCC): ICD-10-CM

## 2018-06-12 DIAGNOSIS — M43.16 SPONDYLOLISTHESIS OF LUMBAR REGION: ICD-10-CM

## 2018-06-12 DIAGNOSIS — I10 BENIGN ESSENTIAL HTN: ICD-10-CM

## 2018-06-12 DIAGNOSIS — G47.33 OSA ON CPAP: ICD-10-CM

## 2018-06-12 DIAGNOSIS — E78.5 DYSLIPIDEMIA: ICD-10-CM

## 2018-06-12 DIAGNOSIS — M48.061 SPINAL STENOSIS OF LUMBAR REGION, UNSPECIFIED WHETHER NEUROGENIC CLAUDICATION PRESENT: ICD-10-CM

## 2018-06-12 DIAGNOSIS — R74.8 ELEVATED LIVER ENZYMES: ICD-10-CM

## 2018-06-12 DIAGNOSIS — G89.4 CHRONIC PAIN SYNDROME: ICD-10-CM

## 2018-06-12 DIAGNOSIS — Z91.81 AT RISK FOR FALLING: ICD-10-CM

## 2018-06-12 DIAGNOSIS — Z95.1 S/P CABG X 3: ICD-10-CM

## 2018-06-12 DIAGNOSIS — I73.9 PERIPHERAL VASCULAR DISEASE OF LOWER EXTREMITY (HCC): ICD-10-CM

## 2018-06-12 DIAGNOSIS — Z00.00 ENCOUNTER FOR ANNUAL HEALTH EXAMINATION: Primary | ICD-10-CM

## 2018-06-12 DIAGNOSIS — Z13.820 SCREENING FOR OSTEOPOROSIS: ICD-10-CM

## 2018-06-12 DIAGNOSIS — Z98.890 H/O PERIPHERAL ARTERY BYPASS: ICD-10-CM

## 2018-06-12 DIAGNOSIS — H31.012: ICD-10-CM

## 2018-06-12 DIAGNOSIS — Z13.31 DEPRESSION SCREENING: ICD-10-CM

## 2018-06-12 DIAGNOSIS — J44.9 CHRONIC OBSTRUCTIVE PULMONARY DISEASE, UNSPECIFIED COPD TYPE (HCC): ICD-10-CM

## 2018-06-12 DIAGNOSIS — I70.229 EXTREMITY ATHEROSCLEROSIS WITH RESTING PAIN (HCC): ICD-10-CM

## 2018-06-12 DIAGNOSIS — E08.51 DIABETES MELLITUS DUE TO UNDERLYING CONDITION WITH DIABETIC PERIPHERAL ANGIOPATHY WITHOUT GANGRENE, WITH LONG-TERM CURRENT USE OF INSULIN (HCC): ICD-10-CM

## 2018-06-12 DIAGNOSIS — Z79.4 TYPE 2 DIABETES MELLITUS WITH COMPLICATION, WITH LONG-TERM CURRENT USE OF INSULIN (HCC): ICD-10-CM

## 2018-06-12 DIAGNOSIS — Z99.89 OSA ON CPAP: ICD-10-CM

## 2018-06-12 DIAGNOSIS — I50.31 ACUTE DIASTOLIC CHF (CONGESTIVE HEART FAILURE) (HCC): ICD-10-CM

## 2018-06-12 DIAGNOSIS — Z78.0 ASYMPTOMATIC MENOPAUSE: ICD-10-CM

## 2018-06-12 DIAGNOSIS — E11.3292 MILD NONPROLIFERATIVE DIABETIC RETINOPATHY OF LEFT EYE WITHOUT MACULAR EDEMA ASSOCIATED WITH TYPE 2 DIABETES MELLITUS (HCC): ICD-10-CM

## 2018-06-12 DIAGNOSIS — E08.42 DIABETIC POLYNEUROPATHY ASSOCIATED WITH DIABETES MELLITUS DUE TO UNDERLYING CONDITION (HCC): ICD-10-CM

## 2018-06-12 DIAGNOSIS — F32.0 MILD SINGLE CURRENT EPISODE OF MAJOR DEPRESSIVE DISORDER (HCC): ICD-10-CM

## 2018-06-12 DIAGNOSIS — Z79.4 DIABETES MELLITUS DUE TO UNDERLYING CONDITION WITH DIABETIC PERIPHERAL ANGIOPATHY WITHOUT GANGRENE, WITH LONG-TERM CURRENT USE OF INSULIN (HCC): ICD-10-CM

## 2018-06-12 DIAGNOSIS — H43.812 PVD (POSTERIOR VITREOUS DETACHMENT), LEFT: ICD-10-CM

## 2018-06-12 DIAGNOSIS — Z12.31 ENCOUNTER FOR SCREENING MAMMOGRAM FOR HIGH-RISK PATIENT: ICD-10-CM

## 2018-06-12 PROBLEM — L97.921 SKIN ULCER OF LEFT LOWER LEG, LIMITED TO BREAKDOWN OF SKIN (HCC): Status: RESOLVED | Noted: 2017-11-27 | Resolved: 2018-06-12

## 2018-06-12 PROBLEM — L03.116 LEFT LEG CELLULITIS: Status: RESOLVED | Noted: 2017-11-27 | Resolved: 2018-06-12

## 2018-06-12 PROBLEM — I25.10 CAD (CORONARY ARTERY DISEASE): Status: RESOLVED | Noted: 2018-02-20 | Resolved: 2018-06-12

## 2018-06-12 PROBLEM — R79.89 ELEVATED TROPONIN: Status: RESOLVED | Noted: 2018-02-03 | Resolved: 2018-06-12

## 2018-06-12 PROBLEM — R77.8 ELEVATED TROPONIN: Status: RESOLVED | Noted: 2018-02-03 | Resolved: 2018-06-12

## 2018-06-12 PROBLEM — J81.0 ACUTE PULMONARY EDEMA (HCC): Status: RESOLVED | Noted: 2018-02-03 | Resolved: 2018-06-12

## 2018-06-12 PROBLEM — R07.9 ACUTE CHEST PAIN: Status: RESOLVED | Noted: 2018-02-03 | Resolved: 2018-06-12

## 2018-06-12 PROCEDURE — 90732 PPSV23 VACC 2 YRS+ SUBQ/IM: CPT | Performed by: FAMILY MEDICINE

## 2018-06-12 PROCEDURE — G0009 ADMIN PNEUMOCOCCAL VACCINE: HCPCS | Performed by: FAMILY MEDICINE

## 2018-06-12 PROCEDURE — G0444 DEPRESSION SCREEN ANNUAL: HCPCS | Performed by: FAMILY MEDICINE

## 2018-06-12 PROCEDURE — 99214 OFFICE O/P EST MOD 30 MIN: CPT | Performed by: FAMILY MEDICINE

## 2018-06-12 PROCEDURE — G0439 PPPS, SUBSEQ VISIT: HCPCS | Performed by: FAMILY MEDICINE

## 2018-06-12 RX ORDER — LISINOPRIL 20 MG/1
TABLET ORAL
Refills: 3 | COMMUNITY
Start: 2018-05-06 | End: 2018-10-15

## 2018-06-12 NOTE — PATIENT INSTRUCTIONS
Anne Barger's SCREENING SCHEDULE   Tests on this list are recommended by your physician but may not be covered, or covered at this frequency, by your insurer. Please check with your insurance carrier before scheduling to verify coverage.    PREVENTATIVE non-screening if indicated for medical reasons Electrocardiogram date02/21/2018 Routine EKG is not a screening covered service except at the [place] to Medicare Visit    Abdominal aortic aneurysm screening (once between ages 73-68) IPPE only No results fou are no preventive care reminders to display for this patient. Chlamydia  Annually if high risk No results found for: CHLAMYDIA No flowsheet data found.     Screening Mammogram      Mammogram    Recommend Annually to at least age 76, and as needed after Hemoteq. This site has a lot of good information including definitions of the different types of Advance Directives.  It also has the State forms available on it's website for anyone to review and print using their home computer and p

## 2018-06-12 NOTE — PROGRESS NOTES
HPI:   Floyd Tavarez is a 75year old female who presents for a Medicare Subsequent Annual Wellness visit (Pt already had Initial Annual Wellness).     Pt also here to follow up on    Fibromyalgia     Peripheral vascular disease of lower extremity ([facility] [address] below:  She has no issues with dressing and bathing based on screening of functional status. She has Vision problems based on screening of functional status.    Vision Problems? : Yes         She has problems with Memory based on sc artery bypass- sees cards      At risk for falling- doing well overall      Claudication [facility])- sees cards      Coronary artery disease involving coronary bypass graft of native heart without angina pectoris- sees cards      Macula scar of posterior pole, 34 05/10/2018          Last Chemistry Labs:     Lab Results  Component Value Date   AST 44 (H) 06/06/2018   ALT 73 (H) 06/06/2018   CA 9.2 05/10/2018   ALB 3.5 05/10/2018   TSH 0.385 02/11/2015   CREATSERUM 0.73 05/10/2018    (H) 05/10/2018        C ([facility] Coronary atherosclerosis; Deep vein thrombosis ([facility] DIABETES; Disorder of liver; Elevated cholesterol; Elevated hemoglobin A1c; Fibromyalgia; Heart attack ([facility] (03/2008); Heart disease; High blood pressure; High cholesterol;  History of blood holbrook pain  NEURO: denies headaches  PSYCHE: denies depression or anxiety  HEMATOLOGIC: denies hx of anemia  ENDOCRINE: denies thyroid history  ALL/ASTHMA: denies asthma    EXAM:   /80   Pulse 74   Temp 98.4 °F (36.9 °C) (Oral)   Resp 20   Ht 62\"   Wt 135 appearance, no masses or tenderness, Inspection negative, No nipple retraction or dimpling, No nipple discharge or bleeding, No axillary or supraclavicular adenopathy, Normal to palpation without dominant masses    Vaccination History     Immunization Hist with long-term current use of insulin ([facility] [address]- stable cpm   -     LIPID PANEL; Future  -     COMP METABOLIC PANEL (14);  Future  -     HEMOGLOBIN A1C; Future  -     OFFICE/OUTPT VISIT,NOAH SERVIN IV    Type 2 diabetes mellitus with complication, with long-term c METABOLIC PANEL (14); Future  -     HEMOGLOBIN A1C; Future  -     OFFICE/OUTPT VISIT,EST,LEVL IV    Encounter for screening mammogram for high-risk patient  -     [facility] ARINA 2D+3D SCREENING BILAT (CPT=77067/02597);  Future    Screening for osteoporosis  - medically necessary Electrocardiogram date02/21/2018       Colorectal Cancer Screening      Colonoscopy Screen every 10 years Colonoscopy,10 Years due on 04/13/2026 Update Health Maintenance if applicable    Flex Sigmoidoscopy Screen every 10 years No resu Part B No vaccine history found This may be covered with your prescription benefits, but Medicare does not cover unless Medically needed    Zoster  Not covered by Medicare Part B No vaccine history found This may be covered with your pharmacy  prescription

## 2018-06-13 ENCOUNTER — CARDPULM VISIT (OUTPATIENT)
Dept: CARDIAC REHAB | Facility: HOSPITAL | Age: 73
End: 2018-06-13
Attending: INTERNAL MEDICINE
Payer: MEDICARE

## 2018-06-13 PROCEDURE — 93798 PHYS/QHP OP CAR RHAB W/ECG: CPT

## 2018-06-15 ENCOUNTER — CARDPULM VISIT (OUTPATIENT)
Dept: CARDIAC REHAB | Facility: HOSPITAL | Age: 73
End: 2018-06-15
Attending: INTERNAL MEDICINE
Payer: MEDICARE

## 2018-06-15 PROCEDURE — 93798 PHYS/QHP OP CAR RHAB W/ECG: CPT

## 2018-06-18 ENCOUNTER — CARDPULM VISIT (OUTPATIENT)
Dept: CARDIAC REHAB | Facility: HOSPITAL | Age: 73
End: 2018-06-18
Attending: INTERNAL MEDICINE
Payer: MEDICARE

## 2018-06-18 PROCEDURE — 93798 PHYS/QHP OP CAR RHAB W/ECG: CPT

## 2018-06-20 ENCOUNTER — CARDPULM VISIT (OUTPATIENT)
Dept: CARDIAC REHAB | Facility: HOSPITAL | Age: 73
End: 2018-06-20
Attending: INTERNAL MEDICINE
Payer: MEDICARE

## 2018-06-20 PROCEDURE — 93798 PHYS/QHP OP CAR RHAB W/ECG: CPT

## 2018-06-22 ENCOUNTER — CARDPULM VISIT (OUTPATIENT)
Dept: CARDIAC REHAB | Facility: HOSPITAL | Age: 73
End: 2018-06-22
Attending: INTERNAL MEDICINE
Payer: MEDICARE

## 2018-06-22 PROCEDURE — 93798 PHYS/QHP OP CAR RHAB W/ECG: CPT

## 2018-06-25 ENCOUNTER — CARDPULM VISIT (OUTPATIENT)
Dept: CARDIAC REHAB | Facility: HOSPITAL | Age: 73
End: 2018-06-25
Attending: INTERNAL MEDICINE
Payer: MEDICARE

## 2018-06-25 ENCOUNTER — PRIOR ORIGINAL RECORDS (OUTPATIENT)
Dept: OTHER | Age: 73
End: 2018-06-25

## 2018-06-25 PROCEDURE — 93798 PHYS/QHP OP CAR RHAB W/ECG: CPT

## 2018-06-27 ENCOUNTER — CARDPULM VISIT (OUTPATIENT)
Dept: CARDIAC REHAB | Facility: HOSPITAL | Age: 73
End: 2018-06-27
Attending: INTERNAL MEDICINE
Payer: MEDICARE

## 2018-06-27 PROCEDURE — 93798 PHYS/QHP OP CAR RHAB W/ECG: CPT

## 2018-06-29 ENCOUNTER — CARDPULM VISIT (OUTPATIENT)
Dept: CARDIAC REHAB | Facility: HOSPITAL | Age: 73
End: 2018-06-29
Attending: INTERNAL MEDICINE
Payer: MEDICARE

## 2018-06-29 PROCEDURE — 93798 PHYS/QHP OP CAR RHAB W/ECG: CPT

## 2018-07-02 ENCOUNTER — CARDPULM VISIT (OUTPATIENT)
Dept: CARDIAC REHAB | Facility: HOSPITAL | Age: 73
End: 2018-07-02
Attending: INTERNAL MEDICINE
Payer: MEDICARE

## 2018-07-02 PROCEDURE — 93798 PHYS/QHP OP CAR RHAB W/ECG: CPT

## 2018-07-04 ENCOUNTER — APPOINTMENT (OUTPATIENT)
Dept: CARDIAC REHAB | Facility: HOSPITAL | Age: 73
End: 2018-07-04
Attending: INTERNAL MEDICINE
Payer: MEDICARE

## 2018-07-06 ENCOUNTER — CARDPULM VISIT (OUTPATIENT)
Dept: CARDIAC REHAB | Facility: HOSPITAL | Age: 73
End: 2018-07-06
Attending: INTERNAL MEDICINE
Payer: MEDICARE

## 2018-07-06 PROCEDURE — 93798 PHYS/QHP OP CAR RHAB W/ECG: CPT

## 2018-07-09 ENCOUNTER — CARDPULM VISIT (OUTPATIENT)
Dept: CARDIAC REHAB | Facility: HOSPITAL | Age: 73
End: 2018-07-09
Attending: INTERNAL MEDICINE
Payer: MEDICARE

## 2018-07-09 PROCEDURE — 93798 PHYS/QHP OP CAR RHAB W/ECG: CPT

## 2018-07-11 ENCOUNTER — CARDPULM VISIT (OUTPATIENT)
Dept: CARDIAC REHAB | Facility: HOSPITAL | Age: 73
End: 2018-07-11
Attending: INTERNAL MEDICINE
Payer: MEDICARE

## 2018-07-11 PROCEDURE — 93798 PHYS/QHP OP CAR RHAB W/ECG: CPT

## 2018-07-13 ENCOUNTER — CARDPULM VISIT (OUTPATIENT)
Dept: CARDIAC REHAB | Facility: HOSPITAL | Age: 73
End: 2018-07-13
Attending: INTERNAL MEDICINE
Payer: MEDICARE

## 2018-07-13 PROCEDURE — 93798 PHYS/QHP OP CAR RHAB W/ECG: CPT

## 2018-07-16 ENCOUNTER — CARDPULM VISIT (OUTPATIENT)
Dept: CARDIAC REHAB | Facility: HOSPITAL | Age: 73
End: 2018-07-16
Attending: INTERNAL MEDICINE
Payer: MEDICARE

## 2018-07-16 ENCOUNTER — PRIOR ORIGINAL RECORDS (OUTPATIENT)
Dept: OTHER | Age: 73
End: 2018-07-16

## 2018-07-16 ENCOUNTER — HOSPITAL ENCOUNTER (OUTPATIENT)
Dept: LAB | Facility: HOSPITAL | Age: 73
Discharge: HOME | End: 2018-07-16
Attending: INTERNAL MEDICINE
Payer: MEDICARE

## 2018-07-16 LAB
BUN BLD-MCNC: 36 MG/DL (ref 8–20)
CALCIUM BLD-MCNC: 10.2 MG/DL (ref 8.3–10.3)
CHLORIDE: 96 MMOL/L (ref 101–111)
CO2: 26 MMOL/L (ref 22–32)
CREAT BLD-MCNC: 0.91 MG/DL (ref 0.55–1.02)
GLUCOSE BLD-MCNC: 169 MG/DL (ref 70–99)
POTASSIUM SERPL-SCNC: 5.1 MMOL/L (ref 3.6–5.1)
SODIUM SERPL-SCNC: 132 MMOL/L (ref 136–144)

## 2018-07-16 PROCEDURE — 93798 PHYS/QHP OP CAR RHAB W/ECG: CPT

## 2018-07-16 PROCEDURE — 36415 COLL VENOUS BLD VENIPUNCTURE: CPT | Performed by: INTERNAL MEDICINE

## 2018-07-16 PROCEDURE — 80048 BASIC METABOLIC PNL TOTAL CA: CPT | Performed by: INTERNAL MEDICINE

## 2018-07-17 ENCOUNTER — PRIOR ORIGINAL RECORDS (OUTPATIENT)
Dept: OTHER | Age: 73
End: 2018-07-17

## 2018-07-17 LAB
BUN: 63 MG/DL
CALCIUM: 10.2 MG/DL
CHLORIDE: 96 MEQ/L
CREATININE, SERUM: 0.91 MG/DL
POTASSIUM, SERUM: 5.1 MEQ/L
SODIUM: 132 MEQ/L

## 2018-07-18 ENCOUNTER — CARDPULM VISIT (OUTPATIENT)
Dept: CARDIAC REHAB | Facility: HOSPITAL | Age: 73
End: 2018-07-18
Attending: INTERNAL MEDICINE
Payer: MEDICARE

## 2018-07-18 PROCEDURE — 93798 PHYS/QHP OP CAR RHAB W/ECG: CPT

## 2018-07-20 ENCOUNTER — CARDPULM VISIT (OUTPATIENT)
Dept: CARDIAC REHAB | Facility: HOSPITAL | Age: 73
End: 2018-07-20
Attending: INTERNAL MEDICINE
Payer: MEDICARE

## 2018-07-20 PROCEDURE — 93798 PHYS/QHP OP CAR RHAB W/ECG: CPT

## 2018-07-23 ENCOUNTER — CARDPULM VISIT (OUTPATIENT)
Dept: CARDIAC REHAB | Facility: HOSPITAL | Age: 73
End: 2018-07-23
Attending: INTERNAL MEDICINE
Payer: MEDICARE

## 2018-07-23 PROCEDURE — 93798 PHYS/QHP OP CAR RHAB W/ECG: CPT

## 2018-07-24 ENCOUNTER — PRIOR ORIGINAL RECORDS (OUTPATIENT)
Dept: OTHER | Age: 73
End: 2018-07-24

## 2018-07-25 ENCOUNTER — CARDPULM VISIT (OUTPATIENT)
Dept: CARDIAC REHAB | Facility: HOSPITAL | Age: 73
End: 2018-07-25
Attending: INTERNAL MEDICINE
Payer: MEDICARE

## 2018-07-25 PROCEDURE — 93798 PHYS/QHP OP CAR RHAB W/ECG: CPT

## 2018-07-27 ENCOUNTER — CARDPULM VISIT (OUTPATIENT)
Dept: CARDIAC REHAB | Facility: HOSPITAL | Age: 73
End: 2018-07-27
Attending: INTERNAL MEDICINE
Payer: MEDICARE

## 2018-07-27 PROCEDURE — 93798 PHYS/QHP OP CAR RHAB W/ECG: CPT

## 2018-07-30 ENCOUNTER — CARDPULM VISIT (OUTPATIENT)
Dept: CARDIAC REHAB | Facility: HOSPITAL | Age: 73
End: 2018-07-30
Attending: INTERNAL MEDICINE
Payer: MEDICARE

## 2018-07-30 PROCEDURE — 93798 PHYS/QHP OP CAR RHAB W/ECG: CPT

## 2018-08-02 ENCOUNTER — PRIOR ORIGINAL RECORDS (OUTPATIENT)
Dept: OTHER | Age: 73
End: 2018-08-02

## 2018-08-03 LAB
CHOLESTEROL, TOTAL: 157 MG/DL
HDL CHOLESTEROL: 69 MG/DL
HEMOGLOBIN A1C: 6.4 %
LDL CHOLESTEROL: 81 MG/DL
TRIGLYCERIDES: 34 MG/DL

## 2018-08-10 ENCOUNTER — PRIOR ORIGINAL RECORDS (OUTPATIENT)
Dept: OTHER | Age: 73
End: 2018-08-10

## 2018-08-13 ENCOUNTER — PRIOR ORIGINAL RECORDS (OUTPATIENT)
Dept: OTHER | Age: 73
End: 2018-08-13

## 2018-08-15 ENCOUNTER — PRIOR ORIGINAL RECORDS (OUTPATIENT)
Dept: OTHER | Age: 73
End: 2018-08-15

## 2018-08-15 ENCOUNTER — HOSPITAL ENCOUNTER (OUTPATIENT)
Dept: LAB | Facility: HOSPITAL | Age: 73
Discharge: HOME | End: 2018-08-15
Attending: FAMILY MEDICINE
Payer: MEDICARE

## 2018-08-15 DIAGNOSIS — Z79.4 DIABETES MELLITUS DUE TO UNDERLYING CONDITION WITH DIABETIC PERIPHERAL ANGIOPATHY WITHOUT GANGRENE, WITH LONG-TERM CURRENT USE OF INSULIN (HCC): ICD-10-CM

## 2018-08-15 DIAGNOSIS — E08.51 DIABETES MELLITUS DUE TO UNDERLYING CONDITION WITH DIABETIC PERIPHERAL ANGIOPATHY WITHOUT GANGRENE, WITH LONG-TERM CURRENT USE OF INSULIN (HCC): ICD-10-CM

## 2018-08-15 DIAGNOSIS — E11.8 TYPE 2 DIABETES MELLITUS WITH COMPLICATION, WITH LONG-TERM CURRENT USE OF INSULIN (HCC): ICD-10-CM

## 2018-08-15 DIAGNOSIS — E08.42 DIABETIC POLYNEUROPATHY ASSOCIATED WITH DIABETES MELLITUS DUE TO UNDERLYING CONDITION (HCC): ICD-10-CM

## 2018-08-15 DIAGNOSIS — Z79.4 TYPE 2 DIABETES MELLITUS WITH COMPLICATION, WITH LONG-TERM CURRENT USE OF INSULIN (HCC): ICD-10-CM

## 2018-08-15 LAB
ALBUMIN SERPL-MCNC: 3.2 G/DL (ref 3.5–4.8)
ALBUMIN/GLOB SERPL: 0.9 {RATIO} (ref 1–2)
ALP LIVER SERPL-CCNC: 201 U/L (ref 55–142)
ALT SERPL-CCNC: 33 U/L (ref 14–54)
ANION GAP SERPL CALC-SCNC: 8 MMOL/L (ref 0–18)
AST SERPL-CCNC: 27 U/L (ref 15–41)
BILIRUB SERPL-MCNC: 1.6 MG/DL (ref 0.1–2)
BUN BLD-MCNC: 23 MG/DL (ref 8–20)
BUN/CREAT SERPL: 29.5 (ref 10–20)
CALCIUM BLD-MCNC: 9.7 MG/DL (ref 8.3–10.3)
CHLORIDE SERPL-SCNC: 107 MMOL/L (ref 101–111)
CHOLEST SMN-MCNC: 159 MG/DL (ref ?–200)
CO2 SERPL-SCNC: 26 MMOL/L (ref 22–32)
CREAT BLD-MCNC: 0.78 MG/DL (ref 0.55–1.02)
EST. AVERAGE GLUCOSE BLD GHB EST-MCNC: 160 MG/DL (ref 68–126)
GLOBULIN PLAS-MCNC: 3.4 G/DL (ref 2.5–3.7)
GLUCOSE BLD-MCNC: 128 MG/DL (ref 70–99)
HBA1C MFR BLD HPLC: 7.2 % (ref ?–5.7)
HDLC SERPL-MCNC: 52 MG/DL (ref 40–59)
LDLC SERPL CALC-MCNC: 95 MG/DL (ref ?–100)
M PROTEIN MFR SERPL ELPH: 6.6 G/DL (ref 6.1–8.3)
NONHDLC SERPL-MCNC: 107 MG/DL (ref ?–130)
OSMOLALITY SERPL CALC.SUM OF ELEC: 297 MOSM/KG (ref 275–295)
POTASSIUM SERPL-SCNC: 4.4 MMOL/L (ref 3.6–5.1)
SODIUM SERPL-SCNC: 141 MMOL/L (ref 136–144)
TRIGL SERPL-MCNC: 61 MG/DL (ref 30–149)
VLDLC SERPL CALC-MCNC: 12 MG/DL (ref 0–30)

## 2018-08-15 PROCEDURE — 80053 COMPREHEN METABOLIC PANEL: CPT

## 2018-08-15 PROCEDURE — 36415 COLL VENOUS BLD VENIPUNCTURE: CPT

## 2018-08-15 PROCEDURE — 80061 LIPID PANEL: CPT

## 2018-08-15 PROCEDURE — 83036 HEMOGLOBIN GLYCOSYLATED A1C: CPT

## 2018-08-28 ENCOUNTER — HOSPITAL ENCOUNTER (OUTPATIENT)
Dept: CARDIOLOGY CLINIC | Facility: HOSPITAL | Age: 73
Discharge: HOME | End: 2018-08-28
Attending: INTERNAL MEDICINE

## 2018-08-28 ENCOUNTER — PRIOR ORIGINAL RECORDS (OUTPATIENT)
Dept: OTHER | Age: 73
End: 2018-08-28

## 2018-08-28 DIAGNOSIS — I25.10 CORONARY ARTERIOSCLEROSIS: ICD-10-CM

## 2018-08-28 DIAGNOSIS — I73.9 CLAUDICATION (HCC): ICD-10-CM

## 2018-09-07 ENCOUNTER — MYAURORA ACCOUNT LINK (OUTPATIENT)
Dept: OTHER | Age: 73
End: 2018-09-07

## 2018-09-07 ENCOUNTER — PRIOR ORIGINAL RECORDS (OUTPATIENT)
Dept: OTHER | Age: 73
End: 2018-09-07

## 2018-10-05 ENCOUNTER — PRIOR ORIGINAL RECORDS (OUTPATIENT)
Dept: OTHER | Age: 73
End: 2018-10-05

## 2018-10-09 ENCOUNTER — HOSPITAL ENCOUNTER (OUTPATIENT)
Dept: CT IMAGING | Facility: HOSPITAL | Age: 73
Discharge: HOME | End: 2018-10-09
Attending: INTERNAL MEDICINE
Payer: MEDICARE

## 2018-10-09 ENCOUNTER — PRIOR ORIGINAL RECORDS (OUTPATIENT)
Dept: OTHER | Age: 73
End: 2018-10-09

## 2018-10-09 DIAGNOSIS — I73.9 PERIPHERAL VASCULAR DISEASE, UNSPECIFIED (HCC): ICD-10-CM

## 2018-10-09 PROCEDURE — 82565 ASSAY OF CREATININE: CPT

## 2018-10-09 PROCEDURE — 75635 CT ANGIO ABDOMINAL ARTERIES: CPT | Performed by: INTERNAL MEDICINE

## 2018-10-10 LAB — CREATININE, SERUM: 0.9 MG/DL

## 2018-10-12 ENCOUNTER — PRIOR ORIGINAL RECORDS (OUTPATIENT)
Dept: OTHER | Age: 73
End: 2018-10-12

## 2018-10-13 ENCOUNTER — PRIOR ORIGINAL RECORDS (OUTPATIENT)
Dept: OTHER | Age: 73
End: 2018-10-13

## 2018-10-13 ENCOUNTER — HOSPITAL ENCOUNTER (OUTPATIENT)
Dept: GENERAL RADIOLOGY | Facility: HOSPITAL | Age: 73
Discharge: HOME | End: 2018-10-13
Attending: INTERNAL MEDICINE
Payer: MEDICARE

## 2018-10-13 ENCOUNTER — EKG ENCOUNTER (OUTPATIENT)
Dept: LAB | Facility: HOSPITAL | Age: 73
End: 2018-10-13
Attending: INTERNAL MEDICINE
Payer: MEDICARE

## 2018-10-13 DIAGNOSIS — I25.10 CAD (CORONARY ARTERY DISEASE): ICD-10-CM

## 2018-10-13 DIAGNOSIS — I25.10 CAD (CORONARY ARTERY DISEASE): Primary | ICD-10-CM

## 2018-10-13 DIAGNOSIS — I73.9 PERIPHERAL VASCULAR DISEASE, UNSPECIFIED (HCC): ICD-10-CM

## 2018-10-13 PROCEDURE — 71046 X-RAY EXAM CHEST 2 VIEWS: CPT | Performed by: INTERNAL MEDICINE

## 2018-10-13 PROCEDURE — 93010 ELECTROCARDIOGRAM REPORT: CPT | Performed by: INTERNAL MEDICINE

## 2018-10-13 PROCEDURE — 85025 COMPLETE CBC W/AUTO DIFF WBC: CPT

## 2018-10-13 PROCEDURE — 80048 BASIC METABOLIC PNL TOTAL CA: CPT

## 2018-10-13 PROCEDURE — 93005 ELECTROCARDIOGRAM TRACING: CPT

## 2018-10-13 PROCEDURE — 36415 COLL VENOUS BLD VENIPUNCTURE: CPT

## 2018-10-15 RX ORDER — FUROSEMIDE 20 MG/1
20 TABLET ORAL DAILY PRN
COMMUNITY
End: 2019-04-23 | Stop reason: ALTCHOICE

## 2018-10-15 RX ORDER — OMEGA-3-ACID ETHYL ESTERS 1 G/1
1 CAPSULE, LIQUID FILLED ORAL DAILY
Status: ON HOLD | COMMUNITY
End: 2019-09-13

## 2018-10-15 RX ORDER — PROPRANOLOL/HYDROCHLOROTHIAZID 40 MG-25MG
TABLET ORAL DAILY
Status: ON HOLD | COMMUNITY
End: 2019-09-13

## 2018-10-15 RX ORDER — LOSARTAN POTASSIUM 100 MG/1
100 TABLET ORAL DAILY
COMMUNITY
End: 2019-02-20 | Stop reason: ALTCHOICE

## 2018-10-15 NOTE — HISTORICAL OFFICE NOTE
Eddie Estefania  : 1945  ACCOUNT:  [id number]  [phone]  PCP: Dr. Angel Sim     TODAY'S DATE: 2018  DICTATED BY:  [Dr. Adela Jovel: [Followup of History of CABG and Followup of History of drug eluting stent.]    HPI: Daily. used to smoke but quit >20 years ago. CAFFEINE: 3-6 cups of coffee. ALCOHOL: drinks rarely. EXERCISE: no regular exercise. DIET: ADA diet. MARITAL STATUS: . OCCUPATION: retired.      ALLERGIES: Cortisone Acetate - Oral, endocrine system shuts a prescription for Lasix to be taken once a day as needed. She will be seeing 1 of our APN's in the next 3-4 weeks to see how well she is doing with blood pressure control.   In terms of her PAD, patient's most recent lower extremity ultrasound reveals a p daily                          10/06/16 Biotin                5000MCG   daily                                    10/06/16 CoQ-10                100MG     one daily                                10/06/16 N-Acetyl-L-Cysteine   600MG     3 daily

## 2018-10-15 NOTE — HISTORICAL OFFICE NOTE
Lloyd Yang  : 1945  ACCOUNT:  [id number]  [phone]  PCP: Dr. Giselle Levi     TODAY'S DATE: 10/05/2018  DICTATED BY:  SHAYY Venegas]      CHIEF COMPLAINT: [Followup of Claudication and Followup of Hypertension.]    HPI:    [On 10/05/2018, M angioplasty, stent of R popliteal 10/2014, cardiac catheterization February 2018, cardiac catheterization May 2017, carotid artery stenosis, Cath with stent to RCA-3/07, drug eluting stent, hypertension, myocardial infarction, peripheral angiogram, plasty diminished pedal pulses bilaterally. EXT: trace ankle edema bilaterally. DECISION MAKING:      Patient with a history of atherosclerosis of her lower extremities now with worsening claudication. Ultrasound from August was reviewed.   Plan to proceed 08/02/18 Vitamin K2            40MCG     500 mg daily                             10/06/16 Alive Womens 50+                two daily                                10/06/16 Astaxanthin           5MG       10 mg one daily

## 2018-10-16 ENCOUNTER — HOSPITAL ENCOUNTER (OUTPATIENT)
Dept: INTERVENTIONAL RADIOLOGY/VASCULAR | Facility: HOSPITAL | Age: 73
Discharge: HOME | End: 2018-10-16
Attending: INTERNAL MEDICINE | Admitting: INTERNAL MEDICINE
Payer: MEDICARE

## 2018-10-16 VITALS
HEIGHT: 62 IN | RESPIRATION RATE: 18 BRPM | OXYGEN SATURATION: 98 % | DIASTOLIC BLOOD PRESSURE: 58 MMHG | BODY MASS INDEX: 25.76 KG/M2 | SYSTOLIC BLOOD PRESSURE: 178 MMHG | WEIGHT: 140 LBS | HEART RATE: 52 BPM

## 2018-10-16 DIAGNOSIS — I73.9 CLAUDICATION (HCC): ICD-10-CM

## 2018-10-16 LAB
BUN: 40 MG/DL
CALCIUM: 9.3 MG/DL
CHLORIDE: 103 MEQ/L
CREATININE, SERUM: 0.84 MG/DL
GLUCOSE: 161 MG/DL
HEMATOCRIT: 45.6 %
HEMOGLOBIN: 14.9 G/DL
PLATELETS: 343 K/UL
POTASSIUM, SERUM: 5.1 MEQ/L
RED BLOOD COUNT: 4.94 X 10-6/U
SODIUM: 136 MEQ/L
WHITE BLOOD COUNT: 12.3 X 10-3/U

## 2018-10-16 PROCEDURE — 37225 HC TRANSLUM ANGIOPLASTY W ATHERECT FEM POP UNILAT: CPT

## 2018-10-16 PROCEDURE — 75630 X-RAY AORTA LEG ARTERIES: CPT

## 2018-10-16 PROCEDURE — B41G1ZZ FLUOROSCOPY OF LEFT LOWER EXTREMITY ARTERIES USING LOW OSMOLAR CONTRAST: ICD-10-PCS | Performed by: INTERNAL MEDICINE

## 2018-10-16 PROCEDURE — 047S3ZZ DILATION OF LEFT POSTERIOR TIBIAL ARTERY, PERCUTANEOUS APPROACH: ICD-10-PCS | Performed by: INTERNAL MEDICINE

## 2018-10-16 PROCEDURE — B41F1ZZ FLUOROSCOPY OF RIGHT LOWER EXTREMITY ARTERIES USING LOW OSMOLAR CONTRAST: ICD-10-PCS | Performed by: INTERNAL MEDICINE

## 2018-10-16 PROCEDURE — 047N3Z1 DILATION OF LEFT POPLITEAL ARTERY USING DRUG-COATED BALLOON, PERCUTANEOUS APPROACH: ICD-10-PCS | Performed by: INTERNAL MEDICINE

## 2018-10-16 PROCEDURE — 85347 COAGULATION TIME ACTIVATED: CPT

## 2018-10-16 PROCEDURE — B4101ZZ FLUOROSCOPY OF ABDOMINAL AORTA USING LOW OSMOLAR CONTRAST: ICD-10-PCS | Performed by: INTERNAL MEDICINE

## 2018-10-16 PROCEDURE — 37228 HC TRANSLUMINAL ANGIO TIBIAL PERONEAL UNILAT INIT: CPT

## 2018-10-16 PROCEDURE — 99153 MOD SED SAME PHYS/QHP EA: CPT

## 2018-10-16 PROCEDURE — 99152 MOD SED SAME PHYS/QHP 5/>YRS: CPT

## 2018-10-16 RX ORDER — MIDAZOLAM HYDROCHLORIDE 1 MG/ML
INJECTION INTRAMUSCULAR; INTRAVENOUS
Status: COMPLETED
Start: 2018-10-16 | End: 2018-10-16

## 2018-10-16 RX ORDER — LIDOCAINE HYDROCHLORIDE 10 MG/ML
INJECTION, SOLUTION EPIDURAL; INFILTRATION; INTRACAUDAL; PERINEURAL
Status: COMPLETED
Start: 2018-10-16 | End: 2018-10-16

## 2018-10-16 RX ORDER — HEPARIN SODIUM 5000 [USP'U]/ML
INJECTION, SOLUTION INTRAVENOUS; SUBCUTANEOUS
Status: COMPLETED
Start: 2018-10-16 | End: 2018-10-16

## 2018-10-16 RX ORDER — ASPIRIN 81 MG/1
TABLET, CHEWABLE ORAL
Status: COMPLETED
Start: 2018-10-16 | End: 2018-10-16

## 2018-10-16 RX ORDER — SODIUM CHLORIDE 9 MG/ML
INJECTION, SOLUTION INTRAVENOUS CONTINUOUS
Status: DISCONTINUED | OUTPATIENT
Start: 2018-10-16 | End: 2018-10-16

## 2018-10-16 RX ADMIN — SODIUM CHLORIDE: 9 INJECTION, SOLUTION INTRAVENOUS at 09:00:00

## 2018-10-16 RX ADMIN — ASPIRIN 324 MG: 81 TABLET, CHEWABLE ORAL at 09:17:00

## 2018-10-16 NOTE — PROGRESS NOTES
Rc'd pt from cath lab in stable condition. VSS. Angioseal to right groin is oozing scant amounts of bloody drainage. Manual pressure was held for 20mins with quick clot. Site is soft, clean and dry. No bleeding or hematoma.  Pt denies c/o pain or discomfort

## 2018-10-17 ENCOUNTER — PRIOR ORIGINAL RECORDS (OUTPATIENT)
Dept: OTHER | Age: 73
End: 2018-10-17

## 2018-10-17 NOTE — PROCEDURES
[facility]    PATIENT'S NAME: Kamari Snyder   ATTENDING PHYSICIAN: Lucrecia Khan M.D. OPERATING PHYSICIAN: Lucrecia Khan M.D.    PATIENT ACCOUNT#:   [de-identified]    LOCATION:  [address]  MEDICAL RECORD #:   [id number]       DATE proximally. It is calcified. There are multiple stenoses in the 30% to 40% range throughout the course of the SFA. The deep femoral is patent. The popliteal artery has an area of 50% stenosis proximally.   Behind the knee, there is essentially a subtota 60/40, and 80/80. After laser atherectomy, there was a channel open with residual stenosis greater than 50%. There was no suggestion of thrombus given the patient's acute symptoms. Next, a 3 x 20 mm balloon was used to dilate the popliteal segment.   Aft right external iliac. Angiography with runoff revealed a patent saphenous vein graft from the SFA to the popliteal, which was patent. Runoff to the right foot primarily consists of an anterior tibial.  The tibioperoneal trunk is patent.   The posterior ti

## 2018-10-30 ENCOUNTER — PRIOR ORIGINAL RECORDS (OUTPATIENT)
Dept: OTHER | Age: 73
End: 2018-10-30

## 2018-11-01 ENCOUNTER — APPOINTMENT (OUTPATIENT)
Dept: LAB | Age: 73
End: 2018-11-01
Attending: FAMILY MEDICINE
Payer: MEDICARE

## 2018-11-01 ENCOUNTER — TELEPHONE (OUTPATIENT)
Dept: FAMILY MEDICINE CLINIC | Facility: CLINIC | Age: 73
End: 2018-11-01

## 2018-11-01 DIAGNOSIS — R30.0 DYSURIA: Primary | ICD-10-CM

## 2018-11-01 DIAGNOSIS — R30.0 DYSURIA: ICD-10-CM

## 2018-11-01 PROCEDURE — 87186 SC STD MICRODIL/AGAR DIL: CPT

## 2018-11-01 PROCEDURE — 87086 URINE CULTURE/COLONY COUNT: CPT

## 2018-11-01 PROCEDURE — 81001 URINALYSIS AUTO W/SCOPE: CPT

## 2018-11-01 PROCEDURE — 87088 URINE BACTERIA CULTURE: CPT

## 2018-11-01 NOTE — TELEPHONE ENCOUNTER
Patient wants to know if LE could send an order over to the lab so that she can drop off a sample. She thinks she has a bladder infection. She has a collection cup. Please advise.

## 2018-11-04 ENCOUNTER — TELEPHONE (OUTPATIENT)
Dept: FAMILY MEDICINE CLINIC | Facility: CLINIC | Age: 73
End: 2018-11-04

## 2018-11-04 RX ORDER — SULFAMETHOXAZOLE AND TRIMETHOPRIM 400; 80 MG/1; MG/1
1 TABLET ORAL 2 TIMES DAILY
Qty: 10 TABLET | Refills: 0 | Status: SHIPPED | OUTPATIENT
Start: 2018-11-04 | End: 2018-11-09

## 2018-11-05 NOTE — H&P
History & Physical Examination    Patient Name: Manuel Mclain  MRN: [id number]  CSN: [id number]  YOB: 1945    Diagnosis: PAD    Present Illness: Pt with worsening of stable claudication with abnl CTA. No medications prior to admission. RCA, ramus   • ANGIOPLASTY (CORONARY)      LAD,RCA   • ANGIOPLASTY (CORONARY)  2014    Rt common fem arthroplasry, stent   • ANGIOPLASTY (CORONARY)  5/12/15    right popliteal   • BYPASS SURGERY     •      • CABG  2018   • CATARACT [x ] [x ]    NECK & BACK [x ] [x ]    HEART [x ] [ x]    LUNGS [x ] [x ]    ABDOMEN [ x] [x ]    UROGENITAL [ ] [ ]    EXTREMITIES [ ] [ ] See above   OTHER        Plan: LE angio and possible intervention  (x )  I have discussed the risks,  benefits, and a

## 2018-11-13 ENCOUNTER — APPOINTMENT (OUTPATIENT)
Dept: LAB | Age: 73
End: 2018-11-13
Attending: FAMILY MEDICINE
Payer: MEDICARE

## 2018-11-13 DIAGNOSIS — E11.3292 MILD NONPROLIFERATIVE DIABETIC RETINOPATHY OF LEFT EYE WITHOUT MACULAR EDEMA ASSOCIATED WITH TYPE 2 DIABETES MELLITUS (HCC): ICD-10-CM

## 2018-11-13 DIAGNOSIS — E78.5 DYSLIPIDEMIA: ICD-10-CM

## 2018-11-13 DIAGNOSIS — I25.810 CORONARY ARTERY DISEASE INVOLVING CORONARY BYPASS GRAFT OF NATIVE HEART WITHOUT ANGINA PECTORIS: ICD-10-CM

## 2018-11-13 DIAGNOSIS — E08.51 DIABETES MELLITUS DUE TO UNDERLYING CONDITION WITH DIABETIC PERIPHERAL ANGIOPATHY WITHOUT GANGRENE, WITHOUT LONG-TERM CURRENT USE OF INSULIN (HCC): ICD-10-CM

## 2018-11-13 PROCEDURE — 80053 COMPREHEN METABOLIC PANEL: CPT

## 2018-11-13 PROCEDURE — 36415 COLL VENOUS BLD VENIPUNCTURE: CPT

## 2018-11-13 PROCEDURE — 80061 LIPID PANEL: CPT

## 2018-11-13 PROCEDURE — 83036 HEMOGLOBIN GLYCOSYLATED A1C: CPT

## 2018-11-29 ENCOUNTER — PRIOR ORIGINAL RECORDS (OUTPATIENT)
Dept: OTHER | Age: 73
End: 2018-11-29

## 2018-12-01 ENCOUNTER — PRIOR ORIGINAL RECORDS (OUTPATIENT)
Dept: HEALTH INFORMATION MANAGEMENT | Facility: OTHER | Age: 73
End: 2018-12-01

## 2018-12-03 ENCOUNTER — HOSPITAL ENCOUNTER (OUTPATIENT)
Dept: MAMMOGRAPHY | Age: 73
Discharge: HOME | End: 2018-12-03
Attending: FAMILY MEDICINE
Payer: MEDICARE

## 2018-12-03 ENCOUNTER — HOSPITAL ENCOUNTER (OUTPATIENT)
Dept: BONE DENSITY | Age: 73
Discharge: HOME | End: 2018-12-03
Attending: FAMILY MEDICINE
Payer: MEDICARE

## 2018-12-03 DIAGNOSIS — Z78.0 ASYMPTOMATIC MENOPAUSE: ICD-10-CM

## 2018-12-03 DIAGNOSIS — Z13.820 SCREENING FOR OSTEOPOROSIS: ICD-10-CM

## 2018-12-03 DIAGNOSIS — Z12.31 ENCOUNTER FOR SCREENING MAMMOGRAM FOR HIGH-RISK PATIENT: ICD-10-CM

## 2018-12-03 PROCEDURE — 77080 DXA BONE DENSITY AXIAL: CPT | Performed by: FAMILY MEDICINE

## 2018-12-03 PROCEDURE — 77063 BREAST TOMOSYNTHESIS BI: CPT | Performed by: FAMILY MEDICINE

## 2018-12-03 PROCEDURE — 77067 SCR MAMMO BI INCL CAD: CPT | Performed by: FAMILY MEDICINE

## 2018-12-13 ENCOUNTER — OFFICE VISIT (OUTPATIENT)
Dept: FAMILY MEDICINE CLINIC | Facility: CLINIC | Age: 73
End: 2018-12-13
Payer: MEDICARE

## 2018-12-13 VITALS
HEIGHT: 61.5 IN | HEART RATE: 78 BPM | TEMPERATURE: 98 F | SYSTOLIC BLOOD PRESSURE: 134 MMHG | OXYGEN SATURATION: 98 % | RESPIRATION RATE: 16 BRPM | WEIGHT: 142.5 LBS | BODY MASS INDEX: 26.56 KG/M2 | DIASTOLIC BLOOD PRESSURE: 82 MMHG

## 2018-12-13 DIAGNOSIS — Z79.4 DIABETES MELLITUS DUE TO UNDERLYING CONDITION WITH DIABETIC PERIPHERAL ANGIOPATHY WITHOUT GANGRENE, WITH LONG-TERM CURRENT USE OF INSULIN (HCC): Primary | ICD-10-CM

## 2018-12-13 DIAGNOSIS — E08.51 DIABETES MELLITUS DUE TO UNDERLYING CONDITION WITH DIABETIC PERIPHERAL ANGIOPATHY WITHOUT GANGRENE, WITH LONG-TERM CURRENT USE OF INSULIN (HCC): Primary | ICD-10-CM

## 2018-12-13 PROCEDURE — 99214 OFFICE O/P EST MOD 30 MIN: CPT | Performed by: FAMILY MEDICINE

## 2018-12-13 NOTE — PROGRESS NOTES
HPI:   Jade Munroe is a 75year old female who presents for recheck of her diabetes, PAD  and HTN      Pt has a h/o neuropathy.      S/p another procedure for her PAD ; pt has been upset since and has not been taking great care of her DM   Pt also feels th 08/28/2017 1,274.5 (H) <=30.0 ug/mg Final   02/01/2017 229.5 (H) <=30.0 ug/mg Final         Current Outpatient Medications:  Turmeric (CURCUMIN 95) 500 MG Oral Cap Take 200 mg by mouth daily.  Disp:  Rfl:    furosemide 20 MG Oral Tab Take 20 mg by mouth d time.    • Heart disease    • High blood pressure    • High cholesterol    • History of blood transfusion     x2 transfusions last one about 2015 or so.    • HTN (hypertension) 7/11/2013   • Intermittent claudication (HCC)    • Neuropathy    • Osteoarthriti 1972        Years since quittin.5      Smokeless tobacco: Current User      Tobacco comment: Vaping cvd    Alcohol use: Yes      Alcohol/week: 0.0 oz      Comment: very rare    Drug use: Yes      Comment: CBD oil    Exercise: none.  Due to issues LIPID PANEL; Future  - COMP METABOLIC PANEL (14); Future  - HEMOGLOBIN A1C; Future  - MICROALB/CREAT RATIO, RANDOM URINE;  Future        Follow up on June AWV or sooner if needed

## 2018-12-19 ENCOUNTER — PRIOR ORIGINAL RECORDS (OUTPATIENT)
Dept: OTHER | Age: 73
End: 2018-12-19

## 2018-12-23 NOTE — HISTORICAL OFFICE NOTE
Alondra Living  [phone]  : 1945  ACCOUNT:  [id number]  PCP: Carolina Hurtado M.D. CARDIOLOGIST: Deion Cohen M.D. Hospital:  [facility]   Admitted:  2018  Discharged:  2018    DISCHARGE SUMMARY    DISCHARGE DIAGNOSES:    1.  Chest stop taking her Plavix on February 15. She will follow up with Dr. Donnie Anderson in 1 week.       JANELLE Dee/obdulia-Job ID: [id number]  D: 02/08/2018   T: 02/12/2018    ================================================================================= with stent to RCA-3/07, drug eluting stent, hypertension, myocardial infarction, peripheral angiogram, plasty ant tib, popliteal R 7/2015, proximal LAD 10/25/2016, PTCA/Stent, PTCA/Stent 2016, R common femoral stent/atherectomy 5/2014, ramus/LAD 1/2015, ra She underwent angiography earlier this year that revealed a mild stenosis in her lower extremity graft and patent stents. My main concern is that the patient stopped her Avapro and her Lopressor on her own. Pressures are high.   I have explained to her daily                                  10/06/16 Probiotic                       2 daily                                  10/06/16 Vision Vitamins                 daily                                    10/06/16 Vitamin E             400UNIT   one daily discussed with radiology - low pre-test prob of DVT, prelim read in ?.  Plan repeat addn imaging of CFV. had long discussion with patient - patient has a hx of dvt in RLE in past, was on coumadin and failed to comply no longer on.  makes sense patient has partial dvt.  no signs of dvt. Pt hd stable.  pt seems reliable after long discussion, understands risk benefits.  discussed with police who report pt likely to be seen by  tonight and released with another court date, pt can fu with [facility] in 1 week for repeat cellulitis check and u/s as needed.  Pt will need to be connected to primary care for follow up.  pt going to central booking [facility] scripts for lovenox and clindamycin, instructions given on how to take, informed next doses and nypd report patient to get rx filled in central booking medical unit.

## 2019-01-01 ENCOUNTER — EXTERNAL RECORD (OUTPATIENT)
Dept: HEALTH INFORMATION MANAGEMENT | Facility: OTHER | Age: 74
End: 2019-01-01

## 2019-01-04 ENCOUNTER — PRIOR ORIGINAL RECORDS (OUTPATIENT)
Dept: OTHER | Age: 74
End: 2019-01-04

## 2019-01-04 ENCOUNTER — MYAURORA ACCOUNT LINK (OUTPATIENT)
Dept: OTHER | Age: 74
End: 2019-01-04

## 2019-01-10 ENCOUNTER — MYAURORA ACCOUNT LINK (OUTPATIENT)
Dept: OTHER | Age: 74
End: 2019-01-10

## 2019-01-10 ENCOUNTER — PRIOR ORIGINAL RECORDS (OUTPATIENT)
Dept: OTHER | Age: 74
End: 2019-01-10

## 2019-01-10 ENCOUNTER — HOSPITAL ENCOUNTER (OUTPATIENT)
Dept: CARDIOLOGY CLINIC | Facility: HOSPITAL | Age: 74
Discharge: HOME | End: 2019-01-10
Attending: INTERNAL MEDICINE

## 2019-01-10 DIAGNOSIS — I65.29 STENOSIS OF CAROTID ARTERY, UNSPECIFIED LATERALITY: ICD-10-CM

## 2019-01-15 ENCOUNTER — PRIOR ORIGINAL RECORDS (OUTPATIENT)
Dept: OTHER | Age: 74
End: 2019-01-15

## 2019-02-14 ENCOUNTER — APPOINTMENT (OUTPATIENT)
Dept: LAB | Age: 74
End: 2019-02-14
Attending: FAMILY MEDICINE
Payer: MEDICARE

## 2019-02-14 DIAGNOSIS — Z79.4 DIABETES MELLITUS DUE TO UNDERLYING CONDITION WITH DIABETIC PERIPHERAL ANGIOPATHY WITHOUT GANGRENE, WITH LONG-TERM CURRENT USE OF INSULIN (HCC): ICD-10-CM

## 2019-02-14 DIAGNOSIS — E08.51 DIABETES MELLITUS DUE TO UNDERLYING CONDITION WITH DIABETIC PERIPHERAL ANGIOPATHY WITHOUT GANGRENE, WITH LONG-TERM CURRENT USE OF INSULIN (HCC): ICD-10-CM

## 2019-02-14 LAB
ALBUMIN SERPL-MCNC: 2.9 G/DL (ref 3.4–5)
ALBUMIN/GLOB SERPL: 1 {RATIO} (ref 1–2)
ALP LIVER SERPL-CCNC: 113 U/L (ref 55–142)
ALT SERPL-CCNC: 17 U/L (ref 13–56)
ANION GAP SERPL CALC-SCNC: 9 MMOL/L (ref 0–18)
AST SERPL-CCNC: 20 U/L (ref 15–37)
BILIRUB SERPL-MCNC: 0.6 MG/DL (ref 0.1–2)
BUN BLD-MCNC: 17 MG/DL (ref 7–18)
BUN/CREAT SERPL: 17.3 (ref 10–20)
CALCIUM BLD-MCNC: 9.3 MG/DL (ref 8.5–10.1)
CHLORIDE SERPL-SCNC: 108 MMOL/L (ref 98–107)
CHOLEST SMN-MCNC: 186 MG/DL (ref ?–200)
CO2 SERPL-SCNC: 25 MMOL/L (ref 21–32)
CREAT BLD-MCNC: 0.98 MG/DL (ref 0.55–1.02)
CREAT UR-SCNC: 49.1 MG/DL
EST. AVERAGE GLUCOSE BLD GHB EST-MCNC: 123 MG/DL (ref 68–126)
GLOBULIN PLAS-MCNC: 2.8 G/DL (ref 2.8–4.4)
GLUCOSE BLD-MCNC: 139 MG/DL (ref 70–99)
HBA1C MFR BLD HPLC: 5.9 % (ref ?–5.7)
HDLC SERPL-MCNC: 57 MG/DL (ref 40–59)
LDLC SERPL CALC-MCNC: 116 MG/DL (ref ?–100)
M PROTEIN MFR SERPL ELPH: 5.7 G/DL (ref 6.4–8.2)
MICROALBUMIN UR-MCNC: 313 MG/DL
MICROALBUMIN/CREAT 24H UR-RTO: 6374.7 UG/MG (ref ?–30)
NONHDLC SERPL-MCNC: 129 MG/DL (ref ?–130)
OSMOLALITY SERPL CALC.SUM OF ELEC: 298 MOSM/KG (ref 275–295)
POTASSIUM SERPL-SCNC: 5.1 MMOL/L (ref 3.5–5.1)
SODIUM SERPL-SCNC: 142 MMOL/L (ref 136–145)
TRIGL SERPL-MCNC: 63 MG/DL (ref 30–149)
VLDLC SERPL CALC-MCNC: 13 MG/DL (ref 0–30)

## 2019-02-14 PROCEDURE — 80053 COMPREHEN METABOLIC PANEL: CPT

## 2019-02-14 PROCEDURE — 82570 ASSAY OF URINE CREATININE: CPT

## 2019-02-14 PROCEDURE — 36415 COLL VENOUS BLD VENIPUNCTURE: CPT

## 2019-02-14 PROCEDURE — 82043 UR ALBUMIN QUANTITATIVE: CPT

## 2019-02-14 PROCEDURE — 80061 LIPID PANEL: CPT

## 2019-02-14 PROCEDURE — 83036 HEMOGLOBIN GLYCOSYLATED A1C: CPT

## 2019-02-20 ENCOUNTER — OFFICE VISIT (OUTPATIENT)
Dept: FAMILY MEDICINE CLINIC | Facility: CLINIC | Age: 74
End: 2019-02-20
Payer: MEDICARE

## 2019-02-20 DIAGNOSIS — Z79.4 TYPE 2 DIABETES MELLITUS WITH COMPLICATION, WITH LONG-TERM CURRENT USE OF INSULIN (HCC): Primary | ICD-10-CM

## 2019-02-20 DIAGNOSIS — R35.0 URINARY FREQUENCY: ICD-10-CM

## 2019-02-20 DIAGNOSIS — E11.8 TYPE 2 DIABETES MELLITUS WITH COMPLICATION, WITH LONG-TERM CURRENT USE OF INSULIN (HCC): Primary | ICD-10-CM

## 2019-02-20 DIAGNOSIS — I10 BENIGN ESSENTIAL HTN: ICD-10-CM

## 2019-02-20 LAB
BILIRUBIN: NEGATIVE
GLUCOSE (URINE DIPSTICK): NEGATIVE MG/DL
KETONES (URINE DIPSTICK): 40 MG/DL
MULTISTIX LOT#: NORMAL NUMERIC
NITRITE, URINE: POSITIVE
PH, URINE: 5.5 (ref 4.5–8)
PROTEIN (URINE DIPSTICK): 300 MG/DL
SPECIFIC GRAVITY: 1.02 (ref 1–1.03)
UROBILINOGEN,SEMI-QN: 0.2 MG/DL (ref 0–1.9)

## 2019-02-20 PROCEDURE — 81003 URINALYSIS AUTO W/O SCOPE: CPT | Performed by: FAMILY MEDICINE

## 2019-02-20 PROCEDURE — 87077 CULTURE AEROBIC IDENTIFY: CPT | Performed by: FAMILY MEDICINE

## 2019-02-20 PROCEDURE — 87086 URINE CULTURE/COLONY COUNT: CPT | Performed by: FAMILY MEDICINE

## 2019-02-20 PROCEDURE — 99214 OFFICE O/P EST MOD 30 MIN: CPT | Performed by: FAMILY MEDICINE

## 2019-02-20 PROCEDURE — 87186 SC STD MICRODIL/AGAR DIL: CPT | Performed by: FAMILY MEDICINE

## 2019-02-20 RX ORDER — SULFAMETHOXAZOLE AND TRIMETHOPRIM 400; 80 MG/1; MG/1
1 TABLET ORAL 2 TIMES DAILY
Qty: 10 TABLET | Refills: 0 | Status: SHIPPED | OUTPATIENT
Start: 2019-02-20 | End: 2019-03-26

## 2019-02-20 RX ORDER — METOPROLOL SUCCINATE 50 MG/1
50 TABLET, EXTENDED RELEASE ORAL DAILY
Qty: 30 TABLET | Refills: 0 | Status: SHIPPED | OUTPATIENT
Start: 2019-02-20 | End: 2019-05-24

## 2019-02-20 RX ORDER — EZETIMIBE 10 MG/1
10 TABLET ORAL
COMMUNITY
End: 2019-03-20 | Stop reason: ALTCHOICE

## 2019-02-20 NOTE — PROGRESS NOTES
HPI:   Joshua Donohue is a 75year old female who presents for recheck of her diabetes, PAD  and HTN      Pt has a h/o neuropathy.    Struggling with left leg swelling ; pt has no salt in her diet   Pt also had vein stripping due to her CABG     Patient’s FBS 1,391.0 (H) <=30.0 ug/mg Final   08/28/2017 1,274.5 (H) <=30.0 ug/mg Final         Current Outpatient Medications:  sulfamethoxazole-trimethoprim (BACTRIM) 400-80 MG Oral Tab Take 1 tablet by mouth 2 (two) times daily for 5 days.  Disp: 10 tablet Rfl: 0   M recently   • Elevated cholesterol    • Elevated hemoglobin A1c    • Fibromyalgia    • Heart attack ([facility] [address] 03/2008    stents x2 at that time.     • Heart disease    • High blood pressure    • High cholesterol    • History of blood transfusion     x2 transfusi Tobacco Use      Smoking status: Former Smoker        Packs/day: 2.50        Years: 9.00        Pack years: 22.5        Quit date: 1972        Years since quittin.7      Smokeless tobacco: Current User      Tobacco comment: Vaping cvd    Alcohol accuchecks and diabetic labwork as discussed. The patient indicates understanding of these issues and agrees to the plan. 1. Type 2 diabetes mellitus with complication, with long-term current use of insulin ([address]    - LIPID PANEL;  Future  - COMP METABO

## 2019-02-23 VITALS
DIASTOLIC BLOOD PRESSURE: 70 MMHG | WEIGHT: 140 LBS | HEIGHT: 61.5 IN | TEMPERATURE: 98 F | SYSTOLIC BLOOD PRESSURE: 148 MMHG | BODY MASS INDEX: 26.09 KG/M2 | RESPIRATION RATE: 16 BRPM | HEART RATE: 80 BPM

## 2019-02-28 VITALS
BODY MASS INDEX: 28.89 KG/M2 | SYSTOLIC BLOOD PRESSURE: 170 MMHG | HEART RATE: 74 BPM | DIASTOLIC BLOOD PRESSURE: 74 MMHG | HEIGHT: 62 IN | WEIGHT: 157 LBS

## 2019-02-28 VITALS — SYSTOLIC BLOOD PRESSURE: 170 MMHG | HEART RATE: 56 BPM | WEIGHT: 139 LBS | DIASTOLIC BLOOD PRESSURE: 60 MMHG

## 2019-02-28 VITALS
HEART RATE: 60 BPM | DIASTOLIC BLOOD PRESSURE: 62 MMHG | HEIGHT: 62 IN | SYSTOLIC BLOOD PRESSURE: 138 MMHG | WEIGHT: 138 LBS | BODY MASS INDEX: 25.4 KG/M2

## 2019-02-28 VITALS
BODY MASS INDEX: 25.58 KG/M2 | SYSTOLIC BLOOD PRESSURE: 144 MMHG | WEIGHT: 139 LBS | HEIGHT: 62 IN | HEART RATE: 68 BPM | DIASTOLIC BLOOD PRESSURE: 58 MMHG

## 2019-02-28 VITALS
HEART RATE: 64 BPM | DIASTOLIC BLOOD PRESSURE: 60 MMHG | SYSTOLIC BLOOD PRESSURE: 138 MMHG | WEIGHT: 155 LBS | HEIGHT: 62 IN | BODY MASS INDEX: 28.52 KG/M2

## 2019-02-28 VITALS
HEIGHT: 62 IN | SYSTOLIC BLOOD PRESSURE: 132 MMHG | DIASTOLIC BLOOD PRESSURE: 54 MMHG | WEIGHT: 137 LBS | BODY MASS INDEX: 25.21 KG/M2 | HEART RATE: 62 BPM

## 2019-02-28 VITALS
HEIGHT: 62 IN | SYSTOLIC BLOOD PRESSURE: 192 MMHG | WEIGHT: 144 LBS | DIASTOLIC BLOOD PRESSURE: 50 MMHG | BODY MASS INDEX: 26.5 KG/M2 | HEART RATE: 60 BPM

## 2019-02-28 VITALS
DIASTOLIC BLOOD PRESSURE: 50 MMHG | SYSTOLIC BLOOD PRESSURE: 156 MMHG | WEIGHT: 137 LBS | BODY MASS INDEX: 25.21 KG/M2 | HEART RATE: 65 BPM | HEIGHT: 62 IN

## 2019-02-28 VITALS
SYSTOLIC BLOOD PRESSURE: 180 MMHG | WEIGHT: 140 LBS | HEIGHT: 62 IN | BODY MASS INDEX: 25.76 KG/M2 | DIASTOLIC BLOOD PRESSURE: 60 MMHG | HEART RATE: 55 BPM

## 2019-03-01 VITALS
WEIGHT: 172 LBS | HEIGHT: 62 IN | BODY MASS INDEX: 31.65 KG/M2 | SYSTOLIC BLOOD PRESSURE: 142 MMHG | DIASTOLIC BLOOD PRESSURE: 74 MMHG | HEART RATE: 56 BPM

## 2019-03-01 VITALS
WEIGHT: 168 LBS | HEIGHT: 62 IN | HEART RATE: 66 BPM | BODY MASS INDEX: 30.91 KG/M2 | SYSTOLIC BLOOD PRESSURE: 135 MMHG | DIASTOLIC BLOOD PRESSURE: 70 MMHG

## 2019-03-01 VITALS
BODY MASS INDEX: 24.55 KG/M2 | SYSTOLIC BLOOD PRESSURE: 152 MMHG | WEIGHT: 162 LBS | RESPIRATION RATE: 16 BRPM | DIASTOLIC BLOOD PRESSURE: 68 MMHG | HEART RATE: 68 BPM | HEIGHT: 68 IN

## 2019-03-08 RX ORDER — CLOPIDOGREL BISULFATE 75 MG/1
75 TABLET ORAL DAILY
COMMUNITY
Start: 2017-12-12 | End: 2019-03-08 | Stop reason: SDUPTHER

## 2019-03-08 RX ORDER — CLOPIDOGREL BISULFATE 75 MG/1
75 TABLET ORAL DAILY
Qty: 90 TABLET | Refills: 1 | Status: SHIPPED | OUTPATIENT
Start: 2019-03-08 | End: 2019-07-15 | Stop reason: SDUPTHER

## 2019-03-13 ENCOUNTER — TELEPHONE (OUTPATIENT)
Dept: CARDIOLOGY | Age: 74
End: 2019-03-13

## 2019-03-15 RX ORDER — FUROSEMIDE 20 MG/1
TABLET ORAL
COMMUNITY
End: 2019-03-21 | Stop reason: SDUPTHER

## 2019-03-15 RX ORDER — VITAMIN B COMPLEX
TABLET ORAL
COMMUNITY
End: 2019-03-15 | Stop reason: CLARIF

## 2019-03-15 RX ORDER — VITAMIN K2 40 MCG
TABLET ORAL
COMMUNITY
End: 2019-03-21 | Stop reason: SDUPTHER

## 2019-03-15 RX ORDER — IRBESARTAN 150 MG/1
150 TABLET ORAL DAILY
COMMUNITY
End: 2019-03-21 | Stop reason: SDUPTHER

## 2019-03-15 RX ORDER — METOPROLOL SUCCINATE 25 MG/1
TABLET, EXTENDED RELEASE ORAL
COMMUNITY
End: 2019-03-21 | Stop reason: SDUPTHER

## 2019-03-15 RX ORDER — VIT C/B6/B5/MAGNESIUM/HERB 173 50-5-6-5MG
CAPSULE ORAL
COMMUNITY
End: 2019-03-21 | Stop reason: SDUPTHER

## 2019-03-15 RX ORDER — CEPHRADINE 500 MG
CAPSULE ORAL
COMMUNITY
End: 2019-03-21 | Stop reason: SDUPTHER

## 2019-03-15 RX ORDER — MULTIVIT WITH MINERALS/LUTEIN
TABLET ORAL
COMMUNITY
End: 2019-07-01

## 2019-03-20 ENCOUNTER — OFFICE VISIT (OUTPATIENT)
Dept: NEPHROLOGY | Facility: CLINIC | Age: 74
End: 2019-03-20
Payer: MEDICARE

## 2019-03-20 VITALS — WEIGHT: 152 LBS | DIASTOLIC BLOOD PRESSURE: 78 MMHG | BODY MASS INDEX: 28 KG/M2 | SYSTOLIC BLOOD PRESSURE: 162 MMHG

## 2019-03-20 DIAGNOSIS — R60.1 ANASARCA: ICD-10-CM

## 2019-03-20 DIAGNOSIS — N04.9 NEPHROTIC SYNDROME: ICD-10-CM

## 2019-03-20 DIAGNOSIS — R80.9 PROTEINURIA, UNSPECIFIED TYPE: Primary | ICD-10-CM

## 2019-03-20 PROCEDURE — 99214 OFFICE O/P EST MOD 30 MIN: CPT | Performed by: INTERNAL MEDICINE

## 2019-03-20 RX ORDER — BUMETANIDE 2 MG/1
2 TABLET ORAL 2 TIMES DAILY
Qty: 60 TABLET | Refills: 11 | Status: SHIPPED | OUTPATIENT
Start: 2019-03-20 | End: 2019-04-23

## 2019-03-20 NOTE — PROGRESS NOTES
Nephrology Progress Note      ASSESSMENT/PLAN:        1) Nephrotic syndrome- although she has long-standing sub-nephrotic range proteinuria consistent with diabetic nephropathy, she is recently developed relatively acute onset of marked lower extremity [place] stents x8 total   • Deep vein thrombosis (HCC)     right leg   • DIABETES    • Disorder of liver     enzymes were elevated recently   • Elevated cholesterol    • Elevated hemoglobin A1c    • Fibromyalgia    • Heart attack ([facility] [address] 03/2008    stents x2 at that 6/23/2010    Performed by Alejandro Oliva MD at [facility]   • TONSILLECTOMY        Family History   Problem Relation Age of Onset   • Heart Disorder Father    • Diabetes Mother    • Diabetes Brother    • Heart Disorder Brother       Social H capsules by mouth daily.  Disp:  Rfl:        Allergies:    Codeine [Opioid Corazon*    PAIN  Cortisone [Cortison*      Coumadin [Warfarin]         Comment:Pt says she developed sores  Gluten Flour            OTHER (SEE COMMENTS)    Comment:Pt feels gluten eleva

## 2019-03-21 RX ORDER — CEPHRADINE 500 MG
1000 CAPSULE ORAL 2 TIMES DAILY
COMMUNITY
Start: 2018-11-29 | End: 2019-03-22

## 2019-03-21 RX ORDER — BUMETANIDE 2 MG/1
2 TABLET ORAL 2 TIMES DAILY
COMMUNITY
Start: 2019-03-20 | End: 2019-07-01

## 2019-03-21 RX ORDER — HYDROCHLOROTHIAZIDE 50 MG/1
12.5 TABLET ORAL DAILY
COMMUNITY
End: 2019-07-01

## 2019-03-21 RX ORDER — EZETIMIBE 10 MG/1
10 TABLET ORAL DAILY
COMMUNITY
End: 2019-07-01

## 2019-03-21 RX ORDER — METOPROLOL SUCCINATE 50 MG/1
50 TABLET, EXTENDED RELEASE ORAL DAILY
COMMUNITY
End: 2020-01-01 | Stop reason: CLARIF

## 2019-03-21 RX ORDER — IRBESARTAN 150 MG/1
150 TABLET ORAL DAILY
COMMUNITY
Start: 2018-12-19 | End: 2019-03-22

## 2019-03-21 RX ORDER — VIT C/B6/B5/MAGNESIUM/HERB 173 50-5-6-5MG
500 CAPSULE ORAL DAILY
COMMUNITY
Start: 2018-08-15 | End: 2020-01-01 | Stop reason: CLARIF

## 2019-03-21 RX ORDER — MULTIVIT WITH MINERALS/LUTEIN
1000 TABLET ORAL DAILY
COMMUNITY
Start: 2018-11-29 | End: 2019-03-21 | Stop reason: SDUPTHER

## 2019-03-21 RX ORDER — FUROSEMIDE 20 MG/1
20 TABLET ORAL PRN
COMMUNITY
Start: 2018-09-07 | End: 2019-07-01

## 2019-03-21 RX ORDER — ASCORBIC ACID 125 MG
TABLET,CHEWABLE ORAL 2 TIMES DAILY
COMMUNITY
End: 2019-03-21 | Stop reason: SDUPTHER

## 2019-03-21 RX ORDER — VITAMIN K2 40 MCG
TABLET ORAL DAILY
COMMUNITY
Start: 2018-08-02 | End: 2019-07-01

## 2019-03-21 RX ORDER — CLOPIDOGREL BISULFATE 75 MG/1
75 TABLET ORAL DAILY
COMMUNITY
End: 2019-03-21 | Stop reason: SDUPTHER

## 2019-03-21 RX ORDER — BETA-CAROTENE(A)-VITS C,E/MINS
TABLET ORAL DAILY
COMMUNITY
Start: 2016-10-06 | End: 2019-03-21 | Stop reason: SDUPTHER

## 2019-03-22 ENCOUNTER — APPOINTMENT (OUTPATIENT)
Dept: CARDIOLOGY | Age: 74
End: 2019-03-22

## 2019-03-22 ENCOUNTER — OFFICE VISIT (OUTPATIENT)
Dept: CARDIOLOGY | Age: 74
End: 2019-03-22

## 2019-03-22 VITALS
HEART RATE: 56 BPM | HEIGHT: 62 IN | BODY MASS INDEX: 27.42 KG/M2 | WEIGHT: 149 LBS | DIASTOLIC BLOOD PRESSURE: 64 MMHG | SYSTOLIC BLOOD PRESSURE: 140 MMHG

## 2019-03-22 DIAGNOSIS — I25.10 CORONARY ARTERY DISEASE INVOLVING NATIVE CORONARY ARTERY WITHOUT ANGINA PECTORIS, UNSPECIFIED WHETHER NATIVE OR TRANSPLANTED HEART: ICD-10-CM

## 2019-03-22 DIAGNOSIS — I75.023 ATHEROEMBOLISM OF BOTH LOWER EXTREMITIES (CMD): ICD-10-CM

## 2019-03-22 DIAGNOSIS — I65.29 STENOSIS OF CAROTID ARTERY, UNSPECIFIED LATERALITY: ICD-10-CM

## 2019-03-22 DIAGNOSIS — I10 ESSENTIAL HYPERTENSION: ICD-10-CM

## 2019-03-22 DIAGNOSIS — I77.9 PERIPHERAL ARTERIAL OCCLUSIVE DISEASE (CMD): Primary | ICD-10-CM

## 2019-03-22 DIAGNOSIS — I21.9 MYOCARDIAL INFARCTION, UNSPECIFIED MI TYPE, UNSPECIFIED ARTERY (CMD): ICD-10-CM

## 2019-03-22 PROCEDURE — 99214 OFFICE O/P EST MOD 30 MIN: CPT | Performed by: INTERNAL MEDICINE

## 2019-03-22 SDOH — HEALTH STABILITY: PHYSICAL HEALTH: ON AVERAGE, HOW MANY DAYS PER WEEK DO YOU ENGAGE IN MODERATE TO STRENUOUS EXERCISE (LIKE A BRISK WALK)?: 0 DAYS

## 2019-03-22 SDOH — HEALTH STABILITY: PHYSICAL HEALTH: ON AVERAGE, HOW MANY MINUTES DO YOU ENGAGE IN EXERCISE AT THIS LEVEL?: 0 MIN

## 2019-03-22 SDOH — HEALTH STABILITY: MENTAL HEALTH: HOW OFTEN DO YOU HAVE A DRINK CONTAINING ALCOHOL?: NEVER

## 2019-03-25 ENCOUNTER — TELEPHONE (OUTPATIENT)
Dept: CARDIOLOGY | Age: 74
End: 2019-03-25

## 2019-03-26 ENCOUNTER — OFFICE VISIT (OUTPATIENT)
Dept: FAMILY MEDICINE CLINIC | Facility: CLINIC | Age: 74
End: 2019-03-26
Payer: MEDICARE

## 2019-03-26 VITALS
RESPIRATION RATE: 16 BRPM | BODY MASS INDEX: 26.28 KG/M2 | OXYGEN SATURATION: 98 % | DIASTOLIC BLOOD PRESSURE: 78 MMHG | HEIGHT: 61.5 IN | TEMPERATURE: 98 F | WEIGHT: 141 LBS | HEART RATE: 55 BPM | SYSTOLIC BLOOD PRESSURE: 138 MMHG

## 2019-03-26 DIAGNOSIS — R35.0 URINARY FREQUENCY: Primary | ICD-10-CM

## 2019-03-26 LAB
BILIRUBIN: NEGATIVE
GLUCOSE (URINE DIPSTICK): NEGATIVE MG/DL
KETONES (URINE DIPSTICK): NEGATIVE MG/DL
MULTISTIX LOT#: NORMAL NUMERIC
NITRITE, URINE: NEGATIVE
PH, URINE: 6.5 (ref 4.5–8)
PROTEIN (URINE DIPSTICK): 100 MG/DL
SPECIFIC GRAVITY: 1.02 (ref 1–1.03)
URINE-COLOR: YELLOW
UROBILINOGEN,SEMI-QN: 0.2 MG/DL (ref 0–1.9)

## 2019-03-26 PROCEDURE — 87186 SC STD MICRODIL/AGAR DIL: CPT | Performed by: FAMILY MEDICINE

## 2019-03-26 PROCEDURE — 99213 OFFICE O/P EST LOW 20 MIN: CPT | Performed by: FAMILY MEDICINE

## 2019-03-26 PROCEDURE — 81003 URINALYSIS AUTO W/O SCOPE: CPT | Performed by: FAMILY MEDICINE

## 2019-03-26 PROCEDURE — 87077 CULTURE AEROBIC IDENTIFY: CPT | Performed by: FAMILY MEDICINE

## 2019-03-26 PROCEDURE — 87086 URINE CULTURE/COLONY COUNT: CPT | Performed by: FAMILY MEDICINE

## 2019-03-26 RX ORDER — METOPROLOL SUCCINATE 25 MG/1
TABLET, EXTENDED RELEASE ORAL
Qty: 90 TABLET | Refills: 2 | Status: SHIPPED | OUTPATIENT
Start: 2019-03-26 | End: 2019-07-01

## 2019-03-26 RX ORDER — SULFAMETHOXAZOLE AND TRIMETHOPRIM 400; 80 MG/1; MG/1
1 TABLET ORAL 2 TIMES DAILY
Qty: 10 TABLET | Refills: 0 | Status: SHIPPED | OUTPATIENT
Start: 2019-03-26 | End: 2019-03-31

## 2019-03-26 RX ORDER — EZETIMIBE 10 MG/1
10 TABLET ORAL
COMMUNITY
End: 2019-04-23 | Stop reason: ALTCHOICE

## 2019-03-26 NOTE — PROGRESS NOTES
HPI:   Justina Villaseñor is a 75year old female who presents urinary symptoms   For several days pt has had some pelvic pressure and urine odor   No fever   No back pain   + urinary urgency and frequency     Seeing dr. Danny Spence ; will have kidney biopsy soon   Bet thrombosis ([facility]     right leg   • DIABETES    • Diabetes ([facility]    • Disorder of liver     enzymes were elevated recently   • Elevated cholesterol    • Elevated hemoglobin A1c    • Fibromyalgia    • Heart attack ([facility] 03/2008    stents x2 at that time. MD at [address] [place] [address]   • TONSILLECTOMY        Social History: Social History    Tobacco Use      Smoking status: Former Smoker        Packs/day: 2.50        Years: 9.00        Pack years: 22.5        Quit date: 5/23/1972        Years since [employer] needed

## 2019-04-03 ENCOUNTER — HOSPITAL ENCOUNTER (OUTPATIENT)
Dept: CT IMAGING | Facility: HOSPITAL | Age: 74
Discharge: HOME | End: 2019-04-03
Attending: INTERNAL MEDICINE
Payer: MEDICARE

## 2019-04-03 ENCOUNTER — APPOINTMENT (OUTPATIENT)
Dept: LAB | Facility: HOSPITAL | Age: 74
End: 2019-04-03
Attending: INTERNAL MEDICINE
Payer: MEDICARE

## 2019-04-03 VITALS
WEIGHT: 149 LBS | RESPIRATION RATE: 16 BRPM | DIASTOLIC BLOOD PRESSURE: 64 MMHG | TEMPERATURE: 98 F | HEART RATE: 57 BPM | OXYGEN SATURATION: 98 % | HEIGHT: 61.5 IN | SYSTOLIC BLOOD PRESSURE: 140 MMHG | BODY MASS INDEX: 27.77 KG/M2

## 2019-04-03 DIAGNOSIS — R60.1 ANASARCA: ICD-10-CM

## 2019-04-03 DIAGNOSIS — R80.9 PROTEINURIA, UNSPECIFIED TYPE: ICD-10-CM

## 2019-04-03 DIAGNOSIS — N04.9 NEPHROTIC SYNDROME: ICD-10-CM

## 2019-04-03 DIAGNOSIS — R80.9 PROTEINURIA, UNSPECIFIED TYPE: Primary | ICD-10-CM

## 2019-04-03 PROCEDURE — 88348 ELECTRON MICROSCOPY DX: CPT | Performed by: INTERNAL MEDICINE

## 2019-04-03 PROCEDURE — 77012 CT SCAN FOR NEEDLE BIOPSY: CPT | Performed by: INTERNAL MEDICINE

## 2019-04-03 PROCEDURE — 88313 SPECIAL STAINS GROUP 2: CPT | Performed by: INTERNAL MEDICINE

## 2019-04-03 PROCEDURE — 88346 IMFLUOR 1ST 1ANTB STAIN PX: CPT | Performed by: INTERNAL MEDICINE

## 2019-04-03 PROCEDURE — 88350 IMFLUOR EA ADDL 1ANTB STN PX: CPT | Performed by: INTERNAL MEDICINE

## 2019-04-03 PROCEDURE — 85027 COMPLETE CBC AUTOMATED: CPT

## 2019-04-03 PROCEDURE — 36415 COLL VENOUS BLD VENIPUNCTURE: CPT

## 2019-04-03 PROCEDURE — 50200 RENAL BIOPSY PERQ: CPT | Performed by: INTERNAL MEDICINE

## 2019-04-03 PROCEDURE — 88305 TISSUE EXAM BY PATHOLOGIST: CPT | Performed by: INTERNAL MEDICINE

## 2019-04-03 PROCEDURE — 85610 PROTHROMBIN TIME: CPT

## 2019-04-03 RX ORDER — HYDRALAZINE HYDROCHLORIDE 20 MG/ML
INJECTION INTRAMUSCULAR; INTRAVENOUS
Status: COMPLETED
Start: 2019-04-03 | End: 2019-04-03

## 2019-04-03 RX ORDER — NALOXONE HYDROCHLORIDE 0.4 MG/ML
80 INJECTION, SOLUTION INTRAMUSCULAR; INTRAVENOUS; SUBCUTANEOUS AS NEEDED
Status: DISCONTINUED | OUTPATIENT
Start: 2019-04-03 | End: 2019-04-05

## 2019-04-03 RX ORDER — FLUMAZENIL 0.1 MG/ML
0.2 INJECTION, SOLUTION INTRAVENOUS AS NEEDED
Status: DISCONTINUED | OUTPATIENT
Start: 2019-04-03 | End: 2019-04-05

## 2019-04-03 RX ORDER — SODIUM CHLORIDE 9 MG/ML
INJECTION, SOLUTION INTRAVENOUS CONTINUOUS
Status: DISCONTINUED | OUTPATIENT
Start: 2019-04-03 | End: 2019-04-05

## 2019-04-03 RX ORDER — MIDAZOLAM HYDROCHLORIDE 1 MG/ML
1 INJECTION INTRAMUSCULAR; INTRAVENOUS EVERY 5 MIN PRN
Status: ACTIVE | OUTPATIENT
Start: 2019-04-03 | End: 2019-04-03

## 2019-04-03 RX ADMIN — SODIUM CHLORIDE: 9 INJECTION, SOLUTION INTRAVENOUS at 11:14:00

## 2019-04-03 RX ADMIN — HYDRALAZINE HYDROCHLORIDE 5 MG: 20 INJECTION INTRAMUSCULAR; INTRAVENOUS at 11:14:00

## 2019-04-03 RX ADMIN — HYDRALAZINE HYDROCHLORIDE 5 MG: 20 INJECTION INTRAMUSCULAR; INTRAVENOUS at 11:17:00

## 2019-04-03 NOTE — PROCEDURES
[facility]  Procedure Note    Andra Ar PALOMINO [address]. Patient Status:  Outpatient    1945 MRN [id number]   [facility] CT Attending Ashley De La Torre MD   [facility] Day # 0 PCP Carolina Hurtado DO     Procedure: CT guided renal biopsy    Pre-Procedure

## 2019-04-03 NOTE — IMAGING NOTE
Pt into CT scan room 1 for renal biopsy per . Pt informed and consented per . Pt placed in supine position. Pt has high blood pressure. Pt given per protocol for high blood pressure. Pt at this point is refusing sedation.   Pt tolerat

## 2019-04-11 ENCOUNTER — TELEPHONE (OUTPATIENT)
Dept: NEPHROLOGY | Facility: CLINIC | Age: 74
End: 2019-04-11

## 2019-04-11 NOTE — TELEPHONE ENCOUNTER
D/w pt- renal biopsy shows pure diabetic nephropathy as cause of CKD and proteinuria; no glomerular disease- meseret villarreal

## 2019-04-23 ENCOUNTER — OFFICE VISIT (OUTPATIENT)
Dept: NEPHROLOGY | Facility: CLINIC | Age: 74
End: 2019-04-23
Payer: MEDICARE

## 2019-04-23 VITALS — BODY MASS INDEX: 25 KG/M2 | WEIGHT: 136 LBS | SYSTOLIC BLOOD PRESSURE: 130 MMHG | DIASTOLIC BLOOD PRESSURE: 62 MMHG

## 2019-04-23 DIAGNOSIS — N04.9 NEPHROTIC SYNDROME: ICD-10-CM

## 2019-04-23 DIAGNOSIS — I10 ESSENTIAL HYPERTENSION: ICD-10-CM

## 2019-04-23 DIAGNOSIS — N18.30 CKD (CHRONIC KIDNEY DISEASE) STAGE 3, GFR 30-59 ML/MIN (HCC): Primary | ICD-10-CM

## 2019-04-23 DIAGNOSIS — E13.21 DIABETIC NEPHROPATHY ASSOCIATED WITH OTHER SPECIFIED DIABETES MELLITUS (HCC): ICD-10-CM

## 2019-04-23 PROCEDURE — 99214 OFFICE O/P EST MOD 30 MIN: CPT | Performed by: INTERNAL MEDICINE

## 2019-04-23 RX ORDER — BUMETANIDE 2 MG/1
2 TABLET ORAL 2 TIMES DAILY
Qty: 60 TABLET | Refills: 5 | Status: ON HOLD | OUTPATIENT
Start: 2019-04-23 | End: 2019-06-22

## 2019-04-23 NOTE — PROGRESS NOTES
Nephrology Progress Note      ASSESSMENT/PLAN:        1) Nephrotic syndrome- due to biopsy-proven diabetic nephropathy with severe arteriolosclerosis, interstitial fibrosis, tubular atrophy and significant glomerulosclerosis (11/22); her serum creatinine d Elevated cholesterol    • Elevated hemoglobin A1c    • Fibromyalgia    • Heart attack ([facility] 03/2008    stents x2 at that time.     • Heart disease    • High blood pressure    • High cholesterol    • History of blood transfusion     x2 transfusions last one Father    • Diabetes Mother    • Diabetes Brother    • Heart Disorder Brother       Social History: Social History    Tobacco Use      Smoking status: Former Smoker        Packs/day: 2.50        Years: 9.00        Pack years: 22.5        Quit date: 5/23/19 OTHER (SEE COMMENTS)    Comment:Pt says she developed sores             Oral, coumadin necrosis             Pt says she developed sores             Pt says she developed sores             Pt says she developed sores  Acetaminophen-Codei*    PAIN, O extremities  Skin: Warm and dry, no rashes      Ruben Mckeon MD  4/23/2019  200 PM

## 2019-04-25 ENCOUNTER — TELEPHONE (OUTPATIENT)
Dept: NEPHROLOGY | Facility: CLINIC | Age: 74
End: 2019-04-25

## 2019-04-25 ENCOUNTER — TELEPHONE (OUTPATIENT)
Dept: FAMILY MEDICINE CLINIC | Facility: CLINIC | Age: 74
End: 2019-04-25

## 2019-04-25 ENCOUNTER — APPOINTMENT (OUTPATIENT)
Dept: LAB | Age: 74
End: 2019-04-25
Attending: FAMILY MEDICINE
Payer: MEDICARE

## 2019-04-25 DIAGNOSIS — Z79.4 TYPE 2 DIABETES MELLITUS WITH COMPLICATION, WITH LONG-TERM CURRENT USE OF INSULIN (HCC): ICD-10-CM

## 2019-04-25 DIAGNOSIS — Z79.4 TYPE 2 DIABETES MELLITUS WITH COMPLICATION, WITH LONG-TERM CURRENT USE OF INSULIN (HCC): Primary | ICD-10-CM

## 2019-04-25 DIAGNOSIS — N28.9 KIDNEY DISEASE: ICD-10-CM

## 2019-04-25 DIAGNOSIS — E11.8 TYPE 2 DIABETES MELLITUS WITH COMPLICATION, WITH LONG-TERM CURRENT USE OF INSULIN (HCC): Primary | ICD-10-CM

## 2019-04-25 DIAGNOSIS — E11.8 TYPE 2 DIABETES MELLITUS WITH COMPLICATION, WITH LONG-TERM CURRENT USE OF INSULIN (HCC): ICD-10-CM

## 2019-04-25 PROCEDURE — 36415 COLL VENOUS BLD VENIPUNCTURE: CPT

## 2019-04-25 PROCEDURE — 80048 BASIC METABOLIC PNL TOTAL CA: CPT

## 2019-04-25 PROCEDURE — 83735 ASSAY OF MAGNESIUM: CPT

## 2019-04-25 NOTE — TELEPHONE ENCOUNTER
Nicolás Amaya called and thinks she is having a reaction to her medication. She said she has had a headache for 2 days and she never gets them. She also stated that her blood sugar numbers are high.     She didn't want to see the PA

## 2019-04-25 NOTE — TELEPHONE ENCOUNTER
labwork ordered, pt notified. Explained to pt to contact pharmacy to go back to original .  Pt expressed understanding    also see note from Dr. Smiley Mendoza

## 2019-04-25 NOTE — TELEPHONE ENCOUNTER
Have pt talk to pharmacist about getting it from original  / send to different pharmacy ?    [id number] [address]ina for Marylene Fanning / Hilda Cueva

## 2019-04-25 NOTE — TELEPHONE ENCOUNTER
I spoke with pt today, she changed from Rey[place]ide to Lantana for her bumex and now c/o severe HA and also has elevated blood glucose as well. Not clear whether this is related to the change in medication preparation.   Bumex was started 3/21 and no issues un

## 2019-04-25 NOTE — TELEPHONE ENCOUNTER
Spoke to pt,  She states she called Dr. Nixon Foster office, but he is not available to Monday. Pt started on Bumetanide 2mg,  She states she had a round pill originally, but now it is oval, thinks  may have changed.       She wakes up at night wit

## 2019-04-29 ENCOUNTER — TELEPHONE (OUTPATIENT)
Dept: NEPHROLOGY | Facility: CLINIC | Age: 74
End: 2019-04-29

## 2019-04-29 DIAGNOSIS — R79.89 HIGH SERUM MAGNESIUM: Primary | ICD-10-CM

## 2019-04-29 DIAGNOSIS — E87.1 LOW SODIUM LEVELS: ICD-10-CM

## 2019-05-16 ENCOUNTER — OFFICE VISIT (OUTPATIENT)
Dept: FAMILY MEDICINE CLINIC | Facility: CLINIC | Age: 74
End: 2019-05-16
Payer: MEDICARE

## 2019-05-16 ENCOUNTER — LAB ENCOUNTER (OUTPATIENT)
Dept: LAB | Age: 74
End: 2019-05-16
Attending: FAMILY MEDICINE
Payer: MEDICARE

## 2019-05-16 VITALS
WEIGHT: 132 LBS | BODY MASS INDEX: 24.6 KG/M2 | HEIGHT: 61.5 IN | RESPIRATION RATE: 18 BRPM | TEMPERATURE: 98 F | OXYGEN SATURATION: 98 % | SYSTOLIC BLOOD PRESSURE: 118 MMHG | HEART RATE: 59 BPM | DIASTOLIC BLOOD PRESSURE: 64 MMHG

## 2019-05-16 DIAGNOSIS — E13.21 DIABETIC NEPHROPATHY ASSOCIATED WITH OTHER SPECIFIED DIABETES MELLITUS (HCC): ICD-10-CM

## 2019-05-16 DIAGNOSIS — L84 HEEL CALLUS: Primary | ICD-10-CM

## 2019-05-16 DIAGNOSIS — I10 ESSENTIAL HYPERTENSION: ICD-10-CM

## 2019-05-16 DIAGNOSIS — N04.9 NEPHROTIC SYNDROME: ICD-10-CM

## 2019-05-16 DIAGNOSIS — E87.1 LOW SODIUM LEVELS: ICD-10-CM

## 2019-05-16 DIAGNOSIS — Z79.4 TYPE 2 DIABETES MELLITUS WITH COMPLICATION, WITH LONG-TERM CURRENT USE OF INSULIN (HCC): ICD-10-CM

## 2019-05-16 DIAGNOSIS — R79.89 HIGH SERUM MAGNESIUM: ICD-10-CM

## 2019-05-16 DIAGNOSIS — N18.30 CKD (CHRONIC KIDNEY DISEASE) STAGE 3, GFR 30-59 ML/MIN (HCC): ICD-10-CM

## 2019-05-16 DIAGNOSIS — E11.8 TYPE 2 DIABETES MELLITUS WITH COMPLICATION, WITH LONG-TERM CURRENT USE OF INSULIN (HCC): ICD-10-CM

## 2019-05-16 PROCEDURE — 80053 COMPREHEN METABOLIC PANEL: CPT

## 2019-05-16 PROCEDURE — 87086 URINE CULTURE/COLONY COUNT: CPT

## 2019-05-16 PROCEDURE — 83036 HEMOGLOBIN GLYCOSYLATED A1C: CPT

## 2019-05-16 PROCEDURE — 36415 COLL VENOUS BLD VENIPUNCTURE: CPT

## 2019-05-16 PROCEDURE — 80061 LIPID PANEL: CPT

## 2019-05-16 PROCEDURE — 83735 ASSAY OF MAGNESIUM: CPT

## 2019-05-16 PROCEDURE — 81001 URINALYSIS AUTO W/SCOPE: CPT

## 2019-05-16 PROCEDURE — 82043 UR ALBUMIN QUANTITATIVE: CPT

## 2019-05-16 PROCEDURE — 82570 ASSAY OF URINE CREATININE: CPT

## 2019-05-16 PROCEDURE — 99212 OFFICE O/P EST SF 10 MIN: CPT | Performed by: PHYSICIAN ASSISTANT

## 2019-05-16 NOTE — PROGRESS NOTES
HPI:    Patient ID: Jade Munroe is a 75year old female. HPI   Patient with history of PVD and DM presents for evaluation of left heel pain for the last several days.  States she has dry, cracked heels and recently used a foot file/callus remover on the Take 2 capsules by mouth daily.  Disp:  Rfl:      Allergies:  Statins                 OTHER (SEE COMMENTS)    Comment:Feet turn black, muscle weakness in the legs             CLASS, feet turned black             Feet turn black, muscle weakness in the legs infection. Recommend avoiding foot file in the future. Instead use Cutemol cream or other emollient and cover with heel sock (with silicone lining) to allow for better absorption of cream into the skin. Continue to perform regular foot checks.  If symptoms

## 2019-05-17 ENCOUNTER — TELEPHONE (OUTPATIENT)
Dept: NEPHROLOGY | Facility: CLINIC | Age: 74
End: 2019-05-17

## 2019-05-18 ENCOUNTER — TELEPHONE (OUTPATIENT)
Dept: FAMILY MEDICINE CLINIC | Facility: CLINIC | Age: 74
End: 2019-05-18

## 2019-05-18 NOTE — TELEPHONE ENCOUNTER
D/w pt- proteinuria / renal function stable due to biopsy proven diabetic nephropathy- edema controlled on bumex 2 mg qd- thx phil

## 2019-05-18 NOTE — TELEPHONE ENCOUNTER
See previous message. Spoke to pt she states wound feels like it is burning,very painful, no improvement. Pt wondering if she should keep using silicone heel sock? Pt also feels like she needs ABX. Please advise.

## 2019-05-18 NOTE — TELEPHONE ENCOUNTER
Pt was to call Ondina back with progress. The pt said left foot is worse, and she needs an antibiotic that they had talked about.

## 2019-05-20 RX ORDER — CEPHALEXIN 500 MG/1
500 CAPSULE ORAL 3 TIMES DAILY
Qty: 21 CAPSULE | Refills: 0 | Status: SHIPPED | OUTPATIENT
Start: 2019-05-20 | End: 2019-05-27

## 2019-05-20 NOTE — TELEPHONE ENCOUNTER
Rx for Keflex 500 mg TID x 7 days sent to [facility]. Patient does not need to wear the silicone heel sock until the foot is healed. It is used more in preventing cracks in the heels. Please have the patient follow up in one week to reassess her foot.

## 2019-05-24 ENCOUNTER — TELEPHONE (OUTPATIENT)
Dept: FAMILY MEDICINE CLINIC | Facility: CLINIC | Age: 74
End: 2019-05-24

## 2019-05-24 DIAGNOSIS — I10 BENIGN ESSENTIAL HTN: ICD-10-CM

## 2019-05-24 RX ORDER — METOPROLOL SUCCINATE 50 MG/1
50 TABLET, EXTENDED RELEASE ORAL DAILY
Qty: 90 TABLET | Refills: 1 | Status: SHIPPED | OUTPATIENT
Start: 2019-05-24 | End: 2019-10-11

## 2019-05-28 ENCOUNTER — TELEPHONE (OUTPATIENT)
Dept: FAMILY MEDICINE CLINIC | Facility: CLINIC | Age: 74
End: 2019-05-28

## 2019-05-28 ENCOUNTER — OFFICE VISIT (OUTPATIENT)
Dept: FAMILY MEDICINE CLINIC | Facility: CLINIC | Age: 74
End: 2019-05-28
Payer: MEDICARE

## 2019-05-28 VITALS
HEART RATE: 65 BPM | WEIGHT: 132 LBS | HEIGHT: 61.5 IN | BODY MASS INDEX: 24.6 KG/M2 | OXYGEN SATURATION: 98 % | TEMPERATURE: 98 F | RESPIRATION RATE: 16 BRPM | SYSTOLIC BLOOD PRESSURE: 124 MMHG | DIASTOLIC BLOOD PRESSURE: 68 MMHG

## 2019-05-28 DIAGNOSIS — L03.116 CELLULITIS OF LEFT HEEL: Primary | ICD-10-CM

## 2019-05-28 DIAGNOSIS — L03.116 CELLULITIS OF LEFT HEEL: ICD-10-CM

## 2019-05-28 PROCEDURE — 99213 OFFICE O/P EST LOW 20 MIN: CPT | Performed by: FAMILY MEDICINE

## 2019-05-28 RX ORDER — CEPHALEXIN 500 MG/1
1000 CAPSULE ORAL 2 TIMES DAILY
Qty: 28 CAPSULE | Refills: 0 | Status: SHIPPED | OUTPATIENT
Start: 2019-05-28 | End: 2019-05-28

## 2019-05-28 RX ORDER — CEPHALEXIN 500 MG/1
1000 CAPSULE ORAL 2 TIMES DAILY
Qty: 28 CAPSULE | Refills: 0 | Status: SHIPPED | OUTPATIENT
Start: 2019-05-28 | End: 2019-06-04

## 2019-05-28 NOTE — TELEPHONE ENCOUNTER
PT STATES THAT SHE WAS HERE TODAY AND HER MEDS ARE STILL NOT CALLED TO  [address]. PLS CALL IN ASAP AS THEY ARE GOING TO A WAKE AND WANT TO .

## 2019-05-28 NOTE — PROGRESS NOTES
HPI:   Diony Beal is a 75year old female who presents for left heel cellulitis follow up     Pt just finished keflex  Pt still having pain at site  Occurred after trying to reduce dry skin   No fever  PAD - sees cards       Current Outpatient Medication transfusion     x2 transfusions last one about 2015 or so.    • HTN (hypertension) 7/11/2013   • Intermittent claudication [facility])    • Neuropathy    • Osteoarthritis    • Peripheral vascular disease (HCC)     stents and bypass right leg, 2/2015   • Proteinur Alcohol use: Yes      Alcohol/week: 0.0 oz      Comment: very rare    Drug use: Yes      Comment: CBD oil, vape    Exercise: minimal  Diet: watches sugar closely- .       REVIEW OF SYSTEMS:   GENERAL: feels well otherwise  SKIN: denies any unusual skin lesi

## 2019-06-08 ENCOUNTER — HOSPITAL ENCOUNTER (INPATIENT)
Facility: HOSPITAL | Age: 74
LOS: 1 days | Discharge: HOME | DRG: 641 | End: 2019-06-09
Attending: EMERGENCY MEDICINE | Admitting: HOSPITALIST
Payer: MEDICARE

## 2019-06-08 DIAGNOSIS — E87.1 HYPONATREMIA: ICD-10-CM

## 2019-06-08 DIAGNOSIS — R55 SYNCOPE, UNSPECIFIED SYNCOPE TYPE: Primary | ICD-10-CM

## 2019-06-08 DIAGNOSIS — D72.829 LEUKOCYTOSIS, UNSPECIFIED TYPE: ICD-10-CM

## 2019-06-08 DIAGNOSIS — E87.6 HYPOKALEMIA: ICD-10-CM

## 2019-06-08 DIAGNOSIS — E11.8 TYPE 2 DIABETES MELLITUS WITH COMPLICATION, WITH LONG-TERM CURRENT USE OF INSULIN (HCC): ICD-10-CM

## 2019-06-08 DIAGNOSIS — Z79.4 TYPE 2 DIABETES MELLITUS WITH COMPLICATION, WITH LONG-TERM CURRENT USE OF INSULIN (HCC): ICD-10-CM

## 2019-06-08 DIAGNOSIS — I25.810 CORONARY ARTERY DISEASE INVOLVING CORONARY BYPASS GRAFT OF NATIVE HEART WITHOUT ANGINA PECTORIS: ICD-10-CM

## 2019-06-09 ENCOUNTER — APPOINTMENT (OUTPATIENT)
Dept: CT IMAGING | Facility: HOSPITAL | Age: 74
DRG: 641 | End: 2019-06-09
Attending: HOSPITALIST
Payer: MEDICARE

## 2019-06-09 VITALS
RESPIRATION RATE: 18 BRPM | SYSTOLIC BLOOD PRESSURE: 148 MMHG | OXYGEN SATURATION: 100 % | HEART RATE: 65 BPM | TEMPERATURE: 98 F | DIASTOLIC BLOOD PRESSURE: 43 MMHG

## 2019-06-09 PROBLEM — D72.829 LEUKOCYTOSIS, UNSPECIFIED TYPE: Status: ACTIVE | Noted: 2019-06-09

## 2019-06-09 PROBLEM — R55 SYNCOPE: Status: ACTIVE | Noted: 2019-06-09

## 2019-06-09 PROBLEM — R55 SYNCOPE, UNSPECIFIED SYNCOPE TYPE: Status: ACTIVE | Noted: 2019-06-09

## 2019-06-09 PROBLEM — E87.6 HYPOKALEMIA: Status: ACTIVE | Noted: 2019-06-09

## 2019-06-09 PROCEDURE — 70450 CT HEAD/BRAIN W/O DYE: CPT | Performed by: HOSPITALIST

## 2019-06-09 PROCEDURE — 99223 1ST HOSP IP/OBS HIGH 75: CPT | Performed by: HOSPITALIST

## 2019-06-09 PROCEDURE — 99222 1ST HOSP IP/OBS MODERATE 55: CPT | Performed by: INTERNAL MEDICINE

## 2019-06-09 PROCEDURE — 99232 SBSQ HOSP IP/OBS MODERATE 35: CPT | Performed by: CLINICAL NURSE SPECIALIST

## 2019-06-09 RX ORDER — SODIUM CHLORIDE 9 MG/ML
1000 INJECTION, SOLUTION INTRAVENOUS ONCE
Status: COMPLETED | OUTPATIENT
Start: 2019-06-09 | End: 2019-06-09

## 2019-06-09 RX ORDER — METOPROLOL SUCCINATE 50 MG/1
50 TABLET, EXTENDED RELEASE ORAL
Status: DISCONTINUED | OUTPATIENT
Start: 2019-06-09 | End: 2019-06-09

## 2019-06-09 RX ORDER — DEXTROSE MONOHYDRATE 25 G/50ML
50 INJECTION, SOLUTION INTRAVENOUS
Status: DISCONTINUED | OUTPATIENT
Start: 2019-06-09 | End: 2019-06-09

## 2019-06-09 RX ORDER — CLOPIDOGREL BISULFATE 75 MG/1
75 TABLET ORAL DAILY
Status: DISCONTINUED | OUTPATIENT
Start: 2019-06-09 | End: 2019-06-09

## 2019-06-09 RX ORDER — ASPIRIN 81 MG/1
324 TABLET, CHEWABLE ORAL ONCE
Status: COMPLETED | OUTPATIENT
Start: 2019-06-09 | End: 2019-06-09

## 2019-06-09 RX ORDER — ACETAMINOPHEN 325 MG/1
650 TABLET ORAL EVERY 6 HOURS PRN
Status: DISCONTINUED | OUTPATIENT
Start: 2019-06-09 | End: 2019-06-09

## 2019-06-09 RX ORDER — POTASSIUM CHLORIDE 20 MEQ/1
40 TABLET, EXTENDED RELEASE ORAL ONCE
Status: COMPLETED | OUTPATIENT
Start: 2019-06-09 | End: 2019-06-09

## 2019-06-09 RX ORDER — SODIUM CHLORIDE 9 MG/ML
INJECTION, SOLUTION INTRAVENOUS CONTINUOUS
Status: ACTIVE | OUTPATIENT
Start: 2019-06-09 | End: 2019-06-09

## 2019-06-09 RX ORDER — OMEGA-3-ACID ETHYL ESTERS 1 G/1
1 CAPSULE, LIQUID FILLED ORAL DAILY
Status: DISCONTINUED | OUTPATIENT
Start: 2019-06-09 | End: 2019-06-09

## 2019-06-09 RX ORDER — ENOXAPARIN SODIUM 100 MG/ML
40 INJECTION SUBCUTANEOUS DAILY
Status: DISCONTINUED | OUTPATIENT
Start: 2019-06-09 | End: 2019-06-09

## 2019-06-09 RX ORDER — SODIUM CHLORIDE 9 MG/ML
INJECTION, SOLUTION INTRAVENOUS CONTINUOUS
Status: DISCONTINUED | OUTPATIENT
Start: 2019-06-09 | End: 2019-06-09

## 2019-06-09 NOTE — PROGRESS NOTES
06/09/19 1320 06/09/19 1330 06/09/19 1332   Vital Signs   Pulse 66 63 65   BP (!) 169/50 154/48 148/43

## 2019-06-09 NOTE — H&P
[place] [facility]  History and Physical     Buddy Civil Rogus Patient Status:  Emergency    1945 MRN [id number]   Location [address] [facility] Attending Analia Esquivel MD   Hosp Day # 0 PCP Gail Doyle DO     Chief Complaint: Neuropathy    • Osteoarthritis    • Peripheral vascular disease (HCC)     stents and bypass right leg, 2/2015   • Proteinuria    • Sleep apnea     CPAP   • Unspecified essential hypertension    • Visual impairment     scar on left retina; glasses        Pa Brother         Allergies:   Statins                 OTHER (SEE COMMENTS)    Comment:Feet turn black, muscle weakness in the legs             CLASS, feet turned black             Feet turn black, muscle weakness in the legs  Warfarin                OTHER ( Oral Tab Take 1 tablet (75 mg total) by mouth daily. Disp: 30 tablet Rfl: 11   Specialty Vitamins Products (INULOSE BLOOD SUGAR SUPPORT) Oral Cap Take 1 capsule by mouth 2 (two) times daily.  Disp:  Rfl:    Glucose Blood (BERENICE CONTOUR NEXT TEST) In Vitro S Imaging data reviewed in Epic. ASSESSMENT / PLAN:     1. Syncope, etiology unclear  2. CAD sp PCI   3. Heart failure, chronic  4. Dyslipidemia  5. PVD  1. Admit  2. IVF  3. Plavix, Toprol  4. Check trop, mag, TSH  5. Check CT head  6. Telemetry  7.  Ch

## 2019-06-09 NOTE — PROGRESS NOTES
[facility] Cardiology Progress Note    Subjective:  Feels so much better, at her baseline. Follows regularly with Dr. Dino Mckeon.      Objective:  /49 (BP Location: Right arm)   Pulse 71   Temp 97.8 °F (36.6 °C) (Oral)   Resp 16   SpO2 100%     Telemetry: SR

## 2019-06-09 NOTE — PROGRESS NOTES
Pt discharged home via wheelchair accompanied by her  with all her belongings   Discharge instructions discussed with patient she verbalized understanding   Pt ambulated around unit no complaints of dizziness   vss at time of discharge   Iv and tele

## 2019-06-09 NOTE — PROGRESS NOTES
NURSING ADMISSION NOTE      Patient admitted via cart. Oriented to room. Safety precautions initiated. Bed in low position. Call light in reach. Pt admitted from ED to room 1098. Pt a/o x 4.   Denies any cp,lightheadness or dizziness upon arrival.

## 2019-06-09 NOTE — ED INITIAL ASSESSMENT (HPI)
Pt here with c/o multiple syncopal episodes tonight, patient was noted to be orthostatic per medics. Patient denies any recent illness. Denies any ROBERT or CP. Breathing unlabored, even, regular, with no signs of respiratory distress.  Patient noted to be pal

## 2019-06-09 NOTE — CONSULTS
[facility]  Electrophysiology Initial Consult Note      Natasha Barger Patient Status:  Inpatient    1945 MRN [id number]   [facility] 7NE-A Attending Prince Rowley MD   Hosp Day # 0 PCP Malon Crigler, DO     Reason History of blood transfusion     x2 transfusions last one about 2015 or so.    • HTN (hypertension) 7/11/2013   • Intermittent claudication (HCC)    • Neuropathy    • Osteoarthritis    • Peripheral vascular disease (HCC)     stents and bypass right leg, 2/2 history. She uses smokeless tobacco. She reports that she drinks alcohol. She reports that she has current or past drug history.     Allergies:    Statins                 OTHER (SEE COMMENTS)    Comment:Feet turn black, muscle weakness in the legs **OR** Glucose-Vitamin C (DEX-4) 4-6 GM-MG chewable tab 4 tablet, 4 tablet, Oral, Q15 Min PRN **OR** dextrose 50 % injection 50 mL, 50 mL, Intravenous, Q15 Min PRN **OR** glucose (DEX4) oral liquid 30 g, 30 g, Oral, Q15 Min PRN **OR** Glucose-Vitamin C (DE home diuretic who is admitted with syncope.     1) Syncope  - orthostatics vital signs are c/w with hypovolemia  - continue telemetry  - hold diuretic for now  - IVF    2) CAD  - s/p PCI and ultimately CABG  - continue plavix  - intolerance to statins  - co unspecified type          [facility]

## 2019-06-09 NOTE — ED PROVIDER NOTES
Patient Seen in: [facility] Emergency Department    History   Patient presents with:  Syncope (cardiovascular, neurologic)    Stated Complaint: SYNCOPE, orthostatic     HPI    58-year-old female with a history of coronary artery disease and congesti Osteoarthritis    • Peripheral vascular disease ([facility]     stents and bypass right leg, 2/2015   • Proteinuria    • Sleep apnea     CPAP   • Unspecified essential hypertension    • Visual impairment     scar on left retina; glasses       Past Surgical Histo complaint: SYNCOPE, orthostatic   Other systems are as noted in HPI. Constitutional and vital signs reviewed. All other systems reviewed and negative except as noted above.     Physical Exam     ED Triage Vitals   BP 06/08/19 2318 148/47   Pulse 06/08 PLATELET.   Procedure                               Abnormality         Status                     ---------                               -----------         ------                     CBC W/ DIFFERENTIAL[[id number]]          Abnormal            Final resul Present on Admission  Date Reviewed: 5/28/2019          ICD-10-CM Noted POA    Syncope R55 6/9/2019 Unknown

## 2019-06-10 ENCOUNTER — PATIENT OUTREACH (OUTPATIENT)
Dept: CASE MANAGEMENT | Age: 74
End: 2019-06-10

## 2019-06-10 DIAGNOSIS — Z02.9 ENCOUNTERS FOR UNSPECIFIED ADMINISTRATIVE PURPOSE: ICD-10-CM

## 2019-06-11 ENCOUNTER — TELEPHONE (OUTPATIENT)
Dept: FAMILY MEDICINE CLINIC | Facility: CLINIC | Age: 74
End: 2019-06-11

## 2019-06-11 NOTE — PROGRESS NOTES
Initial Post Discharge Follow Up   Discharge Date: 6/9/19  Contact Date: 6/10/2019    Consent Verification:  Assessment Completed With: Patient  HIPAA Verified?   Yes    Discharge Dx:   syncope    Was TCC ordered: no    General:   • How have you been sin tablet (2 mg total) by mouth 2 (two) times daily. Disp: 60 tablet Rfl: 5   Turmeric (CURCUMIN 95) 500 MG Oral Cap Take by mouth daily. Disp:  Rfl:    Omega-3-acid Ethyl Esters 1 g Oral Cap Take 1 g by mouth daily.  Disp:  Rfl:    acetaminophen 500 MG Oral Also, bring all your Medications.           [address], [address] [place] [address], [address] [address] [phone]          PCP TCM/HFU appointment: scheduled at D/C within 7-14 day

## 2019-06-11 NOTE — TELEPHONE ENCOUNTER
NCM unable to reach pt for TCM. Pt is high risk for readmission and would benefit from a TCM HFU by recommended appt date of 6/16/19. Pt has a Medicare AWV on 6/12/19 12:00pm with PCP. Please advise PCP and if ok to change to a TCM HFU or if to reschedule as pt would greatly benefit. Thank you!

## 2019-06-12 ENCOUNTER — OFFICE VISIT (OUTPATIENT)
Dept: FAMILY MEDICINE CLINIC | Facility: CLINIC | Age: 74
End: 2019-06-12
Payer: MEDICARE

## 2019-06-12 VITALS
RESPIRATION RATE: 16 BRPM | HEART RATE: 61 BPM | BODY MASS INDEX: 23.86 KG/M2 | DIASTOLIC BLOOD PRESSURE: 64 MMHG | WEIGHT: 128 LBS | TEMPERATURE: 98 F | SYSTOLIC BLOOD PRESSURE: 130 MMHG | OXYGEN SATURATION: 98 % | HEIGHT: 61.5 IN

## 2019-06-12 DIAGNOSIS — J44.9 CHRONIC OBSTRUCTIVE PULMONARY DISEASE, UNSPECIFIED COPD TYPE (HCC): ICD-10-CM

## 2019-06-12 DIAGNOSIS — M79.7 FIBROMYALGIA: ICD-10-CM

## 2019-06-12 DIAGNOSIS — R39.9 URINARY SYMPTOM OR SIGN: ICD-10-CM

## 2019-06-12 DIAGNOSIS — E78.5 DYSLIPIDEMIA: ICD-10-CM

## 2019-06-12 DIAGNOSIS — E87.6 HYPOKALEMIA: ICD-10-CM

## 2019-06-12 DIAGNOSIS — Z79.4 DIABETES MELLITUS DUE TO UNDERLYING CONDITION WITH DIABETIC PERIPHERAL ANGIOPATHY WITHOUT GANGRENE, WITH LONG-TERM CURRENT USE OF INSULIN (HCC): ICD-10-CM

## 2019-06-12 DIAGNOSIS — I25.810 CORONARY ARTERY DISEASE INVOLVING CORONARY BYPASS GRAFT OF NATIVE HEART WITHOUT ANGINA PECTORIS: ICD-10-CM

## 2019-06-12 DIAGNOSIS — I77.9 PAOD (PERIPHERAL ARTERIAL OCCLUSIVE DISEASE) (HCC): ICD-10-CM

## 2019-06-12 DIAGNOSIS — E11.3292 MILD NONPROLIFERATIVE DIABETIC RETINOPATHY OF LEFT EYE WITHOUT MACULAR EDEMA ASSOCIATED WITH TYPE 2 DIABETES MELLITUS (HCC): ICD-10-CM

## 2019-06-12 DIAGNOSIS — Z99.89 OSA ON CPAP: ICD-10-CM

## 2019-06-12 DIAGNOSIS — F32.0 MILD SINGLE CURRENT EPISODE OF MAJOR DEPRESSIVE DISORDER (HCC): ICD-10-CM

## 2019-06-12 DIAGNOSIS — I73.9 PERIPHERAL VASCULAR DISEASE OF LOWER EXTREMITY (HCC): ICD-10-CM

## 2019-06-12 DIAGNOSIS — N18.30 CHRONIC RENAL DISEASE, STAGE 3, MODERATELY DECREASED GLOMERULAR FILTRATION RATE (GFR) BETWEEN 30-59 ML/MIN/1.73 SQUARE METER (HCC): ICD-10-CM

## 2019-06-12 DIAGNOSIS — I50.1 PULMONARY EDEMA CARDIAC CAUSE (HCC): ICD-10-CM

## 2019-06-12 DIAGNOSIS — I10 BENIGN ESSENTIAL HTN: ICD-10-CM

## 2019-06-12 DIAGNOSIS — G47.33 OSA ON CPAP: ICD-10-CM

## 2019-06-12 DIAGNOSIS — H31.012: ICD-10-CM

## 2019-06-12 DIAGNOSIS — Z91.81 AT RISK FOR FALLING: ICD-10-CM

## 2019-06-12 DIAGNOSIS — Z00.00 ENCOUNTER FOR ANNUAL HEALTH EXAMINATION: Primary | ICD-10-CM

## 2019-06-12 DIAGNOSIS — Z79.4 TYPE 2 DIABETES MELLITUS WITH COMPLICATION, WITH LONG-TERM CURRENT USE OF INSULIN (HCC): ICD-10-CM

## 2019-06-12 DIAGNOSIS — E08.42 DIABETIC POLYNEUROPATHY ASSOCIATED WITH DIABETES MELLITUS DUE TO UNDERLYING CONDITION (HCC): ICD-10-CM

## 2019-06-12 DIAGNOSIS — R55 SYNCOPE, UNSPECIFIED SYNCOPE TYPE: ICD-10-CM

## 2019-06-12 DIAGNOSIS — G89.4 CHRONIC PAIN SYNDROME: ICD-10-CM

## 2019-06-12 DIAGNOSIS — Z13.31 DEPRESSION SCREENING: ICD-10-CM

## 2019-06-12 DIAGNOSIS — E87.1 HYPONATREMIA: ICD-10-CM

## 2019-06-12 DIAGNOSIS — M43.16 SPONDYLOLISTHESIS OF LUMBAR REGION: ICD-10-CM

## 2019-06-12 DIAGNOSIS — Z95.1 S/P CABG X 3: ICD-10-CM

## 2019-06-12 DIAGNOSIS — D72.829 LEUKOCYTOSIS, UNSPECIFIED TYPE: ICD-10-CM

## 2019-06-12 DIAGNOSIS — E08.51 DIABETES MELLITUS DUE TO UNDERLYING CONDITION WITH DIABETIC PERIPHERAL ANGIOPATHY WITHOUT GANGRENE, WITH LONG-TERM CURRENT USE OF INSULIN (HCC): ICD-10-CM

## 2019-06-12 DIAGNOSIS — M48.062 SPINAL STENOSIS OF LUMBAR REGION WITH NEUROGENIC CLAUDICATION: ICD-10-CM

## 2019-06-12 DIAGNOSIS — I73.9 CLAUDICATION (HCC): ICD-10-CM

## 2019-06-12 DIAGNOSIS — Z98.890 H/O PERIPHERAL ARTERY BYPASS: ICD-10-CM

## 2019-06-12 DIAGNOSIS — E11.8 TYPE 2 DIABETES MELLITUS WITH COMPLICATION, WITH LONG-TERM CURRENT USE OF INSULIN (HCC): ICD-10-CM

## 2019-06-12 PROBLEM — H43.812 PVD (POSTERIOR VITREOUS DETACHMENT), LEFT: Status: RESOLVED | Noted: 2017-02-10 | Resolved: 2019-06-12

## 2019-06-12 PROBLEM — I50.31 ACUTE DIASTOLIC CHF (CONGESTIVE HEART FAILURE) (HCC): Status: RESOLVED | Noted: 2018-02-04 | Resolved: 2019-06-12

## 2019-06-12 PROCEDURE — 87086 URINE CULTURE/COLONY COUNT: CPT | Performed by: FAMILY MEDICINE

## 2019-06-12 PROCEDURE — 87186 SC STD MICRODIL/AGAR DIL: CPT | Performed by: FAMILY MEDICINE

## 2019-06-12 PROCEDURE — G0439 PPPS, SUBSEQ VISIT: HCPCS | Performed by: FAMILY MEDICINE

## 2019-06-12 PROCEDURE — 99214 OFFICE O/P EST MOD 30 MIN: CPT | Performed by: FAMILY MEDICINE

## 2019-06-12 PROCEDURE — 81003 URINALYSIS AUTO W/O SCOPE: CPT | Performed by: FAMILY MEDICINE

## 2019-06-12 PROCEDURE — 87077 CULTURE AEROBIC IDENTIFY: CPT | Performed by: FAMILY MEDICINE

## 2019-06-12 PROCEDURE — G0444 DEPRESSION SCREEN ANNUAL: HCPCS | Performed by: FAMILY MEDICINE

## 2019-06-12 NOTE — PATIENT INSTRUCTIONS
Anne Barger's SCREENING SCHEDULE   Tests on this list are recommended by your physician but may not be covered, or covered at this frequency, by your insurer. Please check with your insurance carrier before scheduling to verify coverage.    PREVENTATIVE date06/08/2019 Routine EKG is not a screening covered service except at the [place] to Medicare Visit    Abdominal aortic aneurysm screening (once between ages 73-68) IPPE only No results found for this or any previous visit.  Limited to patients who meet o Annually if high risk No results found for: CHLAMYDIA No flowsheet data found. Screening Mammogram      Mammogram    Recommend Annually to at least age 76, and as needed after 76 There are no preventive care reminders to display for this patient.  [place] has a lot of good information including definitions of the different types of Advance Directives.  It also has the State forms available on it's website for anyone to review and print using their home computer and printer. (the forms are also available in S

## 2019-06-12 NOTE — PROGRESS NOTES
HPI:   Yves Contreras is a 75year old female who presents for a Medicare Subsequent Annual Wellness visit (Pt already had Initial Annual Wellness).     Pt also here to follow up on     PAOD (peripheral arterial occlusive disease) ([facility]     Extremity athero is instructed to get our office a copy of it for scanning into Epic.            She currently smokes tobacco.  Social History    Tobacco Use      Smoking status: Former Smoker        Packs/day: 2.50        Years: 9.00        Pack years: 22.5        Quit stephani per pt      Mild single current episode of major depressive disorder ([address]- stable per pt      Diabetes mellitus due to underlying condition with diabetic peripheral angiopathy without gangrene, with long-term current use of insulin ([address]- stable on curren by mouth every 6 (six) hours as needed for Pain. Clopidogrel Bisulfate 75 MG Oral Tab Take 1 tablet (75 mg total) by mouth daily. Specialty Vitamins Products (INULOSE BLOOD SUGAR SUPPORT) Oral Cap Take 1 capsule by mouth 2 (two) times daily.    Glucose Disorder in her brother and father. SOCIAL HISTORY:   She  reports that she quit smoking about 47 years ago. She has a 22.50 pack-year smoking history. She uses smokeless tobacco. She reports that she drinks alcohol.  She reports that she has current or p bilaterally, respirations unlabored   Heart:  Regular rate and rhythm, S1 and S2 normal, no murmur, rub, or gallop   Abdomen:   Soft, non-tender, bowel sounds active all four quadrants,  no masses, no organomegaly   Pelvic: Deferred   Extremities: Extremit current use of insulin ([facility]- stable monitor   -     OFFICE/OUTPT VISIT,EST,LEVL IV  -     LIPID PANEL; Future  -     COMP METABOLIC PANEL (14);  Future  -     HEMOGLOBIN A1C; Future    Diabetic polyneuropathy associated with diabetes mellitus due to underl Future    Hyponatremia- monitor labs     Hypokalemia- monitor labs     Chronic renal disease, stage 3, moderately decreased glomerular filtration rate (GFR) between 30-59 mL/min/1.73 square meter ([facility]- monitor labs   -     COMP METABOLIC PANEL (14);  Futur Annually No results found for: FOB No flowsheet data found.     Glaucoma Screening      Ophthalmology Visit Annually: Diabetics, FHx Glaucoma, AA>50, > 65 Data entered on: 2/19/2019   Last Dilated Eye Exam 2/19/2019       Bone Density Screening Procedure External Lab or Procedure      Annual Monitoring of Persistent     Medications (ACE/ARB, digoxin diuretics, anticonvulsants.)    Potassium  Annually Potassium (mmol/L)   Date Value   06/09/2019 4.6   06/28/2013 4.6     POTASSIUM (mmol/L)   Date V

## 2019-06-13 ENCOUNTER — TELEPHONE (OUTPATIENT)
Dept: FAMILY MEDICINE CLINIC | Facility: CLINIC | Age: 74
End: 2019-06-13

## 2019-06-13 ENCOUNTER — OFFICE VISIT (OUTPATIENT)
Dept: FAMILY MEDICINE CLINIC | Facility: CLINIC | Age: 74
End: 2019-06-13
Payer: MEDICARE

## 2019-06-13 VITALS
BODY MASS INDEX: 23.86 KG/M2 | HEART RATE: 59 BPM | DIASTOLIC BLOOD PRESSURE: 60 MMHG | TEMPERATURE: 98 F | WEIGHT: 128 LBS | SYSTOLIC BLOOD PRESSURE: 140 MMHG | RESPIRATION RATE: 18 BRPM | OXYGEN SATURATION: 98 % | HEIGHT: 61.5 IN

## 2019-06-13 DIAGNOSIS — E11.8 TYPE 2 DIABETES MELLITUS WITH COMPLICATION, WITH LONG-TERM CURRENT USE OF INSULIN (HCC): Primary | ICD-10-CM

## 2019-06-13 DIAGNOSIS — R55 SYNCOPE, UNSPECIFIED SYNCOPE TYPE: ICD-10-CM

## 2019-06-13 DIAGNOSIS — I73.9 PERIPHERAL VASCULAR DISEASE OF LOWER EXTREMITY (HCC): ICD-10-CM

## 2019-06-13 DIAGNOSIS — I25.810 CORONARY ARTERY DISEASE INVOLVING CORONARY BYPASS GRAFT OF NATIVE HEART WITHOUT ANGINA PECTORIS: ICD-10-CM

## 2019-06-13 DIAGNOSIS — Z95.1 S/P CABG X 3: ICD-10-CM

## 2019-06-13 DIAGNOSIS — Z79.4 TYPE 2 DIABETES MELLITUS WITH COMPLICATION, WITH LONG-TERM CURRENT USE OF INSULIN (HCC): Primary | ICD-10-CM

## 2019-06-13 DIAGNOSIS — I77.9 PAOD (PERIPHERAL ARTERIAL OCCLUSIVE DISEASE) (HCC): ICD-10-CM

## 2019-06-13 PROCEDURE — 99496 TRANSJ CARE MGMT HIGH F2F 7D: CPT | Performed by: FAMILY MEDICINE

## 2019-06-13 RX ORDER — SULFAMETHOXAZOLE AND TRIMETHOPRIM 400; 80 MG/1; MG/1
1 TABLET ORAL 2 TIMES DAILY
Qty: 10 TABLET | Refills: 0 | Status: SHIPPED | OUTPATIENT
Start: 2019-06-13 | End: 2019-06-18

## 2019-06-17 NOTE — PROGRESS NOTES
HPI:    Jie Weiner is a 75year old female here today for hospital follow up.    She was discharged from Inpatient hospital, [facility] to Home   Admission Date: 6/8/19   Discharge Date: 6/9/19  Hospital Discharge Diagnoses (since 5/17/2019)   None tablet (2 mg total) by mouth 2 (two) times daily. Turmeric (CURCUMIN 95) 500 MG Oral Cap Take by mouth daily. Omega-3-acid Ethyl Esters 1 g Oral Cap Take 1 g by mouth daily.    acetaminophen 500 MG Oral Tab Take 500 mg by mouth every 6 (six) hours as angioplasty (coronary) (5/12/15); cath drug eluting stent; tonsillectomy; ; cataract; ablation; cath percutaneous  transluminal coronary angioplasty; cabg (2018); bypass surgery; reduction left (Left, ); and reduction right (Right, ) no masses, HSM or tenderness  MUSCULOSKELETAL: back is not tender, FROM of the extremities  EXTREMITIES: no cyanosis, clubbing or edema  NEURO: Oriented times three, cranial nerves are intact, motor and sensory are grossly intact    ASSESSMENT/ PLAN:   Avis

## 2019-06-19 ENCOUNTER — APPOINTMENT (OUTPATIENT)
Dept: GENERAL RADIOLOGY | Facility: HOSPITAL | Age: 74
DRG: 243 | End: 2019-06-19
Attending: EMERGENCY MEDICINE
Payer: MEDICARE

## 2019-06-19 ENCOUNTER — TELEPHONE (OUTPATIENT)
Dept: FAMILY MEDICINE CLINIC | Facility: CLINIC | Age: 74
End: 2019-06-19

## 2019-06-19 ENCOUNTER — HOSPITAL ENCOUNTER (INPATIENT)
Facility: HOSPITAL | Age: 74
LOS: 4 days | Discharge: HOME | DRG: 243 | End: 2019-06-23
Attending: EMERGENCY MEDICINE | Admitting: STUDENT IN AN ORGANIZED HEALTH CARE EDUCATION/TRAINING PROGRAM
Payer: MEDICARE

## 2019-06-19 DIAGNOSIS — R00.1 JUNCTIONAL BRADYCARDIA: ICD-10-CM

## 2019-06-19 DIAGNOSIS — N18.9 ACUTE RENAL FAILURE SUPERIMPOSED ON CHRONIC KIDNEY DISEASE, UNSPECIFIED CKD STAGE, UNSPECIFIED ACUTE RENAL FAILURE TYPE (HCC): ICD-10-CM

## 2019-06-19 DIAGNOSIS — N17.9 ACUTE RENAL FAILURE SUPERIMPOSED ON CHRONIC KIDNEY DISEASE, UNSPECIFIED CKD STAGE, UNSPECIFIED ACUTE RENAL FAILURE TYPE (HCC): ICD-10-CM

## 2019-06-19 DIAGNOSIS — E87.1 HYPONATREMIA: Primary | ICD-10-CM

## 2019-06-19 PROBLEM — D72.829 LEUKOCYTOSIS: Status: ACTIVE | Noted: 2019-06-19

## 2019-06-19 PROBLEM — R73.9 HYPERGLYCEMIA: Status: ACTIVE | Noted: 2019-06-19

## 2019-06-19 PROBLEM — D64.9 ANEMIA: Status: ACTIVE | Noted: 2019-06-19

## 2019-06-19 LAB
ALBUMIN SERPL-MCNC: 2.9 G/DL (ref 3.4–5)
ALBUMIN/GLOB SERPL: 1.1 {RATIO} (ref 1–2)
ALP LIVER SERPL-CCNC: 96 U/L (ref 55–142)
ALT SERPL-CCNC: 26 U/L (ref 13–56)
ANION GAP SERPL CALC-SCNC: 10 MMOL/L (ref 0–18)
ANION GAP SERPL CALC-SCNC: 8 MMOL/L (ref 0–18)
APTT PPP: 25.3 SECONDS (ref 25.4–36.1)
AST SERPL-CCNC: 21 U/L (ref 15–37)
ATRIAL RATE: 39 BPM
BASOPHILS # BLD AUTO: 0.09 X10(3) UL (ref 0–0.2)
BASOPHILS NFR BLD AUTO: 0.5 %
BILIRUB SERPL-MCNC: 0.6 MG/DL (ref 0.1–2)
BILIRUB UR QL STRIP.AUTO: NEGATIVE
BUN BLD-MCNC: 45 MG/DL (ref 7–18)
BUN BLD-MCNC: 46 MG/DL (ref 7–18)
BUN/CREAT SERPL: 19.1 (ref 10–20)
BUN/CREAT SERPL: 21.6 (ref 10–20)
CALCIUM BLD-MCNC: 8.4 MG/DL (ref 8.5–10.1)
CALCIUM BLD-MCNC: 8.5 MG/DL (ref 8.5–10.1)
CHLORIDE SERPL-SCNC: 84 MMOL/L (ref 98–112)
CHLORIDE SERPL-SCNC: 86 MMOL/L (ref 98–112)
CO2 SERPL-SCNC: 24 MMOL/L (ref 21–32)
CO2 SERPL-SCNC: 26 MMOL/L (ref 21–32)
CORTIS SERPL-MCNC: 16.8 UG/DL
CREAT BLD-MCNC: 2.13 MG/DL (ref 0.55–1.02)
CREAT BLD-MCNC: 2.36 MG/DL (ref 0.55–1.02)
CREAT UR-SCNC: 53.6 MG/DL
DEPRECATED RDW RBC AUTO: 46.9 FL (ref 35.1–46.3)
EOSINOPHIL # BLD AUTO: 0.5 X10(3) UL (ref 0–0.7)
EOSINOPHIL NFR BLD AUTO: 2.9 %
ERYTHROCYTE [DISTWIDTH] IN BLOOD BY AUTOMATED COUNT: 14.7 % (ref 11–15)
GLOBULIN PLAS-MCNC: 2.7 G/DL (ref 2.8–4.4)
GLUCOSE BLD-MCNC: 172 MG/DL (ref 70–99)
GLUCOSE BLD-MCNC: 174 MG/DL (ref 70–99)
GLUCOSE BLD-MCNC: 184 MG/DL (ref 70–99)
GLUCOSE BLD-MCNC: 217 MG/DL (ref 70–99)
GLUCOSE UR STRIP.AUTO-MCNC: >=500 MG/DL
HAV IGM SER QL: 2.6 MG/DL (ref 1.6–2.6)
HCT VFR BLD AUTO: 32.2 % (ref 35–48)
HGB BLD-MCNC: 11.5 G/DL (ref 12–16)
IMM GRANULOCYTES # BLD AUTO: 0.1 X10(3) UL (ref 0–1)
IMM GRANULOCYTES NFR BLD: 0.6 %
INR BLD: 0.97 (ref 0.9–1.1)
LYMPHOCYTES # BLD AUTO: 1.55 X10(3) UL (ref 1–4)
LYMPHOCYTES NFR BLD AUTO: 9 %
M PROTEIN MFR SERPL ELPH: 5.6 G/DL (ref 6.4–8.2)
MCH RBC QN AUTO: 31.7 PG (ref 26–34)
MCHC RBC AUTO-ENTMCNC: 35.7 G/DL (ref 31–37)
MCV RBC AUTO: 88.7 FL (ref 80–100)
MONOCYTES # BLD AUTO: 0.45 X10(3) UL (ref 0.1–1)
MONOCYTES NFR BLD AUTO: 2.6 %
NEUTROPHILS # BLD AUTO: 14.56 X10 (3) UL (ref 1.5–7.7)
NEUTROPHILS # BLD AUTO: 14.56 X10(3) UL (ref 1.5–7.7)
NEUTROPHILS NFR BLD AUTO: 84.4 %
NITRITE UR QL STRIP.AUTO: NEGATIVE
OSMOLALITY SERPL CALC.SUM OF ELEC: 264 MOSM/KG (ref 275–295)
OSMOLALITY SERPL CALC.SUM OF ELEC: 266 MOSM/KG (ref 275–295)
OSMOLALITY UR: 295 MOSM/KG (ref 300–1300)
PH UR STRIP.AUTO: 5 [PH] (ref 4.5–8)
PLATELET # BLD AUTO: 339 10(3)UL (ref 150–450)
POTASSIUM SERPL-SCNC: 4.9 MMOL/L (ref 3.5–5.1)
POTASSIUM SERPL-SCNC: 5.1 MMOL/L (ref 3.5–5.1)
PROT UR STRIP.AUTO-MCNC: 100 MG/DL
PSA SERPL DL<=0.01 NG/ML-MCNC: 13.3 SECONDS (ref 12.5–14.7)
Q-T INTERVAL: 480 MS
QRS DURATION: 124 MS
QTC CALCULATION (BEZET): 386 MS
R AXIS: -61 DEGREES
RBC # BLD AUTO: 3.63 X10(6)UL (ref 3.8–5.3)
RBC UR QL AUTO: NEGATIVE
SODIUM SERPL-SCNC: 118 MMOL/L (ref 136–145)
SODIUM SERPL-SCNC: 120 MMOL/L (ref 136–145)
SODIUM SERPL-SCNC: 9 MMOL/L
SP GR UR STRIP.AUTO: 1.01 (ref 1–1.03)
T AXIS: 68 DEGREES
T4 FREE SERPL-MCNC: 0.9 NG/DL (ref 0.8–1.7)
TROPONIN I SERPL-MCNC: <0.045 NG/ML (ref ?–0.04)
TSI SER-ACNC: 1.29 MIU/ML (ref 0.36–3.74)
TSI SER-ACNC: 1.33 MIU/ML (ref 0.36–3.74)
UROBILINOGEN UR STRIP.AUTO-MCNC: <2 MG/DL
VENTRICULAR RATE: 39 BPM
WBC # BLD AUTO: 17.3 X10(3) UL (ref 4–11)

## 2019-06-19 PROCEDURE — 99223 1ST HOSP IP/OBS HIGH 75: CPT | Performed by: STUDENT IN AN ORGANIZED HEALTH CARE EDUCATION/TRAINING PROGRAM

## 2019-06-19 PROCEDURE — 71045 X-RAY EXAM CHEST 1 VIEW: CPT | Performed by: EMERGENCY MEDICINE

## 2019-06-19 PROCEDURE — 99223 1ST HOSP IP/OBS HIGH 75: CPT | Performed by: INTERNAL MEDICINE

## 2019-06-19 PROCEDURE — 99222 1ST HOSP IP/OBS MODERATE 55: CPT | Performed by: INTERNAL MEDICINE

## 2019-06-19 RX ORDER — BUMETANIDE 1 MG/1
1 TABLET ORAL
Status: DISCONTINUED | OUTPATIENT
Start: 2019-06-19 | End: 2019-06-23

## 2019-06-19 RX ORDER — SODIUM CHLORIDE 9 MG/ML
500 INJECTION, SOLUTION INTRAVENOUS ONCE
Status: COMPLETED | OUTPATIENT
Start: 2019-06-19 | End: 2019-06-19

## 2019-06-19 RX ORDER — BISACODYL 10 MG
10 SUPPOSITORY, RECTAL RECTAL
Status: DISCONTINUED | OUTPATIENT
Start: 2019-06-19 | End: 2019-06-23

## 2019-06-19 RX ORDER — METOCLOPRAMIDE HYDROCHLORIDE 5 MG/ML
5 INJECTION INTRAMUSCULAR; INTRAVENOUS EVERY 8 HOURS PRN
Status: DISCONTINUED | OUTPATIENT
Start: 2019-06-19 | End: 2019-06-23

## 2019-06-19 RX ORDER — CLOPIDOGREL BISULFATE 75 MG/1
75 TABLET ORAL DAILY
Status: DISCONTINUED | OUTPATIENT
Start: 2019-06-20 | End: 2019-06-23

## 2019-06-19 RX ORDER — ACETAMINOPHEN 325 MG/1
650 TABLET ORAL EVERY 6 HOURS PRN
Status: DISCONTINUED | OUTPATIENT
Start: 2019-06-19 | End: 2019-06-23

## 2019-06-19 RX ORDER — DOBUTAMINE HYDROCHLORIDE 200 MG/100ML
INJECTION INTRAVENOUS CONTINUOUS
Status: DISCONTINUED | OUTPATIENT
Start: 2019-06-19 | End: 2019-06-20

## 2019-06-19 RX ORDER — DOBUTAMINE HYDROCHLORIDE 200 MG/100ML
INJECTION INTRAVENOUS
Status: DISPENSED
Start: 2019-06-19 | End: 2019-06-20

## 2019-06-19 RX ORDER — SODIUM CHLORIDE 9 MG/ML
INJECTION, SOLUTION INTRAVENOUS CONTINUOUS
Status: DISCONTINUED | OUTPATIENT
Start: 2019-06-19 | End: 2019-06-22

## 2019-06-19 RX ORDER — HEPARIN SODIUM 5000 [USP'U]/ML
5000 INJECTION, SOLUTION INTRAVENOUS; SUBCUTANEOUS EVERY 12 HOURS SCHEDULED
Status: DISCONTINUED | OUTPATIENT
Start: 2019-06-19 | End: 2019-06-21

## 2019-06-19 RX ORDER — POLYETHYLENE GLYCOL 3350 17 G/17G
17 POWDER, FOR SOLUTION ORAL DAILY PRN
Status: DISCONTINUED | OUTPATIENT
Start: 2019-06-19 | End: 2019-06-23

## 2019-06-19 RX ORDER — ONDANSETRON 2 MG/ML
4 INJECTION INTRAMUSCULAR; INTRAVENOUS EVERY 6 HOURS PRN
Status: DISCONTINUED | OUTPATIENT
Start: 2019-06-19 | End: 2019-06-23

## 2019-06-19 NOTE — ED PROVIDER NOTES
Patient Seen in: [facility] Emergency Department    History   Patient presents with:  Dizziness (neurologic)    Stated Complaint: \"slow heart rate\", dizzy    HPI  Ethel Ball is a 41-year-old female presents today for evaluation of slow heart rate.   She • Sleep apnea     CPAP   • Unspecified essential hypertension    • Visual impairment     scar on left retina; glasses       Past Surgical History:   Procedure Laterality Date   • ABLATION      venous   • ANGIOGRAM     • ANGIOPLASTY (CORONARY)  5/2014 All other systems reviewed and negative except as noted above.     Physical Exam     ED Triage Vitals   BP 06/19/19 1245 (!) 164/85   Pulse 06/19/19 1230 (!) 31   Resp 06/19/19 1230 14   Temp 06/19/19 1230 97.8 °F (36.6 °C)   Temp src 06/19/19 1230 Temp DIFFERENTIAL - Abnormal; Notable for the following components:    WBC 17.3 (*)     RBC 3.63 (*)     HGB 11.5 (*)     HCT 32.2 (*)     RDW-SD 46.9 (*)     Neutrophil Absolute Prelim 14.56 (*)     Neutrophil Absolute 14.56 (*)     All other components within Disposition and Plan     Clinical Impression:  Hyponatremia  (primary encounter diagnosis)  Acute renal failure superimposed on chronic kidney disease, unspecified CKD stage, unspecified acute renal failure type (HCC)  Junctional bradycardia

## 2019-06-19 NOTE — CONSULTS
[facility]  Cardiology Consultation    Veres P[address] Amezquita. Patient Status:  Inpatient    1945 MRN [id number]   [facility] 6NE-A Attending Giovanni Perez MD   [facility] Day # 0 PCP Sabi Rodrigues DO     Reason for Consultation:  bradycardia • ANGIOPLASTY (CORONARY)  2014    RCA, ramus   • ANGIOPLASTY (CORONARY)      LAD,RCA   • ANGIOPLASTY (CORONARY)  2014    Rt common fem arthroplasry, stent   • ANGIOPLASTY (CORONARY)  5/12/15    right popliteal   • BYPASS SURGERY     •  says she developed sores             Pt says she developed sores  Acetaminophen-Codei*    PAIN, OTHER (SEE COMMENTS)    Comment:Other reaction(s): Headache, HEADACHES  Opioid Analgesics       PAIN    Comment:Other reaction(s):  Other (See Comments)  [place] (DOBUTREX) 500 mg/250 ml infusion, , ,     Review of Systems:  A comprehensive review of systems was performed and was negative if not otherwise mentioned in above HPI.     /43   Pulse (!) 33   Temp 97.7 °F (36.5 °C) (Temporal)   Resp 15   Ht 5' 1\" ( Impression:  Principal Problem:    Hyponatremia  Active Problems:    Coronary artery disease involving coronary bypass graft of native heart without angina pectoris    S/P CABG x 3    Anemia    Leukocytosis    Hyperglycemia    Acute renal failure super

## 2019-06-19 NOTE — TELEPHONE ENCOUNTER
Was in the hospital recently. Woke up with a slow heart rate of 46. Dizziness,headache. She is just getting over a bladder infection.   She is on a water pill so she is afraid that her electrolytes are off so she did not take it today

## 2019-06-19 NOTE — H&P
[facility]  History and Physical     Rose Barger Patient Status:  Emergency    1945 MRN [id number]   Location [facility] Attending Srini Griffin MD   Hosp Day # 0 PCP Fabi Ingram DO     Chief Complaint: d essential hypertension    • Visual impairment     scar on left retina; glasses        Past Surgical History:   Past Surgical History:   Procedure Laterality Date   • ABLATION      venous   • ANGIOGRAM     • ANGIOPLASTY (CORONARY)  5/2014    RCA, ramus   • black             Feet turn black, muscle weakness in the legs  Warfarin                OTHER (SEE COMMENTS)    Comment:Pt says she developed sores             Oral, coumadin necrosis             Pt says she developed sores             Pt says she develope mouth daily. Disp:  Rfl:    acetaminophen 500 MG Oral Tab Take 500 mg by mouth every 6 (six) hours as needed for Pain. Disp:  Rfl:        Review of Systems:   A comprehensive 14 point review of systems was completed.     Pertinent positives and negatives no consuming < 0.5 g Na/day since d/c   4. CHF with acute exacerbation  1. Due to medication non adherence/ intolerance( nocturia)  5. H/o CAD  6.  DM type 2     Quality:  · DVT Prophylaxis: Heparin   · CODE status: full  · Schwab: no    Plan of care discussed

## 2019-06-19 NOTE — CONSULTS
[facility]  Report of Consultation    Diony Maryuriarslan Patient Status:  Emergency    1945 MRN [id number]   Location [address] Attending Judy Hernandez MD   [facility] Day # 0 PCP Smith Baptiste, DO     Reason for Consultat transfusion     x2 transfusions last one about 2015 or so.    • HTN (hypertension) 7/11/2013   • Intermittent claudication [facility])    • Neuropathy    • Osteoarthritis    • Peripheral vascular disease (HCC)     stents and bypass right leg, 2/2015   • Proteinur she drinks alcohol. She reports that she has current or past drug history.     Allergies:    Statins                 OTHER (SEE COMMENTS)    Comment:Feet turn black, muscle weakness in the legs             CLASS, feet turned black             Feet turn raman Tab Take 1 tablet (75 mg total) by mouth daily. Specialty Vitamins Products (INULOSE BLOOD SUGAR SUPPORT) Oral Cap Take 1 capsule by mouth 2 (two) times daily.    Glucose Blood (BERENICE CONTOUR NEXT TEST) In Vitro Strip Testing four times daily   BERENICE MICR 05/09/2014 17     Blood Urea Nitrogen (mg/dL)   Date Value   06/28/2013 20   01/26/2010 13   08/22/2008 19     Creatinine, Serum (mg/dL)   Date Value   06/28/2013 0.60   01/26/2010 0.75   08/22/2008 0.70     CREATININE (mg/dL)   Date Value   05/13/2014 0

## 2019-06-19 NOTE — PROGRESS NOTES
Pharmacy Note:  Renal Dose Adjustment for Metoclopramide (REGLAN)         Edin Kidd has been prescribed metoclopramide 10 mg q8hr prn.     Est CrCl: ~16 mL/min    Her calculated creatinine clearance is < 40 mL/min, therefore the dose of metoclopramide

## 2019-06-19 NOTE — TELEPHONE ENCOUNTER
Spoke to pt c/o dizzyness since waking up. Pt also states her pulse is low around 40. Pt advised to go to ER. Pt verbalized understanding and states her  will drive her.

## 2019-06-19 NOTE — HISTORICAL OFFICE NOTE
Matt Whitman MD - 2019 11:30 AM CDT  Formatting of this note might be different from the original.  3/22/2019    [address] [place] [address] [facility]  Clinic Note    Sherine Sin  : 1945  PCP: Daron Perry surgical history   hombectomy 2015   • Other surgical history   Cath with stent to RCA-3/07   • Peripheral angiogram   peripheral angiogram, plasty ant tib, popliteal R 7/2015, proximal LAD 10/25/2016   • Pr drug-eluting stents, single   • Ptca with stent Probiotic Product (PROBIOTIC PO) 2 daily   • Acetylcysteine (N-ACETYL-L-CYSTEINE) 600 MG Cap 3 daily   • Ascorbic Acid (VITAMIN C) 1000 MG tablet as directed   • clopidogrel (PLAVIX) 75 MG tablet Take 1 tablet by mouth daily.  90 tablet 1   • furosemide (LA summer. Otherwise, may continue with her present regimen. I will see her back in about 6 months.     Rebecca Tian MD    03/22/19

## 2019-06-19 NOTE — ED NOTES
Atropine was given to [facility], CCU RN. Atropine was not used in the ED. Pt remained asymptomatic and stable.

## 2019-06-20 ENCOUNTER — APPOINTMENT (OUTPATIENT)
Dept: CV DIAGNOSTICS | Facility: HOSPITAL | Age: 74
DRG: 243 | End: 2019-06-20
Attending: INTERNAL MEDICINE
Payer: MEDICARE

## 2019-06-20 LAB
ANION GAP SERPL CALC-SCNC: 11 MMOL/L (ref 0–18)
ANION GAP SERPL CALC-SCNC: 8 MMOL/L (ref 0–18)
ANION GAP SERPL CALC-SCNC: 8 MMOL/L (ref 0–18)
ATRIAL RATE: 15 BPM
BUN BLD-MCNC: 44 MG/DL (ref 7–18)
BUN BLD-MCNC: 45 MG/DL (ref 7–18)
BUN BLD-MCNC: 46 MG/DL (ref 7–18)
BUN/CREAT SERPL: 21.2 (ref 10–20)
BUN/CREAT SERPL: 21.4 (ref 10–20)
BUN/CREAT SERPL: 23 (ref 10–20)
CALCIUM BLD-MCNC: 8.2 MG/DL (ref 8.5–10.1)
CALCIUM BLD-MCNC: 8.3 MG/DL (ref 8.5–10.1)
CALCIUM BLD-MCNC: 8.3 MG/DL (ref 8.5–10.1)
CHLORIDE SERPL-SCNC: 89 MMOL/L (ref 98–112)
CHLORIDE SERPL-SCNC: 90 MMOL/L (ref 98–112)
CHLORIDE SERPL-SCNC: 92 MMOL/L (ref 98–112)
CO2 SERPL-SCNC: 21 MMOL/L (ref 21–32)
CO2 SERPL-SCNC: 23 MMOL/L (ref 21–32)
CO2 SERPL-SCNC: 24 MMOL/L (ref 21–32)
CREAT BLD-MCNC: 1.91 MG/DL (ref 0.55–1.02)
CREAT BLD-MCNC: 2.12 MG/DL (ref 0.55–1.02)
CREAT BLD-MCNC: 2.15 MG/DL (ref 0.55–1.02)
DEPRECATED RDW RBC AUTO: 47.8 FL (ref 35.1–46.3)
ERYTHROCYTE [DISTWIDTH] IN BLOOD BY AUTOMATED COUNT: 14.9 % (ref 11–15)
EST. AVERAGE GLUCOSE BLD GHB EST-MCNC: 180 MG/DL (ref 68–126)
GLUCOSE BLD-MCNC: 144 MG/DL (ref 70–99)
GLUCOSE BLD-MCNC: 155 MG/DL (ref 70–99)
GLUCOSE BLD-MCNC: 162 MG/DL (ref 70–99)
GLUCOSE BLD-MCNC: 163 MG/DL (ref 70–99)
GLUCOSE BLD-MCNC: 170 MG/DL (ref 70–99)
GLUCOSE BLD-MCNC: 175 MG/DL (ref 70–99)
GLUCOSE BLD-MCNC: 201 MG/DL (ref 70–99)
HBA1C MFR BLD HPLC: 7.9 % (ref ?–5.7)
HCT VFR BLD AUTO: 30.4 % (ref 35–48)
HGB BLD-MCNC: 10.4 G/DL (ref 12–16)
MCH RBC QN AUTO: 30.6 PG (ref 26–34)
MCHC RBC AUTO-ENTMCNC: 34.2 G/DL (ref 31–37)
MCV RBC AUTO: 89.4 FL (ref 80–100)
OSMOLALITY SERPL CALC.SUM OF ELEC: 268 MOSM/KG (ref 275–295)
OSMOLALITY SERPL CALC.SUM OF ELEC: 269 MOSM/KG (ref 275–295)
OSMOLALITY SERPL CALC.SUM OF ELEC: 270 MOSM/KG (ref 275–295)
PLATELET # BLD AUTO: 295 10(3)UL (ref 150–450)
POTASSIUM SERPL-SCNC: 4.5 MMOL/L (ref 3.5–5.1)
POTASSIUM SERPL-SCNC: 4.9 MMOL/L (ref 3.5–5.1)
POTASSIUM SERPL-SCNC: 4.9 MMOL/L (ref 3.5–5.1)
Q-T INTERVAL: 444 MS
QRS DURATION: 114 MS
QTC CALCULATION (BEZET): 409 MS
R AXIS: -69 DEGREES
RBC # BLD AUTO: 3.4 X10(6)UL (ref 3.8–5.3)
SODIUM SERPL-SCNC: 121 MMOL/L (ref 136–145)
SODIUM SERPL-SCNC: 122 MMOL/L (ref 136–145)
SODIUM SERPL-SCNC: 123 MMOL/L (ref 136–145)
T AXIS: 80 DEGREES
VENTRICULAR RATE: 51 BPM
WBC # BLD AUTO: 23.8 X10(3) UL (ref 4–11)

## 2019-06-20 PROCEDURE — 99222 1ST HOSP IP/OBS MODERATE 55: CPT | Performed by: INTERNAL MEDICINE

## 2019-06-20 PROCEDURE — 99233 SBSQ HOSP IP/OBS HIGH 50: CPT | Performed by: INTERNAL MEDICINE

## 2019-06-20 PROCEDURE — 93306 TTE W/DOPPLER COMPLETE: CPT | Performed by: INTERNAL MEDICINE

## 2019-06-20 PROCEDURE — 99232 SBSQ HOSP IP/OBS MODERATE 35: CPT | Performed by: HOSPITALIST

## 2019-06-20 RX ORDER — DEXTROSE MONOHYDRATE 25 G/50ML
50 INJECTION, SOLUTION INTRAVENOUS
Status: DISCONTINUED | OUTPATIENT
Start: 2019-06-20 | End: 2019-06-23

## 2019-06-20 NOTE — PROGRESS NOTES
[facility]  Progress Note     Gallomaite Karen Barger Patient Status:  Inpatient    1945 MRN [id number]   [facility] 6NE-A Attending Ursula Navos [facility] Day # 1 PCP Malon Crigler, DO     Chief Complaint:     S: Patient seen an INR 0.97       Recent Labs   Lab 06/19/19  1243   TROP <0.045       Lab Results   Component Value Date    TSH 1.290 06/19/2019    TSH 1.330 06/19/2019    T4F 0.9 06/19/2019       Imaging: Imaging data reviewed in Epic.     Medications:   • bumetanide  1 m

## 2019-06-20 NOTE — PROGRESS NOTES
Pt awake and alert. Asymptomatic with when ambulating. Junctional rhythm in the 50's. dobutamine @ 2.5 mcq. Bumex PO given. Pt sometimes unable to void. Bladder scan done and straight cath. Plan of care reviewed with pt.

## 2019-06-20 NOTE — PROGRESS NOTES
[facility]  Progress Note    [address] Patient Status:  Emergency    1945 MRN [id number]   Location [address] Attending Esmer Golden MD   [facility] Day # 1 PCP Sabi Rodrigues, DO     No acute issues overnight  Un Clear to auscultation bilaterally. Cardiovascular: S1, S2.  bracycardic ; HR in 50s  Abdomen: Soft, nontender, nondistended. Positive bowel sounds. No rebound tenderness   Neurologic: No focal neurological deficits.    Musculoskeletal: Full range of mery

## 2019-06-20 NOTE — PROGRESS NOTES
Received patient at 1930  A/O x 4  SB on tele  HR in 40's   On Dobutamine at 2.5mcg/min  SBP stable  UA/urine studies sent  Accuchecks/SS insulin  BMP Q8 ( at 1623)  Patient up to bathroom with assist  Patient asymptomatic when ambulating  Will pao

## 2019-06-20 NOTE — PLAN OF CARE
1600 Received pt from ER, awake, responsive, follows commands. SB hr in the 30's. DR Ch at the bedside. Dobutamine started at 2.5 mcq.1900 Reported off to oncoming RN.

## 2019-06-21 ENCOUNTER — APPOINTMENT (OUTPATIENT)
Dept: GENERAL RADIOLOGY | Facility: HOSPITAL | Age: 74
DRG: 243 | End: 2019-06-21
Attending: INTERNAL MEDICINE
Payer: MEDICARE

## 2019-06-21 ENCOUNTER — APPOINTMENT (OUTPATIENT)
Dept: INTERVENTIONAL RADIOLOGY/VASCULAR | Facility: HOSPITAL | Age: 74
DRG: 243 | End: 2019-06-21
Attending: INTERNAL MEDICINE
Payer: MEDICARE

## 2019-06-21 LAB
ANION GAP SERPL CALC-SCNC: 10 MMOL/L (ref 0–18)
ANION GAP SERPL CALC-SCNC: 7 MMOL/L (ref 0–18)
BUN BLD-MCNC: 38 MG/DL (ref 7–18)
BUN BLD-MCNC: 42 MG/DL (ref 7–18)
BUN/CREAT SERPL: 25.6 (ref 10–20)
BUN/CREAT SERPL: 26.2 (ref 10–20)
CALCIUM BLD-MCNC: 8.4 MG/DL (ref 8.5–10.1)
CALCIUM BLD-MCNC: 8.5 MG/DL (ref 8.5–10.1)
CHLORIDE SERPL-SCNC: 101 MMOL/L (ref 98–112)
CHLORIDE SERPL-SCNC: 96 MMOL/L (ref 98–112)
CO2 SERPL-SCNC: 17 MMOL/L (ref 21–32)
CO2 SERPL-SCNC: 23 MMOL/L (ref 21–32)
CREAT BLD-MCNC: 1.45 MG/DL (ref 0.55–1.02)
CREAT BLD-MCNC: 1.64 MG/DL (ref 0.55–1.02)
GLUCOSE BLD-MCNC: 122 MG/DL (ref 70–99)
GLUCOSE BLD-MCNC: 130 MG/DL (ref 70–99)
GLUCOSE BLD-MCNC: 145 MG/DL (ref 70–99)
GLUCOSE BLD-MCNC: 165 MG/DL (ref 70–99)
GLUCOSE BLD-MCNC: 97 MG/DL (ref 70–99)
GLUCOSE BLD-MCNC: 98 MG/DL (ref 70–99)
OSMOLALITY SERPL CALC.SUM OF ELEC: 269 MOSM/KG (ref 275–295)
OSMOLALITY SERPL CALC.SUM OF ELEC: 282 MOSM/KG (ref 275–295)
POTASSIUM SERPL-SCNC: 4.4 MMOL/L (ref 3.5–5.1)
POTASSIUM SERPL-SCNC: 4.6 MMOL/L (ref 3.5–5.1)
SODIUM SERPL-SCNC: 125 MMOL/L (ref 136–145)
SODIUM SERPL-SCNC: 129 MMOL/L (ref 136–145)

## 2019-06-21 PROCEDURE — 02HK3JZ INSERTION OF PACEMAKER LEAD INTO RIGHT VENTRICLE, PERCUTANEOUS APPROACH: ICD-10-PCS | Performed by: INTERNAL MEDICINE

## 2019-06-21 PROCEDURE — 99232 SBSQ HOSP IP/OBS MODERATE 35: CPT | Performed by: HOSPITALIST

## 2019-06-21 PROCEDURE — 33208 INSRT HEART PM ATRIAL & VENT: CPT | Performed by: INTERNAL MEDICINE

## 2019-06-21 PROCEDURE — 0JH606Z INSERTION OF PACEMAKER, DUAL CHAMBER INTO CHEST SUBCUTANEOUS TISSUE AND FASCIA, OPEN APPROACH: ICD-10-PCS | Performed by: INTERNAL MEDICINE

## 2019-06-21 PROCEDURE — B517YZZ FLUOROSCOPY OF LEFT SUBCLAVIAN VEIN USING OTHER CONTRAST: ICD-10-PCS | Performed by: INTERNAL MEDICINE

## 2019-06-21 PROCEDURE — 02H63JZ INSERTION OF PACEMAKER LEAD INTO RIGHT ATRIUM, PERCUTANEOUS APPROACH: ICD-10-PCS | Performed by: INTERNAL MEDICINE

## 2019-06-21 PROCEDURE — 99233 SBSQ HOSP IP/OBS HIGH 50: CPT | Performed by: INTERNAL MEDICINE

## 2019-06-21 PROCEDURE — 71045 X-RAY EXAM CHEST 1 VIEW: CPT | Performed by: INTERNAL MEDICINE

## 2019-06-21 PROCEDURE — 99152 MOD SED SAME PHYS/QHP 5/>YRS: CPT | Performed by: INTERNAL MEDICINE

## 2019-06-21 RX ORDER — CEFAZOLIN SODIUM/WATER 2 G/20 ML
2 SYRINGE (ML) INTRAVENOUS
Status: CANCELLED | OUTPATIENT
Start: 2019-06-21 | End: 2019-06-21

## 2019-06-21 RX ORDER — SODIUM CHLORIDE 9 MG/ML
INJECTION, SOLUTION INTRAVENOUS
Status: CANCELLED | OUTPATIENT
Start: 2019-06-22 | End: 2019-06-22

## 2019-06-21 RX ORDER — TOLVAPTAN 15 MG/1
15 TABLET ORAL ONCE
Status: COMPLETED | OUTPATIENT
Start: 2019-06-21 | End: 2019-06-21

## 2019-06-21 RX ORDER — LIDOCAINE HYDROCHLORIDE 10 MG/ML
INJECTION, SOLUTION EPIDURAL; INFILTRATION; INTRACAUDAL; PERINEURAL
Status: COMPLETED
Start: 2019-06-21 | End: 2019-06-21

## 2019-06-21 RX ORDER — BACITRACIN 50000 [USP'U]/1
INJECTION, POWDER, LYOPHILIZED, FOR SOLUTION INTRAMUSCULAR
Status: COMPLETED
Start: 2019-06-21 | End: 2019-06-21

## 2019-06-21 RX ORDER — CEFAZOLIN SODIUM/WATER 2 G/20 ML
2 SYRINGE (ML) INTRAVENOUS
Status: COMPLETED | OUTPATIENT
Start: 2019-06-21 | End: 2019-06-21

## 2019-06-21 RX ORDER — CHLORHEXIDINE GLUCONATE 4 G/100ML
30 SOLUTION TOPICAL
Status: DISCONTINUED | OUTPATIENT
Start: 2019-06-22 | End: 2019-06-21 | Stop reason: HOSPADM

## 2019-06-21 RX ORDER — SODIUM CHLORIDE 9 MG/ML
INJECTION, SOLUTION INTRAVENOUS
Status: DISCONTINUED | OUTPATIENT
Start: 2019-06-22 | End: 2019-06-21 | Stop reason: HOSPADM

## 2019-06-21 RX ORDER — CEFAZOLIN SODIUM/WATER 2 G/20 ML
2 SYRINGE (ML) INTRAVENOUS EVERY 8 HOURS
Status: COMPLETED | OUTPATIENT
Start: 2019-06-21 | End: 2019-06-22

## 2019-06-21 RX ORDER — MIDAZOLAM HYDROCHLORIDE 1 MG/ML
INJECTION INTRAMUSCULAR; INTRAVENOUS
Status: COMPLETED
Start: 2019-06-21 | End: 2019-06-21

## 2019-06-21 RX ORDER — CHLORHEXIDINE GLUCONATE 4 G/100ML
30 SOLUTION TOPICAL
Status: CANCELLED | OUTPATIENT
Start: 2019-06-22 | End: 2019-06-22

## 2019-06-21 RX ORDER — CEFAZOLIN SODIUM/WATER 2 G/20 ML
SYRINGE (ML) INTRAVENOUS
Status: COMPLETED
Start: 2019-06-21 | End: 2019-06-21

## 2019-06-21 NOTE — PROGRESS NOTES
[facility]  Progress Note    Andra [address]. Patient Status:  Emergency    1945 MRN [id number]   Location [address] Attending Dayana Carlisle MD   [facility] Day # 2 PCP Paulino Marquez, DO     No acute issues overnight  So Neurologic: No focal neurological deficits. Musculoskeletal: Full range of motion of all extremities. No swelling noted. Integument: No lesions. No erythema. Psychiatric: Appropriate mood and affect.     Recent Labs     06/19/19  1243 06/20/19  9330

## 2019-06-21 NOTE — CDS QUERY
CLINICAL DOCUMENTATION CLARIFICATION  Heart Failure  Clinical information (provided below) includes a diagnosis of Heart Failure.  For accurate ICD-10-CM code assignment to reflect severity of illness and risk of mortality,  PLEASE CHECK ALL DIAGNOSES THAT was mildly    increased. Systolic function was normal. The estimated ejection fraction    was 60-65%. No diagnostic evidence for regional wall motion   abnormalities. The study is not technically sufficient to allow    evaluation of LV diastolic function.

## 2019-06-21 NOTE — PROGRESS NOTES
Received patient at [facility] 894 x 4  JR/ juan carlos ARIZMENDI on tele  Awaiting pacemaker placement  Patient in agreement with procedure  CPAP from home at night/ supplemental O2 if patient refusing CPAP  Accuchecks/ patient refusing SS insulin  BMP Q8  Up to bath

## 2019-06-21 NOTE — PROGRESS NOTES
[facility]  Progress Note    Debby Barger Patient Status:  Inpatient    1945 MRN [id number]   [facility] 6NE-A Attending [facility] Day # 2 PCP Carol Delgado DO     Assessment:  1.   Symptomatic junctional bra 06/21/2019    CREATSERUM 1.64 06/21/2019     06/21/2019    CA 8.4 06/21/2019        Lab Results   Component Value Date    TROP <0.045 06/19/2019    TROP <0.045 06/09/2019    TROP <0.045 06/08/2019    CKMB 8.1 (H) 02/11/2015        Medications:    •

## 2019-06-21 NOTE — PROGRESS NOTES
[facility]  Progress Note     Aman Janette Barger Patient Status:  Inpatient    1945 MRN [id number]   [facility] 6NE-A Attending R[address]  [place] Day # 2 PCP Giselle Levi DO     S: Patient seen and examined at bedside --     < > = values in this interval not displayed. Estimated Creatinine Clearance: 22.4 mL/min (A) (based on SCr of 1.64 mg/dL (H)).     Recent Labs   Lab 06/19/19  1243   PTP 13.3   INR 0.97       Recent Labs   Lab 06/19/19  1243   TROP <0.045 current state of illness, I anticipate that, after discharge, patient will require TBD.

## 2019-06-21 NOTE — OPERATIVE REPORT
ELECTROPHYSIOLOGY SERVICE OP REPORT    DATE OF PROCEDURE: 06/21/19    PROCEDURE:   Dual chamber PPM implant  Administration of IV conscious sedation    OPERATION PERFORMED:    1.  Implantation of Artomatix dual chamber PPM, serial number [id number] at t for left upper extremity venogram. The wire was advanced to the IVC under fluoroscopic guidance. A total of 2 wires were placed.       RIGHT VENTRICULAR LEAD:  A 6 Amharic peel away sheath was brought to the field and placed  into the venous system via over such that the coiled  redundant leads were posterior to the pulse generator. A header stitch was placed using 0-ethibond suture. The pocket was closed with 3 layers of continuous suture using 2-0 vicryl, 3-0 vicryl and 4-0 monocryl.   Steri-Strips were appl

## 2019-06-21 NOTE — PROGRESS NOTES
Pt awake and alert. Went for PPM today. Dressing to left upper chest. C,d,i. Pulses palpable. Bladder scan done greater than 999. straight cath done 1300ml return. Pcxr done per order. Plan of care reviewed with pt and spouse.

## 2019-06-22 ENCOUNTER — APPOINTMENT (OUTPATIENT)
Dept: GENERAL RADIOLOGY | Facility: HOSPITAL | Age: 74
DRG: 243 | End: 2019-06-22
Attending: INTERNAL MEDICINE
Payer: MEDICARE

## 2019-06-22 LAB
ANION GAP SERPL CALC-SCNC: 13 MMOL/L (ref 0–18)
ANION GAP SERPL CALC-SCNC: 9 MMOL/L (ref 0–18)
BUN BLD-MCNC: 35 MG/DL (ref 7–18)
BUN BLD-MCNC: 37 MG/DL (ref 7–18)
BUN/CREAT SERPL: 27.1 (ref 10–20)
BUN/CREAT SERPL: 30.8 (ref 10–20)
CALCIUM BLD-MCNC: 8.5 MG/DL (ref 8.5–10.1)
CALCIUM BLD-MCNC: 8.8 MG/DL (ref 8.5–10.1)
CHLORIDE SERPL-SCNC: 102 MMOL/L (ref 98–112)
CHLORIDE SERPL-SCNC: 103 MMOL/L (ref 98–112)
CO2 SERPL-SCNC: 18 MMOL/L (ref 21–32)
CO2 SERPL-SCNC: 23 MMOL/L (ref 21–32)
CREAT BLD-MCNC: 1.2 MG/DL (ref 0.55–1.02)
CREAT BLD-MCNC: 1.29 MG/DL (ref 0.55–1.02)
GLUCOSE BLD-MCNC: 100 MG/DL (ref 70–99)
GLUCOSE BLD-MCNC: 106 MG/DL (ref 70–99)
GLUCOSE BLD-MCNC: 114 MG/DL (ref 70–99)
GLUCOSE BLD-MCNC: 174 MG/DL (ref 70–99)
GLUCOSE BLD-MCNC: 203 MG/DL (ref 70–99)
GLUCOSE BLD-MCNC: 209 MG/DL (ref 70–99)
OSMOLALITY SERPL CALC.SUM OF ELEC: 289 MOSM/KG (ref 275–295)
OSMOLALITY SERPL CALC.SUM OF ELEC: 290 MOSM/KG (ref 275–295)
POTASSIUM SERPL-SCNC: 3.8 MMOL/L (ref 3.5–5.1)
POTASSIUM SERPL-SCNC: 4.5 MMOL/L (ref 3.5–5.1)
SODIUM SERPL-SCNC: 133 MMOL/L (ref 136–145)
SODIUM SERPL-SCNC: 135 MMOL/L (ref 136–145)

## 2019-06-22 PROCEDURE — 99024 POSTOP FOLLOW-UP VISIT: CPT | Performed by: INTERNAL MEDICINE

## 2019-06-22 PROCEDURE — 99233 SBSQ HOSP IP/OBS HIGH 50: CPT | Performed by: INTERNAL MEDICINE

## 2019-06-22 PROCEDURE — 4B02XSZ MEASUREMENT OF CARDIAC PACEMAKER, EXTERNAL APPROACH: ICD-10-PCS | Performed by: INTERNAL MEDICINE

## 2019-06-22 PROCEDURE — 71046 X-RAY EXAM CHEST 2 VIEWS: CPT | Performed by: INTERNAL MEDICINE

## 2019-06-22 RX ORDER — POTASSIUM CHLORIDE 20 MEQ/1
40 TABLET, EXTENDED RELEASE ORAL ONCE
Status: COMPLETED | OUTPATIENT
Start: 2019-06-22 | End: 2019-06-22

## 2019-06-22 RX ORDER — ALFUZOSIN HYDROCHLORIDE 10 MG/1
10 TABLET, EXTENDED RELEASE ORAL
Status: DISCONTINUED | OUTPATIENT
Start: 2019-06-22 | End: 2019-06-22

## 2019-06-22 RX ORDER — BUMETANIDE 1 MG/1
1 TABLET ORAL
Qty: 60 TABLET | Refills: 0 | Status: SHIPPED | OUTPATIENT
Start: 2019-06-22 | End: 2019-07-01

## 2019-06-22 RX ORDER — BUMETANIDE 1 MG/1
1 TABLET ORAL 2 TIMES DAILY
Qty: 60 TABLET | Refills: 5 | Status: SHIPPED | OUTPATIENT
Start: 2019-06-22 | End: 2019-06-22

## 2019-06-22 RX ORDER — ALFUZOSIN HYDROCHLORIDE 10 MG/1
10 TABLET, EXTENDED RELEASE ORAL
Qty: 30 TABLET | Refills: 1 | Status: SHIPPED | OUTPATIENT
Start: 2019-06-23 | End: 2019-06-23

## 2019-06-22 NOTE — PROGRESS NOTES
[facility]  Progress Note    Debby Barger Patient Status:  Inpatient    1945 MRN [id number]   [facility] 6NE-A Attending Celine [address] [place] [address] Day # 3 PCP Carol Delgado DO     Assessment:  1.   SSS s/p [employer] BUN 38 06/21/2019    CREATSERUM 1.45 06/21/2019    GLU 98 06/21/2019    CA 8.5 06/21/2019        Lab Results   Component Value Date    TROP <0.045 06/19/2019    TROP <0.045 06/09/2019    TROP <0.045 06/08/2019    CKMB 8.1 (H) 02/11/2015        Medications:

## 2019-06-22 NOTE — CONSULTS
[facility] Urology   Consultation Note    Edin Kidd Patient Status:  Inpatient    1945 MRN [id number]   [facility] 6NE-A Attending Teetee BarlowLYN [facility] Day # 3 PCP Amirah Kinsey DO     Reason for Consultation: bypass right leg, 2/2015   • Proteinuria    • Sleep apnea     CPAP   • Unspecified essential hypertension    • Visual impairment     scar on left retina; glasses     Past Surgical History:   Procedure Laterality Date   • ABLATION      venous   • ANGIOGRAM turned black             Feet turn black, muscle weakness in the legs  Warfarin                OTHER (SEE COMMENTS)    Comment:Pt says she developed sores             Oral, coumadin necrosis             Pt says she developed sores             Pt says she d (Diuretic)  •  Clopidogrel Bisulfate (PLAVIX) tab 75 mg, 75 mg, Oral, Daily  •  acetaminophen (TYLENOL) tab 650 mg, 650 mg, Oral, Q6H PRN  •  ondansetron HCl (ZOFRAN) injection 4 mg, 4 mg, Intravenous, Q6H PRN  •  Metoclopramide HCl (REGLAN) injection 5 mg Patent. Bilateral pleural effusions noted.     =====  CONCLUSION:  Visualized pancreas appears echogenic which is nonspecific although a recommend clinical correlation to exclude pancreatitis. Portions of the pancreas are obscured by bowel gas.      Bi Bradycardia     MON[facility]) ([facility]     Cardiorenal syndrome     Acute renal failure superimposed on chronic kidney disease, unspecified CKD stage, unspecified acute renal failure type ([facility]     Junctional bradycardia     Urinary retention

## 2019-06-22 NOTE — PROGRESS NOTES
Received patient at 1930  A/O x 4  Atrial paced on tele  PPM placed earlier in day  Left arm sling in place  Patient continues to retain urine  Straight cath required as patient's PVR remains 600+ each voiding occurrence  IVF NS at 50ml/hr  Will monitor Na

## 2019-06-22 NOTE — PROGRESS NOTES
Pt. Received as a transfer from CCU. VSS. Pt. States only mild pain at this time, denies the need for pain medicine. Pt. Saturating well on RA. A paced on tele. RFA IV flushing well, saline locked. Pt. Is up with SBA.  Monitoring urine output and bladder sc

## 2019-06-22 NOTE — PROGRESS NOTES
Patient received in chair. Doing well. Voided 200 and had 220 residual.  No downs needed due to PVR less than 400. Notified urology of this. Ok to d/c home without downs. CXR and interogation of PPM found to be unremarkable. OK'd for discharge.   While

## 2019-06-22 NOTE — PROGRESS NOTES
[facility]  Nephrology Progress Note    Sapphire Barger Attending:  [place]*       Assessment and Plan:    1) Hyponatremia- due to low solute intake (very low Na diet) exac by generous fluid intake; meds benign; TSH / cortisol WNL- resol Daily with breakfast   glucose (DEX4) oral liquid 15 g 15 g Oral Q15 Min PRN   Or      Glucose-Vitamin C (DEX-4) 4-6 GM-MG chewable tab 4 tablet 4 tablet Oral Q15 Min PRN   Or      dextrose 50 % injection 50 mL 50 mL Intravenous Q15 Min PRN   Or      gluco

## 2019-06-22 NOTE — PROGRESS NOTES
[facility]  Progress Note     Pasquale Ron Barger Patient Status:  Inpatient    1945 MRN [id number]   [facility] 6NE-A Attending Laura Hyatt [facility] Day # 3 PCP Mildred Mckeon DO     S: Patient seen and examined at bedside < > = values in this interval not displayed. Estimated Creatinine Clearance: 28.4 mL/min (A) (based on SCr of 1.29 mg/dL (H)).     Recent Labs   Lab 06/19/19  1243   PTP 13.3   INR 0.97       Recent Labs   Lab 06/19/19  1243   TROP <0.045 require TBD.

## 2019-06-23 VITALS
RESPIRATION RATE: 16 BRPM | HEART RATE: 73 BPM | OXYGEN SATURATION: 95 % | DIASTOLIC BLOOD PRESSURE: 50 MMHG | SYSTOLIC BLOOD PRESSURE: 164 MMHG | TEMPERATURE: 98 F | BODY MASS INDEX: 24.52 KG/M2 | WEIGHT: 129.88 LBS | HEIGHT: 61 IN

## 2019-06-23 LAB
ANION GAP SERPL CALC-SCNC: 10 MMOL/L (ref 0–18)
BUN BLD-MCNC: 35 MG/DL (ref 7–18)
BUN/CREAT SERPL: 26.9 (ref 10–20)
CALCIUM BLD-MCNC: 9.4 MG/DL (ref 8.5–10.1)
CHLORIDE SERPL-SCNC: 106 MMOL/L (ref 98–112)
CO2 SERPL-SCNC: 23 MMOL/L (ref 21–32)
CREAT BLD-MCNC: 1.3 MG/DL (ref 0.55–1.02)
GLUCOSE BLD-MCNC: 114 MG/DL (ref 70–99)
GLUCOSE BLD-MCNC: 121 MG/DL (ref 70–99)
OSMOLALITY SERPL CALC.SUM OF ELEC: 297 MOSM/KG (ref 275–295)
POTASSIUM SERPL-SCNC: 4.2 MMOL/L (ref 3.5–5.1)
SODIUM SERPL-SCNC: 139 MMOL/L (ref 136–145)

## 2019-06-23 PROCEDURE — 99232 SBSQ HOSP IP/OBS MODERATE 35: CPT | Performed by: INTERNAL MEDICINE

## 2019-06-23 PROCEDURE — 99239 HOSP IP/OBS DSCHRG MGMT >30: CPT | Performed by: HOSPITALIST

## 2019-06-23 NOTE — PLAN OF CARE
Assumed care of pt last evening at 1930 a/o x3 eating dinner with spouse bedside. Monitor shows sinus rhythm and BP stable. L upper chest incision is with mepilex foam and L arm is in a sling.   She is currently sleeping with her own CPAP on and  showi effectiveness of antiarrhythmic and heart rate control medications as ordered  - Initiate emergency measures for life threatening arrhythmias  - Monitor electrolytes and administer replacement therapy as ordered  Outcome: Progressing     Problem: Diabetes/

## 2019-06-23 NOTE — PROGRESS NOTES
[facility]  Progress Note     Buck Barger Patient Status:  Inpatient    1945 MRN [id number]   [facility] 6NE-A Attending [facility] Day # 4 PCP Paulino Marquez DO     S: Patient seen and examined at bedside --   --    AST 21  --   --   --   --    ALT 26  --   --   --   --    BILT 0.6  --   --   --   --    TP 5.6*  --   --   --   --     < > = values in this interval not displayed.        Estimated Creatinine Clearance: 28.2 mL/min (A) (based on SCr of 1.3 mg/dL patients current state of illness, I anticipate that, after discharge, patient will require TBD.

## 2019-06-23 NOTE — PROGRESS NOTES
Urinary drainage bag switched to leg bag. Educated on Schwab catheter care.  Explained discharge instructions including medications and follow ups to the patient and her , verbalize understanding, IV removed, tele monitor discontinued, will be transpo

## 2019-06-23 NOTE — PROGRESS NOTES
[facility]  Nephrology Progress Note    River Barger Attending:  Shannan Kennedy*       Assessment and Plan:    1) Hyponatremia- due to low solute intake (very low Na diet) exac by generous fluid intake; meds benign; TSH / cortisol WNL- resol imaging studies reviewed.     Meds:     Current Facility-Administered Medications:  glucose (DEX4) oral liquid 15 g 15 g Oral Q15 Min PRN   Or      Glucose-Vitamin C (DEX-4) 4-6 GM-MG chewable tab 4 tablet 4 tablet Oral Q15 Min PRN   Or      dextrose 50 % i

## 2019-06-23 NOTE — PROGRESS NOTES
[address] [facility] Patient Status:  Inpatient    1945 MRN [id number]   [facility] 8NE-A Attending [facility] Day # 4 PCP Jonas Jacobsen DO     Subjective:   Interval History: has complaints dizziness ye

## 2019-06-23 NOTE — PLAN OF CARE
Pt A&O x 4, sinus rhythm on tele, SPO2 95% on RA, up with standby assist with walker/cane. Left upper chest incision with mepilex, CDI, soft on palpation, L arm sling in place. C/o mild incisional pain, PRN pain medication administered, verbalize relief.  D Monitor intake/output and perform bladder scan as needed  - Follow urinary retention protocol/standard of care  - Consider collaborating with pharmacy to review patient's medication profile  - Implement strategies to promote bladder emptying  Outcome: Prog

## 2019-06-24 ENCOUNTER — PATIENT OUTREACH (OUTPATIENT)
Dept: CASE MANAGEMENT | Age: 74
End: 2019-06-24

## 2019-06-24 RX ORDER — BUMETANIDE 1 MG/1
TABLET ORAL
Qty: 180 TABLET | Refills: 0 | Status: SHIPPED | OUTPATIENT
Start: 2019-06-24 | End: 2020-01-01

## 2019-06-25 ENCOUNTER — TELEPHONE (OUTPATIENT)
Dept: CARDIOLOGY | Age: 74
End: 2019-06-25

## 2019-06-26 NOTE — DISCHARGE SUMMARY
[facility]  DISCHARGE SUMMARY     Rod Bargre Patient Status:  Inpatient    1945 MRN [id number]   [facility] 8NE-A Attending No att. providers found   Hosp Day # 4 PCP Deonna Payment, DO     Date of Admission: 2019  Date of • NOne    Incidental or significant findings and recommendations (brief dNoneescriptions):  • None    Lab/Test results pending at Discharge:   · NOne    Consultants:  • Nephrology- Dr. Fitzpatrick [facility]        Cardiology- Dr. Elizabeth Moulton    Discharge Medication List: physician assistants    Brinda Kulkarni MD  [facility] [phone]    In 1 week  see a Nurse in the [place] [id number] in Dr. Sesar Garcia office 7-10 days    Appointments for Next 30 Days 6/25/2019 - 7/25/2019      Date Arrival sensitive equipment and to ensure the privacy of all our patients, children will not be permitted in the exam rooms, unless otherwise noted and in accordance with departmental policy.   PATIENT RESPONSIBILITY ESTIMATE  - (Estimate) We will provide you with

## 2019-06-28 ENCOUNTER — TELEPHONE (OUTPATIENT)
Dept: NEPHROLOGY | Facility: CLINIC | Age: 74
End: 2019-06-28

## 2019-06-28 NOTE — TELEPHONE ENCOUNTER
Pt called & said Dr. Rita Anderson has settled on bumetanide dose of 1 mg. She takes two pills per day-one in am & one in pm.  She needs a 90 day supply sent to [facility].

## 2019-07-01 ENCOUNTER — ANCILLARY PROCEDURE (OUTPATIENT)
Dept: CARDIOLOGY | Age: 74
End: 2019-07-01
Attending: INTERNAL MEDICINE

## 2019-07-01 ENCOUNTER — TELEPHONE (OUTPATIENT)
Dept: CARDIOLOGY | Age: 74
End: 2019-07-01

## 2019-07-01 VITALS
SYSTOLIC BLOOD PRESSURE: 126 MMHG | HEIGHT: 62 IN | WEIGHT: 129.8 LBS | BODY MASS INDEX: 23.89 KG/M2 | HEART RATE: 60 BPM | DIASTOLIC BLOOD PRESSURE: 60 MMHG

## 2019-07-01 DIAGNOSIS — Z45.018 PACEMAKER REPROGRAMMING/CHECK: ICD-10-CM

## 2019-07-01 DIAGNOSIS — Z95.0 CARDIAC PACEMAKER: Primary | ICD-10-CM

## 2019-07-01 PROCEDURE — 93280 PM DEVICE PROGR EVAL DUAL: CPT | Performed by: INTERNAL MEDICINE

## 2019-07-02 RX ORDER — BUMETANIDE 1 MG/1
1 TABLET ORAL
Qty: 180 TABLET | Refills: 1 | Status: SHIPPED | OUTPATIENT
Start: 2019-07-02 | End: 2019-11-16

## 2019-07-08 ENCOUNTER — HOSPITAL ENCOUNTER (OUTPATIENT)
Dept: CARDIOLOGY CLINIC | Facility: HOSPITAL | Age: 74
Discharge: HOME | End: 2019-07-08
Attending: INTERNAL MEDICINE
Payer: MEDICARE

## 2019-07-08 DIAGNOSIS — I75.023 ATHEROEMBOLISM OF BOTH LOWER EXTREMITIES (HCC): ICD-10-CM

## 2019-07-08 DIAGNOSIS — I77.9 PERIPHERAL ARTERIAL OCCLUSIVE DISEASE (HCC): ICD-10-CM

## 2019-07-08 PROCEDURE — 93925 LOWER EXTREMITY STUDY: CPT | Performed by: INTERNAL MEDICINE

## 2019-07-12 ENCOUNTER — TELEPHONE (OUTPATIENT)
Dept: CARDIOLOGY | Age: 74
End: 2019-07-12

## 2019-07-12 DIAGNOSIS — I73.9 CLAUDICATION (CMD): ICD-10-CM

## 2019-07-12 DIAGNOSIS — I77.9 PERIPHERAL ARTERIAL OCCLUSIVE DISEASE (CMD): Primary | ICD-10-CM

## 2019-07-16 RX ORDER — CLOPIDOGREL BISULFATE 75 MG/1
75 TABLET ORAL DAILY
Qty: 90 TABLET | Refills: 2 | Status: SHIPPED | OUTPATIENT
Start: 2019-07-16 | End: 2020-01-01

## 2019-07-21 ENCOUNTER — TELEPHONE (OUTPATIENT)
Dept: CARDIOLOGY | Age: 74
End: 2019-07-21

## 2019-07-22 RX ORDER — BUMETANIDE 1 MG/1
TABLET ORAL
Qty: 60 TABLET | Refills: 0 | OUTPATIENT
Start: 2019-07-22

## 2019-08-27 ENCOUNTER — OFFICE VISIT (OUTPATIENT)
Dept: FAMILY MEDICINE CLINIC | Facility: CLINIC | Age: 74
End: 2019-08-27
Payer: MEDICARE

## 2019-08-27 ENCOUNTER — TELEPHONE (OUTPATIENT)
Dept: FAMILY MEDICINE CLINIC | Facility: CLINIC | Age: 74
End: 2019-08-27

## 2019-08-27 VITALS
TEMPERATURE: 98 F | BODY MASS INDEX: 23.86 KG/M2 | HEIGHT: 61.5 IN | WEIGHT: 128 LBS | SYSTOLIC BLOOD PRESSURE: 122 MMHG | HEART RATE: 65 BPM | RESPIRATION RATE: 16 BRPM | OXYGEN SATURATION: 95 % | DIASTOLIC BLOOD PRESSURE: 48 MMHG

## 2019-08-27 DIAGNOSIS — L03.116 CELLULITIS OF LEFT HEEL: ICD-10-CM

## 2019-08-27 DIAGNOSIS — N93.9 VAGINAL BLEEDING: ICD-10-CM

## 2019-08-27 DIAGNOSIS — R82.90 CLOUDY URINE: ICD-10-CM

## 2019-08-27 DIAGNOSIS — E08.42 DIABETIC POLYNEUROPATHY ASSOCIATED WITH DIABETES MELLITUS DUE TO UNDERLYING CONDITION (HCC): Primary | ICD-10-CM

## 2019-08-27 DIAGNOSIS — N89.8 VAGINAL DISCHARGE: ICD-10-CM

## 2019-08-27 LAB
APPEARANCE: CLEAR
BILIRUBIN: NEGATIVE
GLUCOSE (URINE DIPSTICK): 250 MG/DL
KETONES (URINE DIPSTICK): NEGATIVE MG/DL
MULTISTIX LOT#: NORMAL NUMERIC
NITRITE, URINE: POSITIVE
OCCULT BLOOD: NEGATIVE
PH, URINE: 5.5 (ref 4.5–8)
PROTEIN (URINE DIPSTICK): 100 MG/DL
SPECIFIC GRAVITY: 1.01 (ref 1–1.03)
URINE-COLOR: YELLOW
UROBILINOGEN,SEMI-QN: 0.2 MG/DL (ref 0–1.9)

## 2019-08-27 PROCEDURE — 87186 SC STD MICRODIL/AGAR DIL: CPT | Performed by: FAMILY MEDICINE

## 2019-08-27 PROCEDURE — 81003 URINALYSIS AUTO W/O SCOPE: CPT | Performed by: FAMILY MEDICINE

## 2019-08-27 PROCEDURE — 87086 URINE CULTURE/COLONY COUNT: CPT | Performed by: FAMILY MEDICINE

## 2019-08-27 PROCEDURE — 87077 CULTURE AEROBIC IDENTIFY: CPT | Performed by: FAMILY MEDICINE

## 2019-08-27 PROCEDURE — 99214 OFFICE O/P EST MOD 30 MIN: CPT | Performed by: FAMILY MEDICINE

## 2019-08-27 RX ORDER — CEPHALEXIN 500 MG/1
1000 CAPSULE ORAL 2 TIMES DAILY
Qty: 28 CAPSULE | Refills: 0 | Status: SHIPPED | OUTPATIENT
Start: 2019-08-27 | End: 2019-09-03

## 2019-08-27 NOTE — TELEPHONE ENCOUNTER
Dr. Ania Combs,  Patient saw you 5/28/19 for left heel cellulitis. Given Cephalexin with a refill. Heel did get better but never fully healed. For the past 7-10 days heel again developed a sore but it never opened up - patient used refill for Cephalexin.  Want

## 2019-08-27 NOTE — PROGRESS NOTES
HPI:   Niki Young is a 75year old female who presents for vaginal and heel concerns     S/p renal eval ; pt was put on bumex with good results   Pt was noted to have urinary retention so a catheter placed  Pt still having pain at site  Occurred after tr • Disorder of liver     enzymes were elevated recently   • Elevated cholesterol    • Elevated hemoglobin A1c    • Fibromyalgia    • Heart attack ([facility] 03/2008    stents x2 at that time.     • Heart disease    • High blood pressure    • High cholesterol [address]   • TONSILLECTOMY        Social History: Social History    Tobacco Use      Smoking status: Former Smoker        Packs/day: 2.50        Years: 9.00        Pack years: 22.5        Quit date: 1972        Years since quittin.3 CULTURE, ROUTINE; Future  - URINE CULTURE, ROUTINE    4. Cloudy urine    - URINALYSIS, ROUTINE; Future  - URINE CULTURE, ROUTINE; Future  - URINE CULTURE, ROUTINE; Future  - URINE CULTURE, ROUTINE    5.  Diabetic polyneuropathy associated with diabetes carlin

## 2019-08-27 NOTE — TELEPHONE ENCOUNTER
Pt is asking for some med previously prescribed by Dr. Teresa Patiño for sore on foot. Pt has not been seen here since June.   Pls call to discuss

## 2019-09-03 PROCEDURE — 87186 SC STD MICRODIL/AGAR DIL: CPT | Performed by: UROLOGY

## 2019-09-03 PROCEDURE — 87088 URINE BACTERIA CULTURE: CPT | Performed by: UROLOGY

## 2019-09-03 PROCEDURE — 87086 URINE CULTURE/COLONY COUNT: CPT | Performed by: UROLOGY

## 2019-09-11 ENCOUNTER — OFFICE VISIT (OUTPATIENT)
Dept: FAMILY MEDICINE CLINIC | Facility: CLINIC | Age: 74
End: 2019-09-11
Payer: MEDICARE

## 2019-09-11 ENCOUNTER — HOSPITAL ENCOUNTER (INPATIENT)
Facility: HOSPITAL | Age: 74
LOS: 1 days | Discharge: HOME | DRG: 300 | End: 2019-09-13
Attending: EMERGENCY MEDICINE | Admitting: HOSPITALIST
Payer: MEDICARE

## 2019-09-11 ENCOUNTER — APPOINTMENT (OUTPATIENT)
Dept: ULTRASOUND IMAGING | Facility: HOSPITAL | Age: 74
DRG: 300 | End: 2019-09-11
Attending: EMERGENCY MEDICINE
Payer: MEDICARE

## 2019-09-11 ENCOUNTER — TELEPHONE (OUTPATIENT)
Dept: FAMILY MEDICINE CLINIC | Facility: CLINIC | Age: 74
End: 2019-09-11

## 2019-09-11 VITALS
HEIGHT: 61.5 IN | SYSTOLIC BLOOD PRESSURE: 144 MMHG | HEART RATE: 65 BPM | RESPIRATION RATE: 16 BRPM | WEIGHT: 135 LBS | TEMPERATURE: 98 F | BODY MASS INDEX: 25.16 KG/M2 | DIASTOLIC BLOOD PRESSURE: 80 MMHG

## 2019-09-11 DIAGNOSIS — M79.89 LEFT ARM SWELLING: ICD-10-CM

## 2019-09-11 DIAGNOSIS — T14.8XXA OPEN WOUND OF SKIN: ICD-10-CM

## 2019-09-11 DIAGNOSIS — I82.C12 INTERNAL JUGULAR VEIN THROMBOSIS, LEFT (HCC): Primary | ICD-10-CM

## 2019-09-11 DIAGNOSIS — L53.9: ICD-10-CM

## 2019-09-11 DIAGNOSIS — R59.0 SUPRACLAVICULAR LYMPHADENOPATHY: Primary | ICD-10-CM

## 2019-09-11 PROBLEM — E11.9 TYPE 2 DIABETES MELLITUS WITHOUT COMPLICATION, WITHOUT LONG-TERM CURRENT USE OF INSULIN (HCC): Status: ACTIVE | Noted: 2017-04-20

## 2019-09-11 LAB
ANION GAP SERPL CALC-SCNC: 10 MMOL/L (ref 0–18)
APTT PPP: 34.1 SECONDS (ref 25.4–36.1)
BASOPHILS # BLD AUTO: 0.09 X10(3) UL (ref 0–0.2)
BASOPHILS NFR BLD AUTO: 0.4 %
BUN BLD-MCNC: 68 MG/DL (ref 7–18)
BUN/CREAT SERPL: 41 (ref 10–20)
CALCIUM BLD-MCNC: 8.9 MG/DL (ref 8.5–10.1)
CHLORIDE SERPL-SCNC: 101 MMOL/L (ref 98–112)
CO2 SERPL-SCNC: 25 MMOL/L (ref 21–32)
CREAT BLD-MCNC: 1.66 MG/DL (ref 0.55–1.02)
DEPRECATED RDW RBC AUTO: 50.2 FL (ref 35.1–46.3)
EOSINOPHIL # BLD AUTO: 2.86 X10(3) UL (ref 0–0.7)
EOSINOPHIL NFR BLD AUTO: 12.4 %
ERYTHROCYTE [DISTWIDTH] IN BLOOD BY AUTOMATED COUNT: 14.3 % (ref 11–15)
GLUCOSE BLD-MCNC: 172 MG/DL (ref 70–99)
GLUCOSE BLD-MCNC: 270 MG/DL (ref 70–99)
HCT VFR BLD AUTO: 33.1 % (ref 35–48)
HGB BLD-MCNC: 10.9 G/DL (ref 12–16)
IMM GRANULOCYTES # BLD AUTO: 0.17 X10(3) UL (ref 0–1)
IMM GRANULOCYTES NFR BLD: 0.7 %
INR BLD: 1.11 (ref 0.9–1.1)
LYMPHOCYTES # BLD AUTO: 1.31 X10(3) UL (ref 1–4)
LYMPHOCYTES NFR BLD AUTO: 5.7 %
MCH RBC QN AUTO: 31.9 PG (ref 26–34)
MCHC RBC AUTO-ENTMCNC: 32.9 G/DL (ref 31–37)
MCV RBC AUTO: 96.8 FL (ref 80–100)
MONOCYTES # BLD AUTO: 0.41 X10(3) UL (ref 0.1–1)
MONOCYTES NFR BLD AUTO: 1.8 %
NEUTROPHILS # BLD AUTO: 18.14 X10 (3) UL (ref 1.5–7.7)
NEUTROPHILS # BLD AUTO: 18.14 X10(3) UL (ref 1.5–7.7)
NEUTROPHILS NFR BLD AUTO: 79 %
OSMOLALITY SERPL CALC.SUM OF ELEC: 311 MOSM/KG (ref 275–295)
PLATELET # BLD AUTO: 303 10(3)UL (ref 150–450)
PLATELET MORPHOLOGY: NORMAL
POTASSIUM SERPL-SCNC: 4.1 MMOL/L (ref 3.5–5.1)
PSA SERPL DL<=0.01 NG/ML-MCNC: 14.8 SECONDS (ref 12.5–14.7)
RBC # BLD AUTO: 3.42 X10(6)UL (ref 3.8–5.3)
SODIUM SERPL-SCNC: 136 MMOL/L (ref 136–145)
WBC # BLD AUTO: 23 X10(3) UL (ref 4–11)

## 2019-09-11 PROCEDURE — 99220 INITIAL OBSERVATION CARE,LEVL III: CPT | Performed by: HOSPITALIST

## 2019-09-11 PROCEDURE — 93971 EXTREMITY STUDY: CPT | Performed by: EMERGENCY MEDICINE

## 2019-09-11 PROCEDURE — 99214 OFFICE O/P EST MOD 30 MIN: CPT | Performed by: PHYSICIAN ASSISTANT

## 2019-09-11 RX ORDER — CIPROFLOXACIN 250 MG/1
250 TABLET, FILM COATED ORAL 2 TIMES DAILY
Status: COMPLETED | OUTPATIENT
Start: 2019-09-11 | End: 2019-09-12

## 2019-09-11 RX ORDER — HEPARIN SODIUM AND DEXTROSE 10000; 5 [USP'U]/100ML; G/100ML
INJECTION INTRAVENOUS CONTINUOUS
Status: DISCONTINUED | OUTPATIENT
Start: 2019-09-11 | End: 2019-09-13

## 2019-09-11 RX ORDER — GARLIC EXTRACT 500 MG
1 CAPSULE ORAL DAILY
Status: DISCONTINUED | OUTPATIENT
Start: 2019-09-12 | End: 2019-09-13

## 2019-09-11 RX ORDER — DEXTROSE MONOHYDRATE 25 G/50ML
50 INJECTION, SOLUTION INTRAVENOUS
Status: DISCONTINUED | OUTPATIENT
Start: 2019-09-11 | End: 2019-09-13

## 2019-09-11 RX ORDER — CLOPIDOGREL BISULFATE 75 MG/1
75 TABLET ORAL DAILY
Status: DISCONTINUED | OUTPATIENT
Start: 2019-09-12 | End: 2019-09-13

## 2019-09-11 RX ORDER — METOPROLOL SUCCINATE 50 MG/1
50 TABLET, EXTENDED RELEASE ORAL
Status: DISCONTINUED | OUTPATIENT
Start: 2019-09-12 | End: 2019-09-11

## 2019-09-11 RX ORDER — HEPARIN SODIUM 5000 [USP'U]/ML
80 INJECTION INTRAVENOUS; SUBCUTANEOUS ONCE
Status: COMPLETED | OUTPATIENT
Start: 2019-09-11 | End: 2019-09-11

## 2019-09-11 RX ORDER — ONDANSETRON 2 MG/ML
4 INJECTION INTRAMUSCULAR; INTRAVENOUS EVERY 6 HOURS PRN
Status: DISCONTINUED | OUTPATIENT
Start: 2019-09-11 | End: 2019-09-13

## 2019-09-11 RX ORDER — HEPARIN SODIUM AND DEXTROSE 10000; 5 [USP'U]/100ML; G/100ML
18 INJECTION INTRAVENOUS ONCE
Status: COMPLETED | OUTPATIENT
Start: 2019-09-11 | End: 2019-09-12

## 2019-09-11 RX ORDER — METOPROLOL SUCCINATE 50 MG/1
50 TABLET, EXTENDED RELEASE ORAL NIGHTLY
Status: DISCONTINUED | OUTPATIENT
Start: 2019-09-11 | End: 2019-09-13

## 2019-09-11 RX ORDER — ACETAMINOPHEN 325 MG/1
650 TABLET ORAL EVERY 6 HOURS PRN
Status: DISCONTINUED | OUTPATIENT
Start: 2019-09-11 | End: 2019-09-13

## 2019-09-11 NOTE — ED INITIAL ASSESSMENT (HPI)
Patient c/o a lump to her neck.  + redness and swelling. Reports she had a patch on after pacemaker placement, it broke some skin open per patient.   Sent for possible IV antibiotics  Denies difficulty swallowing or breathing

## 2019-09-11 NOTE — ED NOTES
Pt noted a stiff neck on Monday and then noted a lump last night while putting cream on neck. Redness noted on left side of neck. Pt is speaking clearly. Respirations equal and nonlabored. Pt is a&ox3. No fever.

## 2019-09-11 NOTE — ED PROVIDER NOTES
Patient Seen in: [facility] Emergency Department    History   Patient presents with:  Cellulitis (integumentary, infectious)    Stated Complaint: left supraclavicular swelling, redness.  possible need for IV abx     HPI    Patient sent from [facility] BYPASS SURGERY     •      • CABG  2018   • CATARACT     • CATH DRUG ELUTING STENT     • CATH PERCUTANEOUS  TRANSLUMINAL CORONARY ANGIOPLASTY      cardiac and peripheral   • CHOLECYSTECTOMY  '   • FEMORAL PERIPHERAL BYPASS Right 2/10/2015 Temp 98.3 °F (36.8 °C) (Temporal)   Resp 18   Ht 156.2 cm (5' 1.5\")   Wt 60.8 kg   SpO2 98%   BMI 24.91 kg/m²         Physical Exam      Constitutional: No distress. Appears well-developed and well-nourished. Head: Normocephalic and atraumatic.    Nose: Abnormal; Notable for the following components:    WBC 23.0 (*)     RBC 3.42 (*)     HGB 10.9 (*)     HCT 33.1 (*)     RDW-SD 50.2 (*)     Neutrophil Absolute Prelim 18.14 (*)     Neutrophil Absolute 18.14 (*)     Eosinophil Absolute 2.86 (*)     All other diagnosis)    Disposition:  Admit  9/11/2019  5:27 pm    Follow-up:  No follow-up provider specified.       Medications Prescribed:  Current Discharge Medication List              Present on Admission  Date Reviewed: 9/11/2019          ICD-10-CM Noted POA

## 2019-09-11 NOTE — TELEPHONE ENCOUNTER
Anne's whole neck is hurting. She said that when she went to go put Aspercreme ointment on it last night, she felt a lump on her left side of her neck that is about 2 in a half inches by an inch. It can also make her arm hurt.   She wants to know  What Dr Aurelio Malave

## 2019-09-11 NOTE — PROGRESS NOTES
HPI:    Patient ID: Ramiro Garcia is a 75year old female.     HPI   Patient with history of CKD, HTN, TASHA, DM2, and complicated cardiovascular history including CAD s/p CABG and stenting and placement of pacemaker 6/2019 here with concerns of left neck pain bumetanide 1 MG Oral Tab Take 1 tablet (1 mg total) by mouth BID (Diuretic). (Patient taking differently: Take 1 mg by mouth BID (Diuretic).   ) Disp: 180 tablet Rfl: 1   Metoprolol Succinate ER 50 MG Oral Tablet 24 Hr Take 1 tablet (50 mg total) by mouth d HIGH BLOOD SUGAR  Lyrica [Pregabalin]         Comment:Tremor, dizziness  Naloxone                OTHER (SEE COMMENTS)    Comment:opioid  Tylenol With Codeine    OTHER (SEE COMMENTS)    Comment: Oral,             Migraine headache   PHYSICAL EXAM Patient here with 3 days of left neck pain, erythema, and swelling. This morning noticed 2 small openings in the skin over site of pacemaker which was placed 3 months ago. Patient afebrile.  Exam notable for large palpable, mobile, erythematous supraclavicu

## 2019-09-12 ENCOUNTER — APPOINTMENT (OUTPATIENT)
Dept: CT IMAGING | Facility: HOSPITAL | Age: 74
DRG: 300 | End: 2019-09-12
Attending: INTERNAL MEDICINE
Payer: MEDICARE

## 2019-09-12 PROBLEM — N18.30 CKD (CHRONIC KIDNEY DISEASE) STAGE 3, GFR 30-59 ML/MIN (HCC): Status: ACTIVE | Noted: 2019-06-12

## 2019-09-12 LAB
ANION GAP SERPL CALC-SCNC: 9 MMOL/L (ref 0–18)
APTT PPP: 149.6 SECONDS (ref 25.4–36.1)
APTT PPP: 62 SECONDS (ref 25.4–36.1)
APTT PPP: 80.1 SECONDS (ref 25.4–36.1)
BUN BLD-MCNC: 55 MG/DL (ref 7–18)
BUN/CREAT SERPL: 39 (ref 10–20)
CALCIUM BLD-MCNC: 8.6 MG/DL (ref 8.5–10.1)
CHLORIDE SERPL-SCNC: 109 MMOL/L (ref 98–112)
CO2 SERPL-SCNC: 23 MMOL/L (ref 21–32)
CREAT BLD-MCNC: 1.41 MG/DL (ref 0.55–1.02)
DEPRECATED HBV CORE AB SER IA-ACNC: 226.9 NG/ML (ref 18–340)
DEPRECATED RDW RBC AUTO: 51.8 FL (ref 35.1–46.3)
ERYTHROCYTE [DISTWIDTH] IN BLOOD BY AUTOMATED COUNT: 14.4 % (ref 11–15)
GLUCOSE BLD-MCNC: 172 MG/DL (ref 70–99)
GLUCOSE BLD-MCNC: 178 MG/DL (ref 70–99)
GLUCOSE BLD-MCNC: 179 MG/DL (ref 70–99)
GLUCOSE BLD-MCNC: 211 MG/DL (ref 70–99)
HCT VFR BLD AUTO: 32.1 % (ref 35–48)
HGB BLD-MCNC: 10.7 G/DL (ref 12–16)
HGB RETIC QN AUTO: 33.7 PG (ref 28.2–36.6)
IMM RETICS NFR: 0.07 RATIO (ref 0.1–0.3)
IRON SATURATION: 22 % (ref 15–50)
IRON SERPL-MCNC: 42 UG/DL (ref 50–170)
LDH SERPL L TO P-CCNC: 384 U/L (ref 84–246)
MCH RBC QN AUTO: 32.8 PG (ref 26–34)
MCHC RBC AUTO-ENTMCNC: 33.3 G/DL (ref 31–37)
MCV RBC AUTO: 98.5 FL (ref 80–100)
OSMOLALITY SERPL CALC.SUM OF ELEC: 312 MOSM/KG (ref 275–295)
PLATELET # BLD AUTO: 308 10(3)UL (ref 150–450)
POTASSIUM SERPL-SCNC: 4.2 MMOL/L (ref 3.5–5.1)
PROCALCITONIN SERPL-MCNC: 0.41 NG/ML
RBC # BLD AUTO: 3.26 X10(6)UL (ref 3.8–5.3)
RETICS # AUTO: 106.6 X10(3) UL (ref 22.5–147.5)
RETICS/RBC NFR AUTO: 3.3 % (ref 0.5–2.5)
SODIUM SERPL-SCNC: 141 MMOL/L (ref 136–145)
TOTAL IRON BINDING CAPACITY: 195 UG/DL (ref 240–450)
TRANSFERRIN SERPL-MCNC: 131 MG/DL (ref 200–360)
VIT B12 SERPL-MCNC: >2000 PG/ML (ref 193–986)
WBC # BLD AUTO: 20.5 X10(3) UL (ref 4–11)

## 2019-09-12 PROCEDURE — 71275 CT ANGIOGRAPHY CHEST: CPT | Performed by: INTERNAL MEDICINE

## 2019-09-12 PROCEDURE — 99225 SUBSEQUENT OBSERVATION CARE: CPT | Performed by: INTERNAL MEDICINE

## 2019-09-12 PROCEDURE — 99222 1ST HOSP IP/OBS MODERATE 55: CPT | Performed by: INTERNAL MEDICINE

## 2019-09-12 NOTE — CONSULTS
[facility]  Report of Inpatient Wound Care Consultation     [address] [place]. Patient Status:  Observation    1945 MRN [id number]   [facility] 7NE-A Attending Claire Arnold MD   Hosp Day # 0 PCP Augutso Causey [facility] 2014    Rt common fem arthroplasry, stent   • ANGIOPLASTY (CORONARY)  5/12/15    right popliteal   • BYPASS SURGERY     •      • CABG  2018   • CATARACT     • CATH DRUG ELUTING STENT     • CATH PERCUTANEOUS  TRANSLUMINAL CORONARY ANGIOPLAS 8.9  --   --  8.6  --    PTT 34.1  --  149.6* 62.0*  --    INR 1.11*  --   --   --   --    B12  --   --   --  >2,000*  --    PGLU  --  172*  --   --  179*       Imaging:   See EMR  Microbiology:   Cultures taken by team, results pending    ASSESSMENT/ RAZIA

## 2019-09-12 NOTE — CONSULTS
[facility]  Report of Consultation    Claudene Hails Patient Status:  Observation    1945 MRN [id number]   [facility] 7NE-A Attending Richy Medrano MD   [facility] Day # 0 PCP Carolina Hurtado DO     Reason for Consultation:    The patient apnea     CPAP   • Unspecified essential hypertension    • Visual impairment     scar on left retina; glasses       Past Surgical History:   Procedure Laterality Date   • ABLATION      venous   • ANGIOGRAM     • ANGIOPLASTY (CORONARY)  5/2014    RCA, ramus COMMENTS)    Comment:Feet turn black, muscle weakness in the legs             CLASS, feet turned black             Feet turn black, muscle weakness in the legs  Warfarin                OTHER (SEE COMMENTS)    Comment:Pt says she developed sores tablet, 8 tablet, Oral, Q15 Min PRN  •  acetaminophen (TYLENOL) tab 650 mg, 650 mg, Oral, Q6H PRN  •  ondansetron HCl (ZOFRAN) injection 4 mg, 4 mg, Intravenous, Q6H PRN  •  Insulin Aspart Pen (NOVOLOG) 100 UNIT/ML flexpen 1-5 Units, 1-5 Units, Subcutaneou throughout in the cervical, supraclavicular, or axillary regions.       Laboratory Data reviewed at this visit:    Recent Labs   Lab 09/11/19  1459 09/12/19  0739   RBC 3.42* 3.26*   HGB 10.9* 10.7*   HCT 33.1* 32.1*   MCV 96.8 98.5   MCH 31.9 32.8   MCHC 3 current use of insulin ([address]     Spinal stenosis of lumbar region     Spondylolisthesis of lumbar region     Mild nonproliferative diabetic retinopathy of left eye without macular edema associated with type 2 diabetes mellitus ([address]     PAOD (peripheral ar

## 2019-09-12 NOTE — CONSULTS
[facility]  Report of Consultation    Paige Sahu Patient Status:  Observation    1945 MRN [id number]   [facility] 7NE-A Attending Ileana Hardy MD   Hosp Day # 0 PCP Pecae Kauffman DO       Assessment / Plan:     1) CKD 3- du transfusions last one about 2015 or so.    • HTN (hypertension) 7/11/2013   • Intermittent claudication [facility])    • Neuropathy    • Osteoarthritis    • Peripheral vascular disease (HCC)     stents and bypass right leg, 2/2015   • Proteinuria    • Sleep apnea smoking about 47 years ago. She has a 22.50 pack-year smoking history. She has quit using smokeless tobacco. She reports that she drinks alcohol. She reports that she has current or past drug history.     Allergies:    Statins                 OTHER (SEE COM Glucose-Vitamin C (DEX-4) chewable tab 4 tablet, 4 tablet, Oral, Q15 Min PRN **OR** dextrose 50 % injection 50 mL, 50 mL, Intravenous, Q15 Min PRN **OR** glucose (DEX4) oral liquid 30 g, 30 g, Oral, Q15 Min PRN **OR** Glucose-Vitamin C (DEX-4) chewable tab 09/12/2019     09/12/2019    K 4.2 09/12/2019     09/12/2019    CO2 23.0 09/12/2019     09/12/2019    CA 8.6 09/12/2019    PTT 62.0 09/12/2019    INR 1.11 09/11/2019    PTP 14.8 09/11/2019    B12 >2,000 09/12/2019    PGLU 179 09/12/2019

## 2019-09-12 NOTE — H&P
[facility]PITAL[facility]  History and Physical     Rafat Barger Patient Status:  Emergency    1945 MRN [id number]   Location [address] [facility] Attending Kervin Lawler, [address] [facility] Se Day # 0 PCP Leah Reasons, DO     Chief Complaint: L suprac scar on left retina; glasses        Past Surgical History:   Past Surgical History:   Procedure Laterality Date   • ABLATION      venous   • ANGIOGRAM     • ANGIOPLASTY (CORONARY)  5/2014    RCA, ramus   • ANGIOPLASTY (CORONARY)  2007    LAD,RCA   • AN in the legs             CLASS, feet turned black             Feet turn black, muscle weakness in the legs  Warfarin                OTHER (SEE COMMENTS)    Comment:Pt says she developed sores             Oral, coumadin necrosis             Pt says she devel by mouth daily. Disp:  Rfl:    acetaminophen 500 MG Oral Tab Take 500 mg by mouth every 6 (six) hours as needed for Pain. Disp:  Rfl:    Clopidogrel Bisulfate 75 MG Oral Tab Take 1 tablet (75 mg total) by mouth daily.  Disp: 30 tablet Rfl: 11   Specialty Vi mg/dL (H)). Recent Labs   Lab 09/11/19  1459   PTP 14.8*   INR 1.11*       No results for input(s): TROP, CK in the last 168 hours. Imaging: Imaging data reviewed in Epic.       ASSESSMENT / PLAN:     1. 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

## 2019-09-12 NOTE — PROGRESS NOTES
[facility]  Progress Note     Buddy Barger Patient Status:  Observation    1945 MRN [id number]   [facility] 7NE-A Attending Addy Fitzpatrick MD   Hosp Day # 0 PCP Gail Doyle DO     Chief Complaint: L neck swelling    S: CAIJFZ • Clopidogrel Bisulfate  75 mg Oral Daily   • Acidophilus/Pectin  1 capsule Oral Daily   • Insulin Aspart Pen  1-5 Units Subcutaneous TID CC and HS   • Metoprolol Succinate ER  50 mg Oral Nightly       ASSESSMENT / PLAN:     1. L supraclavicular mass 2/2

## 2019-09-13 VITALS
HEART RATE: 60 BPM | WEIGHT: 132.31 LBS | HEIGHT: 61.5 IN | TEMPERATURE: 99 F | RESPIRATION RATE: 16 BRPM | SYSTOLIC BLOOD PRESSURE: 148 MMHG | BODY MASS INDEX: 24.66 KG/M2 | DIASTOLIC BLOOD PRESSURE: 40 MMHG | OXYGEN SATURATION: 95 %

## 2019-09-13 LAB
APTT PPP: 110.3 SECONDS (ref 25.4–36.1)
APTT PPP: 80.2 SECONDS (ref 25.4–36.1)
GLUCOSE BLD-MCNC: 130 MG/DL (ref 70–99)
GLUCOSE BLD-MCNC: 162 MG/DL (ref 70–99)
GLUCOSE BLD-MCNC: 168 MG/DL (ref 70–99)
PLATELET # BLD AUTO: 315 10(3)UL (ref 150–450)

## 2019-09-13 PROCEDURE — 99217 OBSERVATION CARE DISCHARGE: CPT | Performed by: INTERNAL MEDICINE

## 2019-09-13 PROCEDURE — 99225 SUBSEQUENT OBSERVATION CARE: CPT | Performed by: NURSE PRACTITIONER

## 2019-09-13 PROCEDURE — 99232 SBSQ HOSP IP/OBS MODERATE 35: CPT | Performed by: INTERNAL MEDICINE

## 2019-09-13 RX ORDER — HEPARIN SODIUM AND DEXTROSE 10000; 5 [USP'U]/100ML; G/100ML
INJECTION INTRAVENOUS CONTINUOUS
Status: ACTIVE | OUTPATIENT
Start: 2019-09-13 | End: 2019-09-13

## 2019-09-13 NOTE — PROGRESS NOTES
Hem/Onc Inpatient  Note    Patient Name: Niki Young   YOB: 1945   Medical Record Number: [id number]   CSN: [id number]   Attending Physician: Dr. Stef Maria     Chief Complaint: Left IJ/subclavian vein, mediastinal lymphadenopathy     S: Patient on Succinate ER  50 mg Oral Nightly       PRN Medications:  glucose **OR** Glucose-Vitamin C **OR** dextrose **OR** glucose **OR** Glucose-Vitamin C, acetaminophen, ondansetron HCl      Review of Systems: A comprehensive 10 point review of systems was complet Blood Culture Result No Growth 1 Day    PROTHROMBIN TIME (PT)    Collection Time: 09/11/19  2:59 PM   Result Value Ref Range    PT 14.8 (H) 12.5 - 14.7 seconds    INR 1.11 (H) 0.90 - 1.10   CBC W/ DIFFERENTIAL    Collection Time: 09/11/19  2:59 PM   Res Glucose 172 (H) 70 - 99 mg/dL   AEROBIC BACTERIAL CULTURE    Collection Time: 09/11/19 10:19 PM   Result Value Ref Range    Aerobic Culture Result No Growth 1 Day     Aerobic Smear No WBCs seen     Aerobic Smear No organisms seen    PTT, ACTIVATED    Sondra Reap 7:39 AM   Result Value Ref Range    Retic% 3.3 (H) 0.5 - 2.5 %    Retic Absolute 106.6 22.5 - 147.5 x10(3) uL    Retic IRF 0.075 (L) 0.100 - 0.300 Ratio    Reticulocyte Hemoglobin Equivalent 33.7 28.2 - 36.6 pg   LDH    Collection Time: 09/12/19  7:39 AM transverse and descending thoracic aorta. No evidence for aneurysm. LUNGS:  Mild dependent atelectasis is noted. There is mucous plugging identified in the left lower lobe. There is mild peribronchial thickening noted in both lower lobes.   No consolida patient has a known left IJ thrombus also. 3. There are numerous lymph nodes in the mediastinum. Although non or grossly enlarged by CT criteria, the number with suggest lymphadenopathy.   Reactive lymph nodes and metastatic disease considered in the diff

## 2019-09-13 NOTE — PROGRESS NOTES
[facility]  Nephrology Progress Note    Teresa Barger Patient Status:  Inpatient    1945 MRN [id number]   [facility] 7NE-A Attending Melina Reeves MD   Hosp Day # 0 PCP Yamini Larkin DO       SUBJECTIVE:  No c/o this AM 09/13/19  1252   PGLU 179* 172* 211* 130* 168*       Meds:     Current Facility-Administered Medications:  heparin (PORCINE) drip [id number]/250mL infusion CONTINUOUS 200-3,000 Units/hr Intravenous Continuous   Clopidogrel Bisulfate (PLAVIX) tab 75 mg 75 m

## 2019-09-13 NOTE — DISCHARGE SUMMARY
[facility] [place] [facility]PITAL[facility]  DISCHARGE SUMMARY     Fuentes Barger Patient Status:  Observation    1945 MRN [id number]   [facility] 7NE-A Attending Summer Duran MD   Hosp Day # 1 PCP Shiela Carroll DO     Date of Admission: 2019  Date of Instructions Prescription details   apixaban 5 MG Tabs  Commonly known as:  ELIQUIS      Take 2 tabs (10mg) by mouth twice daily for 7 days, then take 1 tab (5mg) by mouth twice daily thereafter.    Quantity:  74 tablet  Refills:  0        CONTINUE taking t 9/14/2019 - 10/14/2019      Date Arrival Time Visit Type Length Department Provider     9/25/2019 10:30 AM  FOLLOW UP-HEM/ONC [2554] 15 min.  [facility] in [place]    Patient Instructions:      If a lab draw is part

## 2019-09-13 NOTE — PROGRESS NOTES
[facility]  Progress Note     Edith Levar Barger Patient Status:  Observation    1945 MRN [id number]   [facility] 7NE-A Attending Sami Recinos MD   Hosp Day # 0 PCP Laura Mckinney DO     Chief Complaint: L neck swelling    S: LSPIQK Epic.    Medications:   • Clopidogrel Bisulfate  75 mg Oral Daily   • Acidophilus/Pectin  1 capsule Oral Daily   • Insulin Aspart Pen  1-5 Units Subcutaneous TID CC and HS   • Metoprolol Succinate ER  50 mg Oral Nightly       ASSESSMENT / PLAN:     1. L calvillo

## 2019-09-16 ENCOUNTER — PATIENT OUTREACH (OUTPATIENT)
Dept: CASE MANAGEMENT | Age: 74
End: 2019-09-16

## 2019-09-16 DIAGNOSIS — I82.C12 INTERNAL JUGULAR VEIN THROMBOSIS, LEFT (HCC): ICD-10-CM

## 2019-09-16 DIAGNOSIS — Z02.9 ENCOUNTERS FOR UNSPECIFIED ADMINISTRATIVE PURPOSE: ICD-10-CM

## 2019-09-16 PROCEDURE — 1111F DSCHRG MED/CURRENT MED MERGE: CPT

## 2019-09-16 NOTE — PROGRESS NOTES
Initial Post Discharge Follow Up   Discharge Date: 9/13/19  Contact Date: 9/16/2019    Consent Verification:  Assessment Completed With: Patient  HIPAA Verified? Yes    Discharge Dx:     Internal jugular vein thrombosis, left    Was TCC ordered: no    G Rfl: 1   Metoprolol Succinate ER 50 MG Oral Tablet 24 Hr Take 1 tablet (50 mg total) by mouth daily. Disp: 90 tablet Rfl: 1   acetaminophen 500 MG Oral Tab Take 500 mg by mouth every 6 (six) hours as needed for Pain.  Disp:  Rfl:    Clopidogrel Bisulfate [address])    If a lab draw is part of your appointment, please arrive 15 minutes early.           [address] [facility] [address] [address] [phone]

## 2019-09-17 ENCOUNTER — TELEPHONE (OUTPATIENT)
Dept: HEMATOLOGY/ONCOLOGY | Facility: HOSPITAL | Age: 74
End: 2019-09-17

## 2019-09-18 ENCOUNTER — TELEPHONE (OUTPATIENT)
Dept: HEMATOLOGY/ONCOLOGY | Facility: HOSPITAL | Age: 74
End: 2019-09-18

## 2019-09-18 NOTE — TELEPHONE ENCOUNTER
CT reviewed in lung conference, LN are not concerning, will review in detail when see Dr Sahara Best

## 2019-09-20 ENCOUNTER — OFFICE VISIT (OUTPATIENT)
Dept: FAMILY MEDICINE CLINIC | Facility: CLINIC | Age: 74
End: 2019-09-20
Payer: MEDICARE

## 2019-09-20 VITALS
HEIGHT: 61.5 IN | TEMPERATURE: 99 F | RESPIRATION RATE: 18 BRPM | BODY MASS INDEX: 25.35 KG/M2 | OXYGEN SATURATION: 96 % | SYSTOLIC BLOOD PRESSURE: 146 MMHG | HEART RATE: 60 BPM | WEIGHT: 136 LBS | DIASTOLIC BLOOD PRESSURE: 74 MMHG

## 2019-09-20 DIAGNOSIS — I82.622 ACUTE DEEP VEIN THROMBOSIS (DVT) OF OTHER VEIN OF LEFT UPPER EXTREMITY (HCC): Primary | ICD-10-CM

## 2019-09-20 DIAGNOSIS — I25.810 CORONARY ARTERY DISEASE INVOLVING CORONARY BYPASS GRAFT OF NATIVE HEART WITHOUT ANGINA PECTORIS: ICD-10-CM

## 2019-09-20 DIAGNOSIS — E11.9 TYPE 2 DIABETES MELLITUS WITHOUT COMPLICATION, WITHOUT LONG-TERM CURRENT USE OF INSULIN (HCC): ICD-10-CM

## 2019-09-20 PROCEDURE — 1111F DSCHRG MED/CURRENT MED MERGE: CPT | Performed by: FAMILY MEDICINE

## 2019-09-20 PROCEDURE — 99496 TRANSJ CARE MGMT HIGH F2F 7D: CPT | Performed by: FAMILY MEDICINE

## 2019-09-20 NOTE — PROGRESS NOTES
HPI:    Caterina Nation is a 75year old female here today for hospital follow up.    She was discharged from Inpatient hospital, [facility] to Home   Admission Date: 9/11/19   Discharge Date: 9/13/19  Hospital Discharge Diagnoses (since 8/21/2019) days, then take 1 tab (5mg) by mouth twice daily thereafter. Coenzyme Q10 (CO Q 10 OR) Take by mouth. bumetanide 1 MG Oral Tab Take 1 tablet (1 mg total) by mouth BID (Diuretic). (Patient taking differently: Take 1 mg by mouth BID (Diuretic).   )   Segun angioplasty; cabg (02/20/2018); bypass surgery; reduction left (Left, 1998); reduction right (Right, 1998); other surgical history (abdominoplasty '98); other surgical history (breast reduction '98); other surgical history (right fem pop bypass 2/10/15); o tenderness  LUNGS: clear to auscultation  CARDIO: RRR without murmur  GI: good BS's, no masses, HSM or tenderness  MUSCULOSKELETAL: back is not tender, FROM of the extremities  EXTREMITIES: no cyanosis, clubbing or edema  NEURO: Oriented times three, crani

## 2019-09-23 ENCOUNTER — TELEPHONE (OUTPATIENT)
Dept: HEMATOLOGY/ONCOLOGY | Facility: HOSPITAL | Age: 74
End: 2019-09-23

## 2019-09-23 NOTE — TELEPHONE ENCOUNTER
Markell William  calling to inform Dr Akanksha Guzman that  Danna Alvarado is hospitalized with a broken hip and needed to cancel appt 9/25 and will reschedule after she gets out of rehab. michelle

## 2019-09-25 ENCOUNTER — APPOINTMENT (OUTPATIENT)
Dept: HEMATOLOGY/ONCOLOGY | Facility: HOSPITAL | Age: 74
End: 2019-09-25
Attending: INTERNAL MEDICINE
Payer: MEDICARE

## 2019-09-25 ENCOUNTER — TELEPHONE (OUTPATIENT)
Dept: FAMILY MEDICINE CLINIC | Facility: CLINIC | Age: 74
End: 2019-09-25

## 2019-09-25 NOTE — TELEPHONE ENCOUNTER
Pt is supposed to have her A1C drawn  She fell and broke her hip  She is in [address]   She wants to know if she should have them draw it there?   Please advise

## 2019-09-27 ENCOUNTER — APPOINTMENT (OUTPATIENT)
Dept: CARDIOLOGY | Age: 74
End: 2019-09-27

## 2019-09-30 ENCOUNTER — TELEPHONE (OUTPATIENT)
Dept: HEMATOLOGY/ONCOLOGY | Facility: HOSPITAL | Age: 74
End: 2019-09-30

## 2019-09-30 ENCOUNTER — TELEPHONE (OUTPATIENT)
Dept: FAMILY MEDICINE CLINIC | Facility: CLINIC | Age: 74
End: 2019-09-30

## 2019-09-30 NOTE — TELEPHONE ENCOUNTER
Medical Record received from [facility], record given to Dr. Sandip Madera for review.

## 2019-10-03 ENCOUNTER — APPOINTMENT (OUTPATIENT)
Dept: CARDIOLOGY | Age: 74
End: 2019-10-03
Attending: INTERNAL MEDICINE

## 2019-10-07 ENCOUNTER — TELEPHONE (OUTPATIENT)
Dept: FAMILY MEDICINE CLINIC | Facility: CLINIC | Age: 74
End: 2019-10-07

## 2019-10-07 NOTE — TELEPHONE ENCOUNTER
Med Record received from [address] [place] [address], record given to Dr. Zahraa Pate to review.

## 2019-10-11 DIAGNOSIS — I10 BENIGN ESSENTIAL HTN: ICD-10-CM

## 2019-10-14 RX ORDER — METOPROLOL SUCCINATE 50 MG/1
TABLET, EXTENDED RELEASE ORAL
Qty: 90 TABLET | Refills: 0 | Status: SHIPPED | OUTPATIENT
Start: 2019-10-14 | End: 2020-01-06

## 2019-10-18 ENCOUNTER — HOSPITAL ENCOUNTER (EMERGENCY)
Facility: HOSPITAL | Age: 74
Discharge: HOME | End: 2019-10-18
Payer: MEDICARE

## 2019-10-18 VITALS
BODY MASS INDEX: 25 KG/M2 | OXYGEN SATURATION: 98 % | TEMPERATURE: 98 F | WEIGHT: 136 LBS | RESPIRATION RATE: 16 BRPM | SYSTOLIC BLOOD PRESSURE: 176 MMHG | HEART RATE: 60 BPM | DIASTOLIC BLOOD PRESSURE: 51 MMHG

## 2019-10-18 DIAGNOSIS — R33.9 URINARY RETENTION: ICD-10-CM

## 2019-10-18 DIAGNOSIS — T83.011A MALFUNCTION OF FOLEY CATHETER, INITIAL ENCOUNTER (HCC): Primary | ICD-10-CM

## 2019-10-18 PROCEDURE — 87086 URINE CULTURE/COLONY COUNT: CPT | Performed by: PHYSICIAN ASSISTANT

## 2019-10-18 PROCEDURE — 87077 CULTURE AEROBIC IDENTIFY: CPT | Performed by: PHYSICIAN ASSISTANT

## 2019-10-18 PROCEDURE — 99283 EMERGENCY DEPT VISIT LOW MDM: CPT

## 2019-10-18 PROCEDURE — 87186 SC STD MICRODIL/AGAR DIL: CPT | Performed by: PHYSICIAN ASSISTANT

## 2019-10-18 PROCEDURE — 81001 URINALYSIS AUTO W/SCOPE: CPT | Performed by: PHYSICIAN ASSISTANT

## 2019-10-19 NOTE — ED PROVIDER NOTES
Patient Seen in: [facility] Emergency Department      History   Patient presents with:  Cath Tube Problem (gastrointestinal, urinary, integumentary)    Stated Complaint: downs blocked    HPI    CHIEF COMPLAINT: Urinary retention, blocked Downs    HI of liver     enzymes were elevated recently   • Elevated cholesterol    • Elevated hemoglobin A1c    • Fibromyalgia    • Heart attack ([facility] [address] 03/2008    stents x2 at that time.     • Heart disease    • High blood pressure    • High cholesterol    • History of by Justice Rubinstein, MD at [facility]   • TONSILLECTOMY                      Social History    Tobacco Use      Smoking status: Former Smoker        Packs/day: 2.50        Years: 9.00        Pack years: 22.5        Quit date: 5/23/1972        Y following components:       Result Value    Clarity Urine Hazy (*)     Glucose Urine 50  (*)     Protein Urine 100  (*)     Leukocyte Esterase Urine Large (*)     WBC Urine >50 (*)     Bacteria Urine Rare (*)     Hyaline Casts Present (*)     Mucous Urine they had no questions, complaints, or concerns. Patient states they understand diagnosis, followup plan and agree with and understand  discharge instructions and plan. I answered all of the patient's questions prior to discharge.   Patient felt comfortable

## 2019-10-21 ENCOUNTER — LAB ENCOUNTER (OUTPATIENT)
Dept: LAB | Age: 74
End: 2019-10-21
Attending: FAMILY MEDICINE
Payer: MEDICARE

## 2019-10-21 ENCOUNTER — OFFICE VISIT (OUTPATIENT)
Dept: FAMILY MEDICINE CLINIC | Facility: CLINIC | Age: 74
End: 2019-10-21
Payer: MEDICARE

## 2019-10-21 VITALS
RESPIRATION RATE: 18 BRPM | TEMPERATURE: 98 F | HEIGHT: 61.5 IN | OXYGEN SATURATION: 98 % | BODY MASS INDEX: 25.35 KG/M2 | HEART RATE: 63 BPM | DIASTOLIC BLOOD PRESSURE: 50 MMHG | SYSTOLIC BLOOD PRESSURE: 146 MMHG | WEIGHT: 136 LBS

## 2019-10-21 DIAGNOSIS — I25.810 CORONARY ARTERY DISEASE INVOLVING CORONARY BYPASS GRAFT OF NATIVE HEART WITHOUT ANGINA PECTORIS: ICD-10-CM

## 2019-10-21 DIAGNOSIS — E08.42 DIABETIC POLYNEUROPATHY ASSOCIATED WITH DIABETES MELLITUS DUE TO UNDERLYING CONDITION (HCC): ICD-10-CM

## 2019-10-21 DIAGNOSIS — I73.9 PERIPHERAL VASCULAR DISEASE OF LOWER EXTREMITY (HCC): ICD-10-CM

## 2019-10-21 DIAGNOSIS — J44.9 CHRONIC OBSTRUCTIVE PULMONARY DISEASE, UNSPECIFIED COPD TYPE (HCC): ICD-10-CM

## 2019-10-21 DIAGNOSIS — E08.51 DIABETES MELLITUS DUE TO UNDERLYING CONDITION WITH DIABETIC PERIPHERAL ANGIOPATHY WITHOUT GANGRENE, WITH LONG-TERM CURRENT USE OF INSULIN (HCC): ICD-10-CM

## 2019-10-21 DIAGNOSIS — N18.30 CHRONIC RENAL DISEASE, STAGE 3, MODERATELY DECREASED GLOMERULAR FILTRATION RATE (GFR) BETWEEN 30-59 ML/MIN/1.73 SQUARE METER (HCC): ICD-10-CM

## 2019-10-21 DIAGNOSIS — E11.8 TYPE 2 DIABETES MELLITUS WITH COMPLICATION, WITH LONG-TERM CURRENT USE OF INSULIN (HCC): ICD-10-CM

## 2019-10-21 DIAGNOSIS — I10 BENIGN ESSENTIAL HTN: ICD-10-CM

## 2019-10-21 DIAGNOSIS — D72.829 LEUKOCYTOSIS, UNSPECIFIED TYPE: ICD-10-CM

## 2019-10-21 DIAGNOSIS — S72.002D CLOSED FRACTURE OF LEFT HIP WITH ROUTINE HEALING, SUBSEQUENT ENCOUNTER: ICD-10-CM

## 2019-10-21 DIAGNOSIS — R82.90 CLOUDY URINE: ICD-10-CM

## 2019-10-21 DIAGNOSIS — E11.9 TYPE 2 DIABETES MELLITUS WITHOUT COMPLICATION, WITHOUT LONG-TERM CURRENT USE OF INSULIN (HCC): Primary | ICD-10-CM

## 2019-10-21 DIAGNOSIS — Z79.4 DIABETES MELLITUS DUE TO UNDERLYING CONDITION WITH DIABETIC PERIPHERAL ANGIOPATHY WITHOUT GANGRENE, WITH LONG-TERM CURRENT USE OF INSULIN (HCC): ICD-10-CM

## 2019-10-21 DIAGNOSIS — N18.30 CKD (CHRONIC KIDNEY DISEASE) STAGE 3, GFR 30-59 ML/MIN (HCC): ICD-10-CM

## 2019-10-21 DIAGNOSIS — Z79.4 TYPE 2 DIABETES MELLITUS WITH COMPLICATION, WITH LONG-TERM CURRENT USE OF INSULIN (HCC): ICD-10-CM

## 2019-10-21 PROCEDURE — 83036 HEMOGLOBIN GLYCOSYLATED A1C: CPT

## 2019-10-21 PROCEDURE — 80061 LIPID PANEL: CPT

## 2019-10-21 PROCEDURE — 99214 OFFICE O/P EST MOD 30 MIN: CPT | Performed by: FAMILY MEDICINE

## 2019-10-21 PROCEDURE — 80053 COMPREHEN METABOLIC PANEL: CPT

## 2019-10-21 PROCEDURE — 85025 COMPLETE CBC W/AUTO DIFF WBC: CPT

## 2019-10-21 PROCEDURE — 36415 COLL VENOUS BLD VENIPUNCTURE: CPT

## 2019-10-21 RX ORDER — SULFAMETHOXAZOLE AND TRIMETHOPRIM 800; 160 MG/1; MG/1
1 TABLET ORAL 2 TIMES DAILY
Qty: 14 TABLET | Refills: 0 | Status: SHIPPED | OUTPATIENT
Start: 2019-10-21 | End: 2019-10-28

## 2019-10-21 RX ORDER — METOPROLOL SUCCINATE 25 MG/1
25 TABLET, EXTENDED RELEASE ORAL DAILY
Qty: 90 TABLET | Refills: 0 | Status: SHIPPED | OUTPATIENT
Start: 2019-10-21 | End: 2020-02-06

## 2019-10-21 NOTE — PROGRESS NOTES
HPI:    Yogi Bateman is a 75year old female here today for hospital follow up.    She was discharged from Inpatient hospital, [facility] to [address] [facility] Discharge Diagnoses (since 9/21/2019)   None        During the visit, the following wa (CO Q 10 OR), Take by mouth. bumetanide 1 MG Oral Tab, Take 1 tablet (1 mg total) by mouth BID (Diuretic). (Patient taking differently: Take 1 mg by mouth BID (Diuretic).   )  acetaminophen 500 MG Oral Tab, Take 500 mg by mouth every 6 (six) hours as neede history (breast reduction '98); other surgical history (right fem pop bypass 2/10/15); other surgical history (07/16/2019); and pacemaker monitor (06/22/2019).     She family history includes Diabetes in her brother and mother; Heart Disorder in her brother extremities  EXTREMITIES: no cyanosis, clubbing or edema, left hip incision healing well   NEURO: Oriented times three, cranial nerves are intact, motor and sensory are grossly intact    ASSESSMENT/ PLAN:   Diagnoses and all orders for this visit:    Type

## 2019-10-25 ENCOUNTER — LAB ENCOUNTER (OUTPATIENT)
Dept: LAB | Age: 74
End: 2019-10-25
Attending: FAMILY MEDICINE
Payer: MEDICARE

## 2019-10-25 DIAGNOSIS — D72.829 LEUKOCYTOSIS, UNSPECIFIED TYPE: Primary | ICD-10-CM

## 2019-10-25 DIAGNOSIS — D72.829 LEUKOCYTOSIS, UNSPECIFIED TYPE: ICD-10-CM

## 2019-10-25 PROCEDURE — 85025 COMPLETE CBC W/AUTO DIFF WBC: CPT

## 2019-10-25 PROCEDURE — 36415 COLL VENOUS BLD VENIPUNCTURE: CPT

## 2019-10-26 ENCOUNTER — HOSPITAL ENCOUNTER (EMERGENCY)
Facility: HOSPITAL | Age: 74
Discharge: HOME | End: 2019-10-26
Attending: EMERGENCY MEDICINE
Payer: MEDICARE

## 2019-10-26 VITALS
DIASTOLIC BLOOD PRESSURE: 51 MMHG | RESPIRATION RATE: 17 BRPM | TEMPERATURE: 98 F | OXYGEN SATURATION: 99 % | SYSTOLIC BLOOD PRESSURE: 147 MMHG | HEART RATE: 60 BPM

## 2019-10-26 DIAGNOSIS — R33.9 URINARY RETENTION: Primary | ICD-10-CM

## 2019-10-26 DIAGNOSIS — T83.9XXA PROBLEM WITH FOLEY CATHETER, INITIAL ENCOUNTER (HCC): ICD-10-CM

## 2019-10-26 PROCEDURE — 99282 EMERGENCY DEPT VISIT SF MDM: CPT

## 2019-10-26 PROCEDURE — 51702 INSERT TEMP BLADDER CATH: CPT

## 2019-10-26 PROCEDURE — 87086 URINE CULTURE/COLONY COUNT: CPT | Performed by: EMERGENCY MEDICINE

## 2019-10-26 PROCEDURE — 81001 URINALYSIS AUTO W/SCOPE: CPT | Performed by: EMERGENCY MEDICINE

## 2019-10-26 PROCEDURE — 99283 EMERGENCY DEPT VISIT LOW MDM: CPT

## 2019-10-26 NOTE — ED INITIAL ASSESSMENT (HPI)
Pt to ED from home with c/o retention, reports noting decreased drainage and leaking around downs catheter beginning 1500 today. Pt states her downs was blocked with sediment and changed 1 week ago.  Pt requesting downs be removed and bladder scanned to see

## 2019-10-26 NOTE — ED NOTES
Schwab catheter containing 200mL, removed without difficulty. Pt voided 600mL.  Bladder scanner completed, showing 180mL

## 2019-10-27 NOTE — ED NOTES
Report received from CAM Rangel. Patient care assumed at this time. ED Provider - Dr. Nata Arthur at bedside for re-evaluation and update.

## 2019-10-27 NOTE — ED PROVIDER NOTES
Patient Seen in: [facility] Emergency Department      History   Patient presents with:  Cath Tube Problem (gastrointestinal, urinary, integumentary)    Stated Complaint: need catheter flushed    HPI    Patient is a 41-year-old female who presents em CPAP   • Unspecified essential hypertension    • Visual impairment     scar on left retina; glasses              Past Surgical History:   Procedure Laterality Date   • ABLATION      venous   • ANGIOGRAM     • ANGIOPLASTY (CORONARY)  5/2014    RCA, madelyn complaint: need catheter flushed  Other systems are as noted in HPI. Constitutional and vital signs reviewed. All other systems reviewed and negative except as noted above.     Physical Exam     ED Triage Vitals [10/26/19 1810]   BP (!) 188/64   Pulse Bacteria Urine 1+ (*)     Yeast Urine Present (*)     All other components within normal limits   URINE CULTURE, ROUTINE                MDM     Schwab catheter removed here in the emergency room.   Patient was able to urinate about 600 cc and a bladder sc

## 2019-10-28 DIAGNOSIS — D72.829 LEUKOCYTOSIS, UNSPECIFIED TYPE: Primary | ICD-10-CM

## 2019-10-30 ENCOUNTER — OFFICE VISIT (OUTPATIENT)
Dept: HEMATOLOGY/ONCOLOGY | Facility: HOSPITAL | Age: 74
End: 2019-10-30
Attending: INTERNAL MEDICINE
Payer: MEDICARE

## 2019-10-30 VITALS
WEIGHT: 135 LBS | SYSTOLIC BLOOD PRESSURE: 171 MMHG | BODY MASS INDEX: 25.16 KG/M2 | HEIGHT: 61.5 IN | OXYGEN SATURATION: 98 % | HEART RATE: 63 BPM | RESPIRATION RATE: 16 BRPM | DIASTOLIC BLOOD PRESSURE: 61 MMHG | TEMPERATURE: 96 F

## 2019-10-30 DIAGNOSIS — D64.9 NORMOCYTIC ANEMIA: ICD-10-CM

## 2019-10-30 DIAGNOSIS — I82.C12 INTERNAL JUGULAR VEIN THROMBOSIS, LEFT (HCC): Primary | ICD-10-CM

## 2019-10-30 PROCEDURE — 99214 OFFICE O/P EST MOD 30 MIN: CPT | Performed by: INTERNAL MEDICINE

## 2019-10-30 NOTE — PROGRESS NOTES
Cancer Center Progress Note    Problem List:      Patient Active Problem List:     Fibromyalgia     Peripheral vascular disease of lower extremity ([facility] [address]     H/O peripheral artery bypass     At risk for falling     Claudication [facility])     Coronary artery dis indwelling urethral catheter ([facility] [address]     Nephrotic syndrome     Diabetic nephropathy associated with type 2 diabetes mellitus ([address]     Mediastinal lymphadenopathy      Interim History:     Sherine Meadows presents today for evaluation and management of a diagno right leg   • DIABETES    • Diabetes [facility])    • Disorder of liver     enzymes were elevated recently   • Elevated cholesterol    • Elevated hemoglobin A1c    • Fibromyalgia    • Heart attack ([facility] [address] 03/2008    stents x2 at that time.     • Heart disease    • INTRAOCULAR LENS IMPLANT [id number] Left 6/23/2010    Performed by Yair Beach MD at [address] [place] [address]   • TONSILLECTOMY         Family History Reviewed:  Family History   Problem Relation Age of Onset   • Heart Disorder Father    • Diabetes Mother (5mg) by mouth twice daily thereafter., Disp: 74 tablet, Rfl: 0  bumetanide 1 MG Oral Tab, Take 1 tablet (1 mg total) by mouth BID (Diuretic). (Patient taking differently: Take 1 mg by mouth BID (Diuretic).   ), Disp: 180 tablet, Rfl: 1  acetaminophen 500 M 10/25/2019    RDW 14.8 10/25/2019    .0 10/25/2019     Lab Results   Component Value Date     (L) 10/21/2019    K 4.1 10/21/2019    CL 98 10/21/2019    CO2 26.0 10/21/2019    BUN 48 (H) 10/21/2019    CREATSERUM 1.70 (H) 10/21/2019     ( subclavian vein consistent with known thrombus. The left internal jugular vein is not well seen because of the artifact from the pacemaker battery. There is known DVT in the left IJ also. LIMITED ABDOMEN: Status post cholecystectomy.  There is splenomegaly discussed this and the data concerning the risks of bleeding. She was no comfortable proceeding with the anticoagulation. Recent orthopedic surgery:    She also was on the anticoagulation for DVT prophylaxis. She will continue this for another month.  I

## 2019-10-31 DIAGNOSIS — L03.116 LEFT LEG CELLULITIS: ICD-10-CM

## 2019-10-31 DIAGNOSIS — J02.9 SORE THROAT: ICD-10-CM

## 2019-10-31 DIAGNOSIS — L97.921 SKIN ULCER OF LEFT LOWER LEG, LIMITED TO BREAKDOWN OF SKIN (HCC): ICD-10-CM

## 2019-10-31 RX ORDER — HYDROMORPHONE HYDROCHLORIDE 2 MG/1
2 TABLET ORAL EVERY 4 HOURS PRN
Qty: 20 TABLET | Refills: 0 | Status: ON HOLD | OUTPATIENT
Start: 2019-10-31 | End: 2021-01-01

## 2019-10-31 NOTE — TELEPHONE ENCOUNTER
Approve/deny? Last filled 11/29/2017 #20, pt states this has lasted her 2 years, because she only uses when her fibromyalgia pain is severe. She is now requesting refill.

## 2019-11-03 ENCOUNTER — TELEPHONE (OUTPATIENT)
Dept: SURGERY | Facility: HOSPITAL | Age: 74
End: 2019-11-03

## 2019-11-03 RX ORDER — TOLTERODINE 4 MG/1
4 CAPSULE, EXTENDED RELEASE ORAL
Qty: 30 CAPSULE | Refills: 0 | Status: SHIPPED | OUTPATIENT
Start: 2019-11-03 | End: 2019-11-03 | Stop reason: CLARIF

## 2019-11-03 RX ORDER — TOLTERODINE 4 MG/1
4 CAPSULE, EXTENDED RELEASE ORAL
Qty: 20 CAPSULE | Refills: 0 | Status: SHIPPED | OUTPATIENT
Start: 2019-11-03 | End: 2019-12-04

## 2019-11-03 NOTE — TELEPHONE ENCOUNTER
Dr Pieter Holly pt. Urinary retention. Currently managed with Schwab. Complaining of bladder spasms. Urine culture recently was negative and a new one is pending. Currently on antibiotics. I recommended tolterodine.     It sounds like she is having recurrent

## 2019-11-13 ENCOUNTER — TELEPHONE (OUTPATIENT)
Dept: HEMATOLOGY/ONCOLOGY | Facility: HOSPITAL | Age: 74
End: 2019-11-13

## 2019-11-13 NOTE — TELEPHONE ENCOUNTER
Patient says Dr. Philip Silva wants her to keep taking  Eliquis 5 mlg tablets don't see in medication list. Please send refill to pharmacy . Only had a few pills left

## 2019-11-18 RX ORDER — BUMETANIDE 1 MG/1
TABLET ORAL
Qty: 180 TABLET | Refills: 1 | Status: SHIPPED | OUTPATIENT
Start: 2019-11-18 | End: 2020-01-01 | Stop reason: DRUGHIGH

## 2019-12-04 ENCOUNTER — OFFICE VISIT (OUTPATIENT)
Dept: HEMATOLOGY/ONCOLOGY | Facility: HOSPITAL | Age: 74
End: 2019-12-04
Attending: INTERNAL MEDICINE
Payer: MEDICARE

## 2019-12-04 VITALS
HEIGHT: 61.5 IN | HEART RATE: 60 BPM | OXYGEN SATURATION: 97 % | DIASTOLIC BLOOD PRESSURE: 62 MMHG | WEIGHT: 130 LBS | TEMPERATURE: 98 F | BODY MASS INDEX: 24.23 KG/M2 | RESPIRATION RATE: 16 BRPM | SYSTOLIC BLOOD PRESSURE: 173 MMHG

## 2019-12-04 DIAGNOSIS — Z91.81 AT RISK FOR FALLING: ICD-10-CM

## 2019-12-04 DIAGNOSIS — D64.9 NORMOCYTIC ANEMIA: Primary | ICD-10-CM

## 2019-12-04 DIAGNOSIS — I82.C12 INTERNAL JUGULAR VEIN THROMBOSIS, LEFT (HCC): ICD-10-CM

## 2019-12-04 PROCEDURE — 99214 OFFICE O/P EST MOD 30 MIN: CPT | Performed by: INTERNAL MEDICINE

## 2019-12-04 NOTE — PROGRESS NOTES
Cancer Center Progress Note    Problem List:      Patient Active Problem List:     Fibromyalgia     Peripheral vascular disease of lower extremity ([facility] [address]     H/O peripheral artery bypass     At risk for falling     Claudication [facility])     Coronary artery dis indwelling urethral catheter ([facility] [address]     Nephrotic syndrome     Diabetic nephropathy associated with type 2 diabetes mellitus ([address]     Mediastinal lymphadenopathy      Interim History:     Floyd Tavarez presents today for evaluation and management of a diagno at that time. • Heart disease    • High blood pressure    • High cholesterol    • History of blood transfusion     x2 transfusions last one about 2015 or so.    • HTN (hypertension) 7/11/2013   • Intermittent claudication (HCC)    • Neuropathy    • Osteo Father    • Diabetes Mother    • Diabetes Brother    • Heart Disorder Brother        Allergies:       Statins                 OTHER (SEE COMMENTS)    Comment:Feet turn black, muscle weakness in the legs             CLASS, feet turned black             Feet tablet, Rfl: 0  acetaminophen 500 MG Oral Tab, Take 500 mg by mouth every 6 (six) hours as needed for Pain., Disp: , Rfl:   Clopidogrel Bisulfate 75 MG Oral Tab, Take 1 tablet (75 mg total) by mouth daily. , Disp: 30 tablet, Rfl: 11  Specialty Vitamins Prod 1.70 (H) 10/21/2019     (H) 10/21/2019    CA 9.2 10/21/2019    ALKPHO 159 (H) 10/21/2019    ALT 13 10/21/2019    AST 18 10/21/2019    BILT 0.7 10/21/2019    ALB 3.4 10/21/2019    TP 6.8 10/21/2019       Radiologic imaging reviewed at this visit: cholecystectomy. There is splenomegaly. Spleen measures 14.2 by 4.8 by 9.6 cm. Atheromatous changes are seen in the aorta and aortic branches in the upper abdomen. No adrenal mass. BONES:  Status post sternotomy.  No lytic or blastic bony lesions are id month.     Normocytic anemia.     Repeat CBC today.       Zac Munroe MD

## 2019-12-04 NOTE — PROGRESS NOTES
MD f/u for IJ thrombosis. Continues on eliquis 5 mg BID.      Education Record    Learner:  Patient    Barriers / Limitations:  None   Comments:    Method:  Discussion   Comments:    General Topics:  Plan of care reviewed   Comments:    Outcome:  Shows unde

## 2019-12-09 ENCOUNTER — TELEPHONE (OUTPATIENT)
Dept: HEMATOLOGY/ONCOLOGY | Facility: HOSPITAL | Age: 74
End: 2019-12-09

## 2019-12-09 NOTE — TELEPHONE ENCOUNTER
Nicolás Amaya called to say that the results of the blood test show that she is slightly anemic and she is wondering what needs to be done now. Is there a supplement that she can take, does she needs to change her diet. She asked that she please get a call back.

## 2019-12-12 ENCOUNTER — HOSPITAL ENCOUNTER (OUTPATIENT)
Dept: ULTRASOUND IMAGING | Facility: HOSPITAL | Age: 74
Discharge: HOME | End: 2019-12-12
Attending: INTERNAL MEDICINE
Payer: MEDICARE

## 2019-12-12 DIAGNOSIS — I82.C12 INTERNAL JUGULAR VEIN THROMBOSIS, LEFT (HCC): ICD-10-CM

## 2019-12-12 DIAGNOSIS — D64.9 NORMOCYTIC ANEMIA: ICD-10-CM

## 2019-12-12 PROCEDURE — 93971 EXTREMITY STUDY: CPT | Performed by: INTERNAL MEDICINE

## 2020-01-01 ENCOUNTER — LAB ENCOUNTER (OUTPATIENT)
Dept: LAB | Age: 75
End: 2020-01-01
Attending: FAMILY MEDICINE
Payer: MEDICARE

## 2020-01-01 ENCOUNTER — APPOINTMENT (OUTPATIENT)
Dept: CARDIOLOGY | Age: 75
End: 2020-01-01
Attending: INTERNAL MEDICINE

## 2020-01-01 ENCOUNTER — NURSE ONLY (OUTPATIENT)
Dept: HEMATOLOGY/ONCOLOGY | Facility: HOSPITAL | Age: 75
End: 2020-01-01
Attending: INTERNAL MEDICINE
Payer: MEDICARE

## 2020-01-01 ENCOUNTER — APPOINTMENT (OUTPATIENT)
Dept: GENERAL RADIOLOGY | Facility: HOSPITAL | Age: 75
DRG: 064 | End: 2020-01-01
Attending: EMERGENCY MEDICINE
Payer: MEDICARE

## 2020-01-01 ENCOUNTER — ADVANCED DIRECTIVES (OUTPATIENT)
Dept: CARDIOLOGY | Age: 75
End: 2020-01-01

## 2020-01-01 ENCOUNTER — HOSPITAL ENCOUNTER (INPATIENT)
Facility: HOSPITAL | Age: 75
LOS: 6 days | Discharge: HOME | DRG: 064 | End: 2020-01-01
Attending: EMERGENCY MEDICINE | Admitting: HOSPITALIST
Payer: MEDICARE

## 2020-01-01 ENCOUNTER — OFFICE VISIT (OUTPATIENT)
Dept: NEUROLOGY | Facility: CLINIC | Age: 75
End: 2020-01-01
Payer: MEDICARE

## 2020-01-01 ENCOUNTER — EXTERNAL RECORD (OUTPATIENT)
Dept: HEALTH INFORMATION MANAGEMENT | Facility: OTHER | Age: 75
End: 2020-01-01

## 2020-01-01 ENCOUNTER — PATIENT OUTREACH (OUTPATIENT)
Dept: CASE MANAGEMENT | Age: 75
End: 2020-01-01

## 2020-01-01 ENCOUNTER — APPOINTMENT (OUTPATIENT)
Dept: MRI IMAGING | Facility: HOSPITAL | Age: 75
DRG: 064 | End: 2020-01-01
Attending: PHYSICIAN ASSISTANT
Payer: MEDICARE

## 2020-01-01 ENCOUNTER — TELEPHONE (OUTPATIENT)
Dept: CARDIOLOGY | Age: 75
End: 2020-01-01

## 2020-01-01 ENCOUNTER — APPOINTMENT (OUTPATIENT)
Dept: CT IMAGING | Facility: HOSPITAL | Age: 75
DRG: 064 | End: 2020-01-01
Attending: EMERGENCY MEDICINE
Payer: MEDICARE

## 2020-01-01 ENCOUNTER — OFFICE VISIT (OUTPATIENT)
Dept: CARDIOLOGY | Age: 75
End: 2020-01-01

## 2020-01-01 ENCOUNTER — OFFICE VISIT (OUTPATIENT)
Dept: FAMILY MEDICINE CLINIC | Facility: CLINIC | Age: 75
End: 2020-01-01
Payer: MEDICARE

## 2020-01-01 ENCOUNTER — TELEPHONE (OUTPATIENT)
Dept: FAMILY MEDICINE CLINIC | Facility: CLINIC | Age: 75
End: 2020-01-01

## 2020-01-01 ENCOUNTER — ANCILLARY PROCEDURE (OUTPATIENT)
Dept: CARDIOLOGY | Age: 75
End: 2020-01-01
Attending: INTERNAL MEDICINE

## 2020-01-01 ENCOUNTER — DOCUMENTATION (OUTPATIENT)
Dept: CARDIOLOGY | Age: 75
End: 2020-01-01

## 2020-01-01 ENCOUNTER — APPOINTMENT (OUTPATIENT)
Dept: CV DIAGNOSTICS | Facility: HOSPITAL | Age: 75
DRG: 064 | End: 2020-01-01
Attending: HOSPITALIST
Payer: MEDICARE

## 2020-01-01 ENCOUNTER — ANCILLARY ORDERS (OUTPATIENT)
Dept: CARDIOLOGY | Age: 75
End: 2020-01-01

## 2020-01-01 ENCOUNTER — TELEPHONE (OUTPATIENT)
Dept: NEUROLOGY | Facility: CLINIC | Age: 75
End: 2020-01-01

## 2020-01-01 ENCOUNTER — OFFICE VISIT (OUTPATIENT)
Dept: NEPHROLOGY | Facility: CLINIC | Age: 75
End: 2020-01-01
Payer: MEDICARE

## 2020-01-01 ENCOUNTER — APPOINTMENT (OUTPATIENT)
Dept: GENERAL RADIOLOGY | Facility: HOSPITAL | Age: 75
DRG: 064 | End: 2020-01-01
Attending: INTERNAL MEDICINE
Payer: MEDICARE

## 2020-01-01 ENCOUNTER — TELEPHONE (OUTPATIENT)
Dept: HEMATOLOGY/ONCOLOGY | Facility: HOSPITAL | Age: 75
End: 2020-01-01

## 2020-01-01 ENCOUNTER — HOSPITAL ENCOUNTER (OUTPATIENT)
Dept: CV DIAGNOSTICS | Facility: HOSPITAL | Age: 75
Discharge: HOME | End: 2020-01-01
Attending: INTERNAL MEDICINE
Payer: MEDICARE

## 2020-01-01 ENCOUNTER — TELEPHONE (OUTPATIENT)
Dept: NEPHROLOGY | Facility: CLINIC | Age: 75
End: 2020-01-01

## 2020-01-01 ENCOUNTER — APPOINTMENT (OUTPATIENT)
Dept: ULTRASOUND IMAGING | Facility: HOSPITAL | Age: 75
DRG: 064 | End: 2020-01-01
Attending: HOSPITALIST
Payer: MEDICARE

## 2020-01-01 VITALS
HEIGHT: 61.5 IN | WEIGHT: 132 LBS | OXYGEN SATURATION: 96 % | RESPIRATION RATE: 16 BRPM | SYSTOLIC BLOOD PRESSURE: 180 MMHG | BODY MASS INDEX: 24.6 KG/M2 | TEMPERATURE: 99 F | DIASTOLIC BLOOD PRESSURE: 62 MMHG | HEART RATE: 60 BPM

## 2020-01-01 VITALS
DIASTOLIC BLOOD PRESSURE: 64 MMHG | BODY MASS INDEX: 23.42 KG/M2 | SYSTOLIC BLOOD PRESSURE: 182 MMHG | WEIGHT: 126 LBS | HEART RATE: 60 BPM

## 2020-01-01 VITALS
SYSTOLIC BLOOD PRESSURE: 132 MMHG | HEART RATE: 70 BPM | RESPIRATION RATE: 16 BRPM | DIASTOLIC BLOOD PRESSURE: 72 MMHG | WEIGHT: 140 LBS | BODY MASS INDEX: 26 KG/M2

## 2020-01-01 VITALS
OXYGEN SATURATION: 97 % | SYSTOLIC BLOOD PRESSURE: 178 MMHG | TEMPERATURE: 97 F | DIASTOLIC BLOOD PRESSURE: 78 MMHG | BODY MASS INDEX: 24.01 KG/M2 | WEIGHT: 128.81 LBS | HEART RATE: 60 BPM | HEIGHT: 61.5 IN | RESPIRATION RATE: 18 BRPM

## 2020-01-01 VITALS
TEMPERATURE: 98 F | BODY MASS INDEX: 28.96 KG/M2 | HEART RATE: 66 BPM | SYSTOLIC BLOOD PRESSURE: 157 MMHG | DIASTOLIC BLOOD PRESSURE: 47 MMHG | RESPIRATION RATE: 19 BRPM | HEIGHT: 61.5 IN | OXYGEN SATURATION: 99 % | WEIGHT: 155.38 LBS

## 2020-01-01 VITALS
HEART RATE: 64 BPM | HEIGHT: 61.5 IN | DIASTOLIC BLOOD PRESSURE: 78 MMHG | OXYGEN SATURATION: 99 % | WEIGHT: 133 LBS | RESPIRATION RATE: 16 BRPM | BODY MASS INDEX: 24.79 KG/M2 | SYSTOLIC BLOOD PRESSURE: 134 MMHG | TEMPERATURE: 98 F

## 2020-01-01 VITALS
SYSTOLIC BLOOD PRESSURE: 132 MMHG | HEART RATE: 62 BPM | HEIGHT: 62 IN | WEIGHT: 127 LBS | DIASTOLIC BLOOD PRESSURE: 60 MMHG | BODY MASS INDEX: 23.37 KG/M2

## 2020-01-01 VITALS
SYSTOLIC BLOOD PRESSURE: 132 MMHG | OXYGEN SATURATION: 98 % | TEMPERATURE: 98 F | HEART RATE: 68 BPM | BODY MASS INDEX: 26.09 KG/M2 | HEIGHT: 61.5 IN | WEIGHT: 140 LBS | DIASTOLIC BLOOD PRESSURE: 56 MMHG | RESPIRATION RATE: 18 BRPM

## 2020-01-01 VITALS
BODY MASS INDEX: 26.47 KG/M2 | DIASTOLIC BLOOD PRESSURE: 68 MMHG | RESPIRATION RATE: 16 BRPM | HEIGHT: 61.5 IN | WEIGHT: 142 LBS | TEMPERATURE: 98 F | SYSTOLIC BLOOD PRESSURE: 122 MMHG | HEART RATE: 72 BPM

## 2020-01-01 VITALS
SYSTOLIC BLOOD PRESSURE: 138 MMHG | WEIGHT: 140 LBS | HEART RATE: 60 BPM | DIASTOLIC BLOOD PRESSURE: 54 MMHG | HEIGHT: 62 IN | BODY MASS INDEX: 25.76 KG/M2

## 2020-01-01 VITALS
DIASTOLIC BLOOD PRESSURE: 67 MMHG | RESPIRATION RATE: 16 BRPM | SYSTOLIC BLOOD PRESSURE: 176 MMHG | WEIGHT: 142 LBS | BODY MASS INDEX: 26.47 KG/M2 | HEART RATE: 60 BPM | OXYGEN SATURATION: 96 % | HEIGHT: 61.5 IN

## 2020-01-01 VITALS — DIASTOLIC BLOOD PRESSURE: 72 MMHG | WEIGHT: 136 LBS | BODY MASS INDEX: 25 KG/M2 | SYSTOLIC BLOOD PRESSURE: 144 MMHG

## 2020-01-01 DIAGNOSIS — I25.810 CORONARY ARTERY DISEASE INVOLVING CORONARY BYPASS GRAFT OF NATIVE HEART WITHOUT ANGINA PECTORIS: ICD-10-CM

## 2020-01-01 DIAGNOSIS — Z99.89 OSA ON CPAP: ICD-10-CM

## 2020-01-01 DIAGNOSIS — D72.829 LEUKOCYTOSIS, UNSPECIFIED TYPE: ICD-10-CM

## 2020-01-01 DIAGNOSIS — I82.C12 INTERNAL JUGULAR VEIN THROMBOSIS, LEFT (HCC): ICD-10-CM

## 2020-01-01 DIAGNOSIS — D72.829 LEUKOCYTOSIS, UNSPECIFIED TYPE: Primary | ICD-10-CM

## 2020-01-01 DIAGNOSIS — D75.81 MYELOFIBROSIS (HCC): ICD-10-CM

## 2020-01-01 DIAGNOSIS — Z15.89 JAK2 GENE MUTATION: ICD-10-CM

## 2020-01-01 DIAGNOSIS — F32.0 MILD SINGLE CURRENT EPISODE OF MAJOR DEPRESSIVE DISORDER (HCC): ICD-10-CM

## 2020-01-01 DIAGNOSIS — Z95.0 CARDIAC PACEMAKER: ICD-10-CM

## 2020-01-01 DIAGNOSIS — I77.9 PERIPHERAL ARTERIAL OCCLUSIVE DISEASE (CMD): ICD-10-CM

## 2020-01-01 DIAGNOSIS — I24.8 DEMAND ISCHEMIA (HCC): ICD-10-CM

## 2020-01-01 DIAGNOSIS — I63.9 CEREBROVASCULAR ACCIDENT (CVA), UNSPECIFIED MECHANISM (CMD): Primary | ICD-10-CM

## 2020-01-01 DIAGNOSIS — R59.0 LYMPHADENOPATHY, MEDIASTINAL: ICD-10-CM

## 2020-01-01 DIAGNOSIS — E08.51 DIABETES MELLITUS DUE TO UNDERLYING CONDITION WITH DIABETIC PERIPHERAL ANGIOPATHY WITHOUT GANGRENE, WITH LONG-TERM CURRENT USE OF INSULIN (HCC): ICD-10-CM

## 2020-01-01 DIAGNOSIS — I63.9 CEREBROVASCULAR ACCIDENT (CVA), UNSPECIFIED MECHANISM (HCC): ICD-10-CM

## 2020-01-01 DIAGNOSIS — I49.5 SINUS NODE DYSFUNCTION (CMD): Primary | ICD-10-CM

## 2020-01-01 DIAGNOSIS — Z95.1 HX OF CABG: ICD-10-CM

## 2020-01-01 DIAGNOSIS — Z95.1 S/P CABG X 3: ICD-10-CM

## 2020-01-01 DIAGNOSIS — Z95.0 PACEMAKER: ICD-10-CM

## 2020-01-01 DIAGNOSIS — N39.0 URINARY TRACT INFECTION WITHOUT HEMATURIA, SITE UNSPECIFIED: ICD-10-CM

## 2020-01-01 DIAGNOSIS — N18.30 STAGE 3 CHRONIC KIDNEY DISEASE, UNSPECIFIED WHETHER STAGE 3A OR 3B CKD (HCC): ICD-10-CM

## 2020-01-01 DIAGNOSIS — E11.21 DIABETIC NEPHROPATHY ASSOCIATED WITH TYPE 2 DIABETES MELLITUS (HCC): ICD-10-CM

## 2020-01-01 DIAGNOSIS — G45.9 BRAIN TIA: ICD-10-CM

## 2020-01-01 DIAGNOSIS — I10 ESSENTIAL HYPERTENSION: ICD-10-CM

## 2020-01-01 DIAGNOSIS — Z91.81 AT RISK FOR FALLING: ICD-10-CM

## 2020-01-01 DIAGNOSIS — R59.0 MEDIASTINAL LYMPHADENOPATHY: ICD-10-CM

## 2020-01-01 DIAGNOSIS — I25.10 CORONARY ARTERY DISEASE INVOLVING NATIVE CORONARY ARTERY OF NATIVE HEART WITHOUT ANGINA PECTORIS: ICD-10-CM

## 2020-01-01 DIAGNOSIS — Z98.890 H/O PERIPHERAL ARTERY BYPASS: ICD-10-CM

## 2020-01-01 DIAGNOSIS — E11.3292 MILD NONPROLIFERATIVE DIABETIC RETINOPATHY OF LEFT EYE WITHOUT MACULAR EDEMA ASSOCIATED WITH TYPE 2 DIABETES MELLITUS (HCC): ICD-10-CM

## 2020-01-01 DIAGNOSIS — I73.9 PERIPHERAL VASCULAR DISEASE OF LOWER EXTREMITY (HCC): ICD-10-CM

## 2020-01-01 DIAGNOSIS — D75.81 MYELOFIBROSIS (HCC): Primary | ICD-10-CM

## 2020-01-01 DIAGNOSIS — E08.42 DIABETIC POLYNEUROPATHY ASSOCIATED WITH DIABETES MELLITUS DUE TO UNDERLYING CONDITION (HCC): ICD-10-CM

## 2020-01-01 DIAGNOSIS — I77.9 PAOD (PERIPHERAL ARTERIAL OCCLUSIVE DISEASE) (HCC): ICD-10-CM

## 2020-01-01 DIAGNOSIS — Z86.718 PERSONAL HISTORY OF VENOUS THROMBOSIS AND EMBOLISM: ICD-10-CM

## 2020-01-01 DIAGNOSIS — E78.5 DYSLIPIDEMIA: ICD-10-CM

## 2020-01-01 DIAGNOSIS — I10 BENIGN ESSENTIAL HTN: Primary | ICD-10-CM

## 2020-01-01 DIAGNOSIS — N18.30 CKD (CHRONIC KIDNEY DISEASE) STAGE 3, GFR 30-59 ML/MIN (HCC): ICD-10-CM

## 2020-01-01 DIAGNOSIS — Z45.018 ENCOUNTER FOR CARE OF PACEMAKER: ICD-10-CM

## 2020-01-01 DIAGNOSIS — G89.4 CHRONIC PAIN SYNDROME: ICD-10-CM

## 2020-01-01 DIAGNOSIS — I63.9 RECURRENT CEREBROVASCULAR ACCIDENTS (CVAS) (HCC): ICD-10-CM

## 2020-01-01 DIAGNOSIS — Z02.9 ENCOUNTERS FOR UNSPECIFIED ADMINISTRATIVE PURPOSE: ICD-10-CM

## 2020-01-01 DIAGNOSIS — E11.9 TYPE 2 DIABETES MELLITUS WITHOUT COMPLICATION, WITHOUT LONG-TERM CURRENT USE OF INSULIN (HCC): ICD-10-CM

## 2020-01-01 DIAGNOSIS — N18.30 CKD (CHRONIC KIDNEY DISEASE) STAGE 3, GFR 30-59 ML/MIN (HCC): Primary | ICD-10-CM

## 2020-01-01 DIAGNOSIS — I10 BENIGN ESSENTIAL HTN: ICD-10-CM

## 2020-01-01 DIAGNOSIS — I50.1 PULMONARY EDEMA CARDIAC CAUSE (HCC): ICD-10-CM

## 2020-01-01 DIAGNOSIS — I13.0 CARDIORENAL SYNDROME WITH RENAL FAILURE, STAGE 1-4 OR UNSPECIFIED CHRONIC KIDNEY DISEASE, WITH HEART FAILURE (HCC): ICD-10-CM

## 2020-01-01 DIAGNOSIS — N04.9 NEPHROTIC SYNDROME: ICD-10-CM

## 2020-01-01 DIAGNOSIS — H31.012: ICD-10-CM

## 2020-01-01 DIAGNOSIS — I73.9 CLAUDICATION (HCC): ICD-10-CM

## 2020-01-01 DIAGNOSIS — Z79.4 DIABETES MELLITUS DUE TO UNDERLYING CONDITION WITH DIABETIC PERIPHERAL ANGIOPATHY WITHOUT GANGRENE, WITH LONG-TERM CURRENT USE OF INSULIN (HCC): ICD-10-CM

## 2020-01-01 DIAGNOSIS — I73.9 PVD (PERIPHERAL VASCULAR DISEASE) (HCC): ICD-10-CM

## 2020-01-01 DIAGNOSIS — I63.9 CEREBROVASCULAR ACCIDENT (CVA), UNSPECIFIED MECHANISM (HCC): Primary | ICD-10-CM

## 2020-01-01 DIAGNOSIS — D64.9 NORMOCYTIC ANEMIA: ICD-10-CM

## 2020-01-01 DIAGNOSIS — Z95.0 CARDIAC PACEMAKER IN SITU: ICD-10-CM

## 2020-01-01 DIAGNOSIS — Z96.1 PSEUDOPHAKIA: ICD-10-CM

## 2020-01-01 DIAGNOSIS — Z95.5 HISTORY OF HEART ARTERY STENT: ICD-10-CM

## 2020-01-01 DIAGNOSIS — G47.33 OSA ON CPAP: ICD-10-CM

## 2020-01-01 DIAGNOSIS — I70.229 EXTREMITY ATHEROSCLEROSIS WITH RESTING PAIN (HCC): ICD-10-CM

## 2020-01-01 DIAGNOSIS — J44.9 CHRONIC OBSTRUCTIVE PULMONARY DISEASE, UNSPECIFIED COPD TYPE (HCC): ICD-10-CM

## 2020-01-01 DIAGNOSIS — D64.9 ANEMIA, UNSPECIFIED TYPE: ICD-10-CM

## 2020-01-01 DIAGNOSIS — N18.31 CHRONIC RENAL IMPAIRMENT, STAGE 3A (CMD): ICD-10-CM

## 2020-01-01 DIAGNOSIS — Z95.0 PRESENCE OF CARDIAC PACEMAKER: ICD-10-CM

## 2020-01-01 DIAGNOSIS — R55 SYNCOPE AND COLLAPSE: Primary | ICD-10-CM

## 2020-01-01 DIAGNOSIS — Z79.4 TYPE 2 DIABETES MELLITUS WITH DIABETIC PERIPHERAL ANGIOPATHY WITHOUT GANGRENE, WITH LONG-TERM CURRENT USE OF INSULIN (HCC): ICD-10-CM

## 2020-01-01 DIAGNOSIS — E11.51 TYPE 2 DIABETES MELLITUS WITH DIABETIC PERIPHERAL ANGIOPATHY WITHOUT GANGRENE, WITH LONG-TERM CURRENT USE OF INSULIN (HCC): ICD-10-CM

## 2020-01-01 DIAGNOSIS — M48.061 SPINAL STENOSIS OF LUMBAR REGION, UNSPECIFIED WHETHER NEUROGENIC CLAUDICATION PRESENT: ICD-10-CM

## 2020-01-01 DIAGNOSIS — Z00.00 ENCOUNTER FOR ANNUAL HEALTH EXAMINATION: Primary | ICD-10-CM

## 2020-01-01 DIAGNOSIS — R55 SYNCOPE AND COLLAPSE: ICD-10-CM

## 2020-01-01 DIAGNOSIS — R80.9 PROTEINURIA, UNSPECIFIED TYPE: ICD-10-CM

## 2020-01-01 DIAGNOSIS — M43.16 SPONDYLOLISTHESIS OF LUMBAR REGION: ICD-10-CM

## 2020-01-01 DIAGNOSIS — M79.7 FIBROMYALGIA: ICD-10-CM

## 2020-01-01 DIAGNOSIS — D72.828 CHRONIC NEUTROPHILIA: ICD-10-CM

## 2020-01-01 LAB
ALBUMIN SERPL-MCNC: 2.1 G/DL (ref 3.4–5)
ALBUMIN SERPL-MCNC: 2.3 G/DL (ref 3.4–5)
ALBUMIN SERPL-MCNC: 2.6 G/DL (ref 3.4–5)
ALBUMIN SERPL-MCNC: 2.6 G/DL (ref 3.4–5)
ALBUMIN/GLOB SERPL: 0.6 {RATIO} (ref 1–2)
ALBUMIN/GLOB SERPL: 0.6 {RATIO} (ref 1–2)
ALBUMIN/GLOB SERPL: 0.7 {RATIO} (ref 1–2)
ALBUMIN/GLOB SERPL: 0.7 {RATIO} (ref 1–2)
ALP LIVER SERPL-CCNC: 120 U/L (ref 55–142)
ALP LIVER SERPL-CCNC: 121 U/L (ref 55–142)
ALP LIVER SERPL-CCNC: 173 U/L (ref 55–142)
ALP LIVER SERPL-CCNC: 197 U/L (ref 55–142)
ALT SERPL-CCNC: 10 U/L (ref 13–56)
ALT SERPL-CCNC: 18 U/L (ref 13–56)
ALT SERPL-CCNC: 20 U/L (ref 13–56)
ALT SERPL-CCNC: <6 U/L (ref 13–56)
ANION GAP SERPL CALC-SCNC: 10 MMOL/L (ref 0–18)
ANION GAP SERPL CALC-SCNC: 11 MMOL/L (ref 0–18)
ANION GAP SERPL CALC-SCNC: 11 MMOL/L (ref 0–18)
ANION GAP SERPL CALC-SCNC: 14 MMOL/L (ref 0–18)
ANION GAP SERPL CALC-SCNC: 14 MMOL/L (ref 0–18)
ANION GAP SERPL CALC-SCNC: 16 MMOL/L (ref 0–18)
ANION GAP SERPL CALC-SCNC: 7 MMOL/L (ref 0–18)
ANION GAP SERPL CALC-SCNC: 8 MMOL/L (ref 0–18)
ANION GAP SERPL CALC-SCNC: 8 MMOL/L (ref 0–18)
ANION GAP SERPL CALC-SCNC: 9 MMOL/L (ref 0–18)
ANION GAP SERPL CALC-SCNC: 9 MMOL/L (ref 0–18)
ANTITHROMBIN, ANTIGEN: 75 %
APTT PPP: 35.6 SECONDS (ref 25.4–36.1)
APTT PPP: 41.7 SECONDS (ref 25.4–36.1)
AST SERPL-CCNC: 21 U/L (ref 15–37)
AST SERPL-CCNC: 26 U/L (ref 15–37)
AST SERPL-CCNC: 29 U/L (ref 15–37)
AST SERPL-CCNC: 36 U/L (ref 15–37)
ATRIAL RATE: 62 BPM
BASOPHILS # BLD AUTO: 0.07 X10(3) UL (ref 0–0.2)
BASOPHILS # BLD AUTO: 0.09 X10(3) UL (ref 0–0.2)
BASOPHILS # BLD AUTO: 0.1 X10(3) UL (ref 0–0.2)
BASOPHILS # BLD AUTO: 0.1 X10(3) UL (ref 0–0.2)
BASOPHILS NFR BLD AUTO: 0.3 %
BASOPHILS NFR BLD AUTO: 0.4 %
BASOPHILS NFR BLD AUTO: 0.4 %
BASOPHILS NFR BLD AUTO: 0.5 %
BETA 2 GLYCOPROTEIN 1 AB, IGG: 2.5 U/ML (ref ?–7)
BETA 2 GLYCOPROTEIN 1 AB, IGM: <2.9 U/ML (ref ?–7)
BILIRUB SERPL-MCNC: 0.5 MG/DL (ref 0.1–2)
BILIRUB SERPL-MCNC: 0.6 MG/DL (ref 0.1–2)
BILIRUB SERPL-MCNC: 0.8 MG/DL (ref 0.1–2)
BILIRUB SERPL-MCNC: 1 MG/DL (ref 0.1–2)
BILIRUB UR QL STRIP.AUTO: NEGATIVE
BUN BLD-MCNC: 32 MG/DL (ref 7–18)
BUN BLD-MCNC: 44 MG/DL (ref 7–18)
BUN BLD-MCNC: 46 MG/DL (ref 7–18)
BUN BLD-MCNC: 48 MG/DL (ref 7–18)
BUN BLD-MCNC: 50 MG/DL (ref 7–18)
BUN BLD-MCNC: 53 MG/DL (ref 7–18)
BUN BLD-MCNC: 55 MG/DL (ref 7–18)
BUN BLD-MCNC: 58 MG/DL (ref 7–18)
BUN BLD-MCNC: 59 MG/DL (ref 7–18)
BUN BLD-MCNC: 61 MG/DL (ref 7–18)
BUN BLD-MCNC: 63 MG/DL (ref 7–18)
BUN/CREAT SERPL: 11.7 (ref 10–20)
BUN/CREAT SERPL: 12.1 (ref 10–20)
BUN/CREAT SERPL: 12.2 (ref 10–20)
BUN/CREAT SERPL: 12.8 (ref 10–20)
BUN/CREAT SERPL: 13.4 (ref 10–20)
BUN/CREAT SERPL: 15 (ref 10–20)
BUN/CREAT SERPL: 16 (ref 10–20)
BUN/CREAT SERPL: 18.5 (ref 10–20)
BUN/CREAT SERPL: 23.5 (ref 10–20)
BUN/CREAT SERPL: 25 (ref 10–20)
BUN/CREAT SERPL: 26.4 (ref 10–20)
C DIFF TOX B STL QL: NEGATIVE
CALCIUM BLD-MCNC: 7.5 MG/DL (ref 8.5–10.1)
CALCIUM BLD-MCNC: 7.6 MG/DL (ref 8.5–10.1)
CALCIUM BLD-MCNC: 7.7 MG/DL (ref 8.5–10.1)
CALCIUM BLD-MCNC: 7.9 MG/DL (ref 8.5–10.1)
CALCIUM BLD-MCNC: 8 MG/DL (ref 8.5–10.1)
CALCIUM BLD-MCNC: 8 MG/DL (ref 8.5–10.1)
CALCIUM BLD-MCNC: 8.1 MG/DL (ref 8.5–10.1)
CALCIUM BLD-MCNC: 8.3 MG/DL (ref 8.5–10.1)
CALCIUM BLD-MCNC: 8.4 MG/DL (ref 8.5–10.1)
CALCIUM BLD-MCNC: 8.5 MG/DL (ref 8.5–10.1)
CALCIUM BLD-MCNC: 8.6 MG/DL (ref 8.5–10.1)
CARDIOLIPIN IGG SERPL-ACNC: 1.5 GPL (ref ?–10)
CARDIOLIPIN IGM SERPL-ACNC: 1.4 MPL (ref ?–10)
CD10 CELLS NFR SPEC: 9 %
CD11C CELLS NFR SPEC: 4 %
CD14 CELLS NFR SPEC: <1 %
CD19 CELLS NFR SPEC: 11 %
CD19/CD10 CELLS: <1 %
CD20 CELLS NFR SPEC: 4 %
CD22 CELLS NFR SPEC: 4 %
CD23 CELLS NFR SPEC: 3 %
CD3 CELLS NFR SPEC: 91 %
CD3+CD4+ CELLS NFR SPEC: 84 %
CD3+CD4+ CELLS/CD3+CD8+ CLL SPEC: 14
CD3+CD8+ CELLS NFR SPEC: 6 %
CD45 CELLS NFR SPEC: 100 %
CD5 CELLS NFR SPEC: 90 %
CD5/CD19 CELLS: 2 %
CD56 CELLS NFR SPEC: 5 %
CD7 CELLS NFR SPEC: 90 %
CELL SURF KAPPA/LAMBDA RATIO: 1.5
CELL SURF LAMBDA LIGHT CHAIN: 2 %
CELL SURFACE KAPPA LIGHT CHAIN: 3 %
CHLORIDE SERPL-SCNC: 103 MMOL/L (ref 98–112)
CHLORIDE SERPL-SCNC: 106 MMOL/L (ref 98–112)
CHLORIDE SERPL-SCNC: 109 MMOL/L (ref 98–112)
CHLORIDE SERPL-SCNC: 113 MMOL/L (ref 98–112)
CHLORIDE SERPL-SCNC: 114 MMOL/L (ref 98–112)
CHLORIDE SERPL-SCNC: 87 MMOL/L (ref 98–112)
CHLORIDE SERPL-SCNC: 90 MMOL/L (ref 98–112)
CHLORIDE SERPL-SCNC: 93 MMOL/L (ref 98–112)
CHLORIDE SERPL-SCNC: 93 MMOL/L (ref 98–112)
CHLORIDE SERPL-SCNC: 96 MMOL/L (ref 98–112)
CHLORIDE SERPL-SCNC: 97 MMOL/L (ref 98–112)
CHLORIDE UR-SCNC: 11 MMOL/L
CHOLEST SMN-MCNC: 173 MG/DL (ref ?–200)
CO2 SERPL-SCNC: 15 MMOL/L (ref 21–32)
CO2 SERPL-SCNC: 15 MMOL/L (ref 21–32)
CO2 SERPL-SCNC: 19 MMOL/L (ref 21–32)
CO2 SERPL-SCNC: 22 MMOL/L (ref 21–32)
CO2 SERPL-SCNC: 23 MMOL/L (ref 21–32)
CO2 SERPL-SCNC: 23 MMOL/L (ref 21–32)
CO2 SERPL-SCNC: 25 MMOL/L (ref 21–32)
CO2 SERPL-SCNC: 25 MMOL/L (ref 21–32)
CO2 SERPL-SCNC: 26 MMOL/L (ref 21–32)
CO2 SERPL-SCNC: 28 MMOL/L (ref 21–32)
CO2 SERPL-SCNC: 35 MMOL/L (ref 21–32)
CREAT BLD-MCNC: 1.74 MG/DL (ref 0.55–1.02)
CREAT BLD-MCNC: 1.87 MG/DL (ref 0.55–1.02)
CREAT BLD-MCNC: 1.92 MG/DL (ref 0.55–1.02)
CREAT BLD-MCNC: 2.13 MG/DL (ref 0.55–1.02)
CREAT BLD-MCNC: 2.7 MG/DL (ref 0.55–1.02)
CREAT BLD-MCNC: 3.62 MG/DL (ref 0.55–1.02)
CREAT BLD-MCNC: 3.97 MG/DL (ref 0.55–1.02)
CREAT BLD-MCNC: 4.53 MG/DL (ref 0.55–1.02)
CREAT BLD-MCNC: 4.78 MG/DL (ref 0.55–1.02)
CREAT BLD-MCNC: 5.04 MG/DL (ref 0.55–1.02)
CREAT BLD-MCNC: 5.18 MG/DL (ref 0.55–1.02)
CREAT UR-SCNC: 17.6 MG/DL
DEPRECATED HBV CORE AB SER IA-ACNC: 257.8 NG/ML (ref 18–340)
DEPRECATED RDW RBC AUTO: 59.7 FL (ref 35.1–46.3)
DEPRECATED RDW RBC AUTO: 60 FL (ref 35.1–46.3)
DEPRECATED RDW RBC AUTO: 60.6 FL (ref 35.1–46.3)
DEPRECATED RDW RBC AUTO: 60.7 FL (ref 35.1–46.3)
DEPRECATED RDW RBC AUTO: 60.9 FL (ref 35.1–46.3)
DEPRECATED RDW RBC AUTO: 60.9 FL (ref 35.1–46.3)
DEPRECATED RDW RBC AUTO: 62 FL (ref 35.1–46.3)
DEPRECATED RDW RBC AUTO: 62.1 FL (ref 35.1–46.3)
DRVVT LUPUS ANTICOAGULANT: NEGATIVE
DRVVT SCREEN RATIO: 0.89 (ref 0–1.29)
EOSINOPHIL # BLD AUTO: 0.32 X10(3) UL (ref 0–0.7)
EOSINOPHIL # BLD AUTO: 0.35 X10(3) UL (ref 0–0.7)
EOSINOPHIL # BLD AUTO: 0.39 X10(3) UL (ref 0–0.7)
EOSINOPHIL # BLD AUTO: 0.44 X10(3) UL (ref 0–0.7)
EOSINOPHIL # BLD AUTO: 0.57 X10(3) UL (ref 0–0.7)
EOSINOPHIL # BLD AUTO: 0.71 X10(3) UL (ref 0–0.7)
EOSINOPHIL NFR BLD AUTO: 1.7 %
EOSINOPHIL NFR BLD AUTO: 1.7 %
EOSINOPHIL NFR BLD AUTO: 1.9 %
EOSINOPHIL NFR BLD AUTO: 2.2 %
EOSINOPHIL NFR BLD AUTO: 2.5 %
EOSINOPHIL NFR BLD AUTO: 3.2 %
ERYTHROCYTE [DISTWIDTH] IN BLOOD BY AUTOMATED COUNT: 19.7 % (ref 11–15)
ERYTHROCYTE [DISTWIDTH] IN BLOOD BY AUTOMATED COUNT: 19.7 % (ref 11–15)
ERYTHROCYTE [DISTWIDTH] IN BLOOD BY AUTOMATED COUNT: 19.9 % (ref 11–15)
ERYTHROCYTE [DISTWIDTH] IN BLOOD BY AUTOMATED COUNT: 20.2 % (ref 11–15)
ERYTHROCYTE [DISTWIDTH] IN BLOOD BY AUTOMATED COUNT: 20.2 % (ref 11–15)
ERYTHROCYTE [DISTWIDTH] IN BLOOD BY AUTOMATED COUNT: 20.3 % (ref 11–15)
EST. AVERAGE GLUCOSE BLD GHB EST-MCNC: 166 MG/DL (ref 68–126)
F2 C.20210G>A GENO BLD/T: NORMAL
F5 P.R506Q BLD/T QL: NORMAL
FMC7 CELLS NFR SPEC: 4 %
GLOBULIN PLAS-MCNC: 3 G/DL (ref 2.8–4.4)
GLOBULIN PLAS-MCNC: 3.8 G/DL (ref 2.8–4.4)
GLOBULIN PLAS-MCNC: 3.9 G/DL (ref 2.8–4.4)
GLOBULIN PLAS-MCNC: 4.2 G/DL (ref 2.8–4.4)
GLUCOSE BLD-MCNC: 103 MG/DL (ref 70–99)
GLUCOSE BLD-MCNC: 106 MG/DL (ref 70–99)
GLUCOSE BLD-MCNC: 106 MG/DL (ref 70–99)
GLUCOSE BLD-MCNC: 109 MG/DL (ref 70–99)
GLUCOSE BLD-MCNC: 110 MG/DL (ref 70–99)
GLUCOSE BLD-MCNC: 114 MG/DL (ref 70–99)
GLUCOSE BLD-MCNC: 117 MG/DL (ref 70–99)
GLUCOSE BLD-MCNC: 118 MG/DL (ref 70–99)
GLUCOSE BLD-MCNC: 118 MG/DL (ref 70–99)
GLUCOSE BLD-MCNC: 121 MG/DL (ref 70–99)
GLUCOSE BLD-MCNC: 122 MG/DL (ref 70–99)
GLUCOSE BLD-MCNC: 123 MG/DL (ref 70–99)
GLUCOSE BLD-MCNC: 125 MG/DL (ref 70–99)
GLUCOSE BLD-MCNC: 129 MG/DL (ref 70–99)
GLUCOSE BLD-MCNC: 131 MG/DL (ref 70–99)
GLUCOSE BLD-MCNC: 132 MG/DL (ref 70–99)
GLUCOSE BLD-MCNC: 132 MG/DL (ref 70–99)
GLUCOSE BLD-MCNC: 135 MG/DL (ref 70–99)
GLUCOSE BLD-MCNC: 136 MG/DL (ref 70–99)
GLUCOSE BLD-MCNC: 136 MG/DL (ref 70–99)
GLUCOSE BLD-MCNC: 137 MG/DL (ref 70–99)
GLUCOSE BLD-MCNC: 138 MG/DL (ref 70–99)
GLUCOSE BLD-MCNC: 139 MG/DL (ref 70–99)
GLUCOSE BLD-MCNC: 142 MG/DL (ref 70–99)
GLUCOSE BLD-MCNC: 153 MG/DL (ref 70–99)
GLUCOSE BLD-MCNC: 153 MG/DL (ref 70–99)
GLUCOSE BLD-MCNC: 157 MG/DL (ref 70–99)
GLUCOSE BLD-MCNC: 157 MG/DL (ref 70–99)
GLUCOSE BLD-MCNC: 161 MG/DL (ref 70–99)
GLUCOSE BLD-MCNC: 164 MG/DL (ref 70–99)
GLUCOSE BLD-MCNC: 167 MG/DL (ref 70–99)
GLUCOSE BLD-MCNC: 172 MG/DL (ref 70–99)
GLUCOSE BLD-MCNC: 176 MG/DL (ref 70–99)
GLUCOSE BLD-MCNC: 179 MG/DL (ref 70–99)
GLUCOSE BLD-MCNC: 181 MG/DL (ref 70–99)
GLUCOSE BLD-MCNC: 192 MG/DL (ref 70–99)
GLUCOSE BLD-MCNC: 199 MG/DL (ref 70–99)
GLUCOSE BLD-MCNC: 229 MG/DL (ref 70–99)
GLUCOSE BLD-MCNC: 244 MG/DL (ref 70–99)
GLUCOSE BLD-MCNC: 78 MG/DL (ref 70–99)
GLUCOSE BLD-MCNC: 90 MG/DL (ref 70–99)
GLUCOSE BLD-MCNC: 91 MG/DL (ref 70–99)
GLUCOSE BLD-MCNC: 93 MG/DL (ref 70–99)
GLUCOSE BLD-MCNC: 98 MG/DL (ref 70–99)
GLUCOSE BLD-MCNC: 99 MG/DL (ref 70–99)
GLUCOSE BLD-MCNC: 99 MG/DL (ref 70–99)
GLUCOSE UR STRIP.AUTO-MCNC: >=500 MG/DL
HAV IGM SER QL: 3.1 MG/DL (ref 1.6–2.6)
HBA1C MFR BLD HPLC: 7.4 % (ref ?–5.7)
HCT VFR BLD AUTO: 39.4 % (ref 35–48)
HCT VFR BLD AUTO: 40.2 % (ref 35–48)
HCT VFR BLD AUTO: 40.5 % (ref 35–48)
HCT VFR BLD AUTO: 41 % (ref 35–48)
HCT VFR BLD AUTO: 41.4 % (ref 35–48)
HCT VFR BLD AUTO: 42.1 % (ref 35–48)
HCT VFR BLD AUTO: 43.1 % (ref 35–48)
HCT VFR BLD AUTO: 44.6 % (ref 35–48)
HCYS SERPL-SCNC: 10.3 UMOL/L (ref 3.2–10.7)
HDLC SERPL-MCNC: 34 MG/DL (ref 40–59)
HGB BLD-MCNC: 12 G/DL (ref 12–16)
HGB BLD-MCNC: 12.4 G/DL (ref 12–16)
HGB BLD-MCNC: 12.5 G/DL (ref 12–16)
HGB BLD-MCNC: 12.6 G/DL (ref 12–16)
HGB BLD-MCNC: 12.9 G/DL (ref 12–16)
HGB BLD-MCNC: 12.9 G/DL (ref 12–16)
HGB BLD-MCNC: 13.1 G/DL (ref 12–16)
HGB BLD-MCNC: 13.5 G/DL (ref 12–16)
HYALINE CASTS #/AREA URNS AUTO: PRESENT /LPF
IMM GRANULOCYTES # BLD AUTO: 0.17 X10(3) UL (ref 0–1)
IMM GRANULOCYTES # BLD AUTO: 0.18 X10(3) UL (ref 0–1)
IMM GRANULOCYTES # BLD AUTO: 0.23 X10(3) UL (ref 0–1)
IMM GRANULOCYTES # BLD AUTO: 0.24 X10(3) UL (ref 0–1)
IMM GRANULOCYTES # BLD AUTO: 0.27 X10(3) UL (ref 0–1)
IMM GRANULOCYTES # BLD AUTO: 0.28 X10(3) UL (ref 0–1)
IMM GRANULOCYTES NFR BLD: 0.8 %
IMM GRANULOCYTES NFR BLD: 1 %
IMM GRANULOCYTES NFR BLD: 1 %
IMM GRANULOCYTES NFR BLD: 1.2 %
IMM GRANULOCYTES NFR BLD: 1.2 %
IMM GRANULOCYTES NFR BLD: 1.3 %
INR BLD: 1.1 (ref 0.89–1.11)
IRON SATURATION: 32 % (ref 15–50)
IRON SERPL-MCNC: 65 UG/DL (ref 50–170)
JAK2 GENE, V617F MUTATION, QUALITATIVE: POSITIVE
LDH SERPL L TO P-CCNC: 465 U/L
LDLC SERPL CALC-MCNC: 117 MG/DL (ref ?–100)
LYMPHOCYTES # BLD AUTO: 0.81 X10(3) UL (ref 1–4)
LYMPHOCYTES # BLD AUTO: 0.82 X10(3) UL (ref 1–4)
LYMPHOCYTES # BLD AUTO: 0.84 X10(3) UL (ref 1–4)
LYMPHOCYTES # BLD AUTO: 1.14 X10(3) UL (ref 1–4)
LYMPHOCYTES # BLD AUTO: 1.24 X10(3) UL (ref 1–4)
LYMPHOCYTES # BLD AUTO: 1.35 X10(3) UL (ref 1–4)
LYMPHOCYTES NFR BLD AUTO: 3.6 %
LYMPHOCYTES NFR BLD AUTO: 4 %
LYMPHOCYTES NFR BLD AUTO: 4.4 %
LYMPHOCYTES NFR BLD AUTO: 5.2 %
LYMPHOCYTES NFR BLD AUTO: 5.9 %
LYMPHOCYTES NFR BLD AUTO: 6.9 %
M PROTEIN MFR SERPL ELPH: 5.1 G/DL (ref 6.4–8.2)
M PROTEIN MFR SERPL ELPH: 6.2 G/DL (ref 6.4–8.2)
M PROTEIN MFR SERPL ELPH: 6.4 G/DL (ref 6.4–8.2)
M PROTEIN MFR SERPL ELPH: 6.8 G/DL (ref 6.4–8.2)
MCH RBC QN AUTO: 25.5 PG (ref 26–34)
MCH RBC QN AUTO: 25.7 PG (ref 26–34)
MCH RBC QN AUTO: 25.8 PG (ref 26–34)
MCH RBC QN AUTO: 26.1 PG (ref 26–34)
MCH RBC QN AUTO: 26.2 PG (ref 26–34)
MCH RBC QN AUTO: 26.4 PG (ref 26–34)
MCH RBC QN AUTO: 26.6 PG (ref 26–34)
MCH RBC QN AUTO: 26.7 PG (ref 26–34)
MCHC RBC AUTO-ENTMCNC: 30.3 G/DL (ref 31–37)
MCHC RBC AUTO-ENTMCNC: 30.4 G/DL (ref 31–37)
MCHC RBC AUTO-ENTMCNC: 30.5 G/DL (ref 31–37)
MCHC RBC AUTO-ENTMCNC: 30.6 G/DL (ref 31–37)
MCHC RBC AUTO-ENTMCNC: 30.6 G/DL (ref 31–37)
MCHC RBC AUTO-ENTMCNC: 30.7 G/DL (ref 31–37)
MCHC RBC AUTO-ENTMCNC: 31.1 G/DL (ref 31–37)
MCHC RBC AUTO-ENTMCNC: 31.2 G/DL (ref 31–37)
MCV RBC AUTO: 83.8 FL (ref 80–100)
MCV RBC AUTO: 84.4 FL (ref 80–100)
MCV RBC AUTO: 84.8 FL (ref 80–100)
MCV RBC AUTO: 85 FL (ref 80–100)
MCV RBC AUTO: 85.1 FL (ref 80–100)
MCV RBC AUTO: 85.9 FL (ref 80–100)
MCV RBC AUTO: 86.2 FL (ref 80–100)
MCV RBC AUTO: 86.7 FL (ref 80–100)
MICROALBUMIN UR-MCNC: 142 MG/DL
MICROALBUMIN/CREAT 24H UR-RTO: 8068.2 UG/MG (ref ?–30)
MONOCYTES # BLD AUTO: 0.22 X10(3) UL (ref 0.1–1)
MONOCYTES # BLD AUTO: 0.33 X10(3) UL (ref 0.1–1)
MONOCYTES # BLD AUTO: 0.37 X10(3) UL (ref 0.1–1)
MONOCYTES # BLD AUTO: 0.45 X10(3) UL (ref 0.1–1)
MONOCYTES # BLD AUTO: 0.51 X10(3) UL (ref 0.1–1)
MONOCYTES # BLD AUTO: 0.53 X10(3) UL (ref 0.1–1)
MONOCYTES NFR BLD AUTO: 1.1 %
MONOCYTES NFR BLD AUTO: 1.5 %
MONOCYTES NFR BLD AUTO: 1.6 %
MONOCYTES NFR BLD AUTO: 2.3 %
MONOCYTES NFR BLD AUTO: 2.5 %
MONOCYTES NFR BLD AUTO: 2.7 %
NEUTROPHILS # BLD AUTO: 15.59 X10 (3) UL (ref 1.5–7.7)
NEUTROPHILS # BLD AUTO: 15.59 X10(3) UL (ref 1.5–7.7)
NEUTROPHILS # BLD AUTO: 16.7 X10 (3) UL (ref 1.5–7.7)
NEUTROPHILS # BLD AUTO: 16.7 X10(3) UL (ref 1.5–7.7)
NEUTROPHILS # BLD AUTO: 18.39 X10 (3) UL (ref 1.5–7.7)
NEUTROPHILS # BLD AUTO: 18.39 X10(3) UL (ref 1.5–7.7)
NEUTROPHILS # BLD AUTO: 19.45 X10 (3) UL (ref 1.5–7.7)
NEUTROPHILS # BLD AUTO: 19.45 X10(3) UL (ref 1.5–7.7)
NEUTROPHILS # BLD AUTO: 20.16 X10 (3) UL (ref 1.5–7.7)
NEUTROPHILS # BLD AUTO: 20.16 X10(3) UL (ref 1.5–7.7)
NEUTROPHILS # BLD AUTO: 21.63 X10 (3) UL (ref 1.5–7.7)
NEUTROPHILS # BLD AUTO: 21.63 X10(3) UL (ref 1.5–7.7)
NEUTROPHILS NFR BLD AUTO: 86.9 %
NEUTROPHILS NFR BLD AUTO: 87.7 %
NEUTROPHILS NFR BLD AUTO: 88.7 %
NEUTROPHILS NFR BLD AUTO: 89.7 %
NEUTROPHILS NFR BLD AUTO: 91.4 %
NEUTROPHILS NFR BLD AUTO: 91.6 %
NITRITE UR QL STRIP.AUTO: NEGATIVE
NONHDLC SERPL-MCNC: 139 MG/DL (ref ?–130)
NT-PROBNP SERPL-MCNC: ABNORMAL PG/ML (ref ?–450)
OSMOLALITY SERPL CALC.SUM OF ELEC: 280 MOSM/KG (ref 275–295)
OSMOLALITY SERPL CALC.SUM OF ELEC: 287 MOSM/KG (ref 275–295)
OSMOLALITY SERPL CALC.SUM OF ELEC: 290 MOSM/KG (ref 275–295)
OSMOLALITY SERPL CALC.SUM OF ELEC: 291 MOSM/KG (ref 275–295)
OSMOLALITY SERPL CALC.SUM OF ELEC: 292 MOSM/KG (ref 275–295)
OSMOLALITY SERPL CALC.SUM OF ELEC: 292 MOSM/KG (ref 275–295)
OSMOLALITY SERPL CALC.SUM OF ELEC: 297 MOSM/KG (ref 275–295)
OSMOLALITY SERPL CALC.SUM OF ELEC: 299 MOSM/KG (ref 275–295)
OSMOLALITY SERPL CALC.SUM OF ELEC: 300 MOSM/KG (ref 275–295)
OSMOLALITY SERPL CALC.SUM OF ELEC: 300 MOSM/KG (ref 275–295)
OSMOLALITY SERPL CALC.SUM OF ELEC: 302 MOSM/KG (ref 275–295)
OSMOLALITY UR: 284 MOSM/KG (ref 300–1300)
P AXIS: 67 DEGREES
P-R INTERVAL: 184 MS
P2Y12 REACTION UNITS: 42 PRU
PATIENT FASTING Y/N/NP: NO
PH UR STRIP.AUTO: 5 [PH] (ref 4.5–8)
PLATELET # BLD AUTO: 359 10(3)UL (ref 150–450)
PLATELET # BLD AUTO: 364 10(3)UL (ref 150–450)
PLATELET # BLD AUTO: 400 10(3)UL (ref 150–450)
PLATELET # BLD AUTO: 402 10(3)UL (ref 150–450)
PLATELET # BLD AUTO: 413 10(3)UL (ref 150–450)
PLATELET # BLD AUTO: 424 10(3)UL (ref 150–450)
PLATELET # BLD AUTO: 434 10(3)UL (ref 150–450)
PLATELET # BLD AUTO: 439 10(3)UL (ref 150–450)
PLATELET MORPHOLOGY: NORMAL
POTASSIUM SERPL-SCNC: 3.2 MMOL/L (ref 3.5–5.1)
POTASSIUM SERPL-SCNC: 3.7 MMOL/L (ref 3.5–5.1)
POTASSIUM SERPL-SCNC: 3.9 MMOL/L (ref 3.5–5.1)
POTASSIUM SERPL-SCNC: 4.1 MMOL/L (ref 3.5–5.1)
POTASSIUM SERPL-SCNC: 4.5 MMOL/L (ref 3.5–5.1)
POTASSIUM SERPL-SCNC: 4.6 MMOL/L (ref 3.5–5.1)
POTASSIUM SERPL-SCNC: 4.7 MMOL/L (ref 3.5–5.1)
POTASSIUM SERPL-SCNC: 4.8 MMOL/L (ref 3.5–5.1)
POTASSIUM SERPL-SCNC: 5.2 MMOL/L (ref 3.5–5.1)
POTASSIUM SERPL-SCNC: 5.2 MMOL/L (ref 3.5–5.1)
POTASSIUM SERPL-SCNC: 5.4 MMOL/L (ref 3.5–5.1)
POTASSIUM UR-SCNC: 43 MMOL/L
PROT UR STRIP.AUTO-MCNC: >=500 MG/DL
PROTEIN S FUNCTIONAL: 86 %
PSA SERPL DL<=0.01 NG/ML-MCNC: 14.5 SECONDS (ref 12.4–14.6)
PT(LUPUS): 13.4 SECONDS (ref 12.1–14.7)
Q-T INTERVAL: 482 MS
QRS DURATION: 108 MS
QTC CALCULATION (BEZET): 489 MS
R AXIS: -72 DEGREES
RBC # BLD AUTO: 4.68 X10(6)UL (ref 3.8–5.3)
RBC # BLD AUTO: 4.7 X10(6)UL (ref 3.8–5.3)
RBC # BLD AUTO: 4.73 X10(6)UL (ref 3.8–5.3)
RBC # BLD AUTO: 4.8 X10(6)UL (ref 3.8–5.3)
RBC # BLD AUTO: 4.88 X10(6)UL (ref 3.8–5.3)
RBC # BLD AUTO: 4.95 X10(6)UL (ref 3.8–5.3)
RBC # BLD AUTO: 5 X10(6)UL (ref 3.8–5.3)
RBC # BLD AUTO: 5.25 X10(6)UL (ref 3.8–5.3)
RBC UR QL AUTO: NEGATIVE
SARS-COV-2 RNA RESP QL NAA+PROBE: NOT DETECTED
SODIUM SERPL-SCNC: 129 MMOL/L (ref 136–145)
SODIUM SERPL-SCNC: 129 MMOL/L (ref 136–145)
SODIUM SERPL-SCNC: 131 MMOL/L (ref 136–145)
SODIUM SERPL-SCNC: 132 MMOL/L (ref 136–145)
SODIUM SERPL-SCNC: 133 MMOL/L (ref 136–145)
SODIUM SERPL-SCNC: 134 MMOL/L (ref 136–145)
SODIUM SERPL-SCNC: 136 MMOL/L (ref 136–145)
SODIUM SERPL-SCNC: 136 MMOL/L (ref 136–145)
SODIUM SERPL-SCNC: 137 MMOL/L (ref 136–145)
SODIUM SERPL-SCNC: 138 MMOL/L (ref 136–145)
SODIUM SERPL-SCNC: 139 MMOL/L (ref 136–145)
SODIUM SERPL-SCNC: 18 MMOL/L
SP GR UR STRIP.AUTO: 1.01 (ref 1–1.03)
STACLOT LA DELTA: 14.8 SECONDS (ref ?–8)
STACLOT LA: POSITIVE
T AXIS: 66 DEGREES
TOTAL IRON BINDING CAPACITY: 206 UG/DL (ref 240–450)
TRANSFERRIN SERPL-MCNC: 138 MG/DL (ref 200–360)
TRIGL SERPL-MCNC: 110 MG/DL (ref 30–149)
TROPONIN I SERPL-MCNC: <0.045 NG/ML (ref ?–0.04)
URATE SERPL-MCNC: 11.1 MG/DL (ref 2.6–6)
UROBILINOGEN UR STRIP.AUTO-MCNC: <2 MG/DL
VENTRICULAR RATE: 62 BPM
VLDLC SERPL CALC-MCNC: 22 MG/DL (ref 0–30)
WBC # BLD AUTO: 18 X10(3) UL (ref 4–11)
WBC # BLD AUTO: 18.6 X10(3) UL (ref 4–11)
WBC # BLD AUTO: 19.4 X10(3) UL (ref 4–11)
WBC # BLD AUTO: 20.1 X10(3) UL (ref 4–11)
WBC # BLD AUTO: 22 X10(3) UL (ref 4–11)
WBC # BLD AUTO: 23 X10(3) UL (ref 4–11)
WBC # BLD AUTO: 23.6 X10(3) UL (ref 4–11)
WBC # BLD AUTO: 27.4 X10(3) UL (ref 4–11)
WBC #/AREA URNS AUTO: >50 /HPF
WBC CLUMPS UR QL AUTO: PRESENT

## 2020-01-01 PROCEDURE — X1114 CARDIAC DEVICE HOME CHECK - REMOTE UNSCHEDULED: HCPCS | Performed by: INTERNAL MEDICINE

## 2020-01-01 PROCEDURE — 99233 SBSQ HOSP IP/OBS HIGH 50: CPT | Performed by: OTHER

## 2020-01-01 PROCEDURE — 36415 COLL VENOUS BLD VENIPUNCTURE: CPT

## 2020-01-01 PROCEDURE — 99232 SBSQ HOSP IP/OBS MODERATE 35: CPT | Performed by: INTERNAL MEDICINE

## 2020-01-01 PROCEDURE — 70498 CT ANGIOGRAPHY NECK: CPT | Performed by: EMERGENCY MEDICINE

## 2020-01-01 PROCEDURE — 99212 OFFICE O/P EST SF 10 MIN: CPT | Performed by: PHYSICIAN ASSISTANT

## 2020-01-01 PROCEDURE — 99223 1ST HOSP IP/OBS HIGH 75: CPT | Performed by: INTERNAL MEDICINE

## 2020-01-01 PROCEDURE — 99214 OFFICE O/P EST MOD 30 MIN: CPT | Performed by: INTERNAL MEDICINE

## 2020-01-01 PROCEDURE — 99220 INITIAL OBSERVATION CARE,LEVL III: CPT | Performed by: HOSPITALIST

## 2020-01-01 PROCEDURE — 85025 COMPLETE CBC W/AUTO DIFF WBC: CPT

## 2020-01-01 PROCEDURE — 93280 PM DEVICE PROGR EVAL DUAL: CPT | Performed by: INTERNAL MEDICINE

## 2020-01-01 PROCEDURE — 99232 SBSQ HOSP IP/OBS MODERATE 35: CPT | Performed by: HOSPITALIST

## 2020-01-01 PROCEDURE — 80053 COMPREHEN METABOLIC PANEL: CPT

## 2020-01-01 PROCEDURE — 93272 ECG/REVIEW INTERPRET ONLY: CPT | Performed by: INTERNAL MEDICINE

## 2020-01-01 PROCEDURE — 93294 REM INTERROG EVL PM/LDLS PM: CPT | Performed by: INTERNAL MEDICINE

## 2020-01-01 PROCEDURE — 70450 CT HEAD/BRAIN W/O DYE: CPT | Performed by: EMERGENCY MEDICINE

## 2020-01-01 PROCEDURE — 99225 SUBSEQUENT OBSERVATION CARE: CPT | Performed by: HOSPITALIST

## 2020-01-01 PROCEDURE — 99233 SBSQ HOSP IP/OBS HIGH 50: CPT | Performed by: INTERNAL MEDICINE

## 2020-01-01 PROCEDURE — 93270 REMOTE 30 DAY ECG REV/REPORT: CPT | Performed by: INTERNAL MEDICINE

## 2020-01-01 PROCEDURE — G0439 PPPS, SUBSEQ VISIT: HCPCS | Performed by: FAMILY MEDICINE

## 2020-01-01 PROCEDURE — 93306 TTE W/DOPPLER COMPLETE: CPT | Performed by: HOSPITALIST

## 2020-01-01 PROCEDURE — 1111F DSCHRG MED/CURRENT MED MERGE: CPT

## 2020-01-01 PROCEDURE — 82043 UR ALBUMIN QUANTITATIVE: CPT | Performed by: FAMILY MEDICINE

## 2020-01-01 PROCEDURE — 82570 ASSAY OF URINE CREATININE: CPT | Performed by: FAMILY MEDICINE

## 2020-01-01 PROCEDURE — 93271 ECG/MONITORING AND ANALYSIS: CPT | Performed by: INTERNAL MEDICINE

## 2020-01-01 PROCEDURE — 99213 OFFICE O/P EST LOW 20 MIN: CPT | Performed by: INTERNAL MEDICINE

## 2020-01-01 PROCEDURE — 99223 1ST HOSP IP/OBS HIGH 75: CPT | Performed by: OTHER

## 2020-01-01 PROCEDURE — 70496 CT ANGIOGRAPHY HEAD: CPT | Performed by: EMERGENCY MEDICINE

## 2020-01-01 PROCEDURE — 71045 X-RAY EXAM CHEST 1 VIEW: CPT | Performed by: EMERGENCY MEDICINE

## 2020-01-01 PROCEDURE — 93971 EXTREMITY STUDY: CPT | Performed by: HOSPITALIST

## 2020-01-01 PROCEDURE — 74018 RADEX ABDOMEN 1 VIEW: CPT | Performed by: INTERNAL MEDICINE

## 2020-01-01 PROCEDURE — 99239 HOSP IP/OBS DSCHRG MGMT >30: CPT | Performed by: INTERNAL MEDICINE

## 2020-01-01 PROCEDURE — 99214 OFFICE O/P EST MOD 30 MIN: CPT | Performed by: OTHER

## 2020-01-01 PROCEDURE — 1111F DSCHRG MED/CURRENT MED MERGE: CPT | Performed by: FAMILY MEDICINE

## 2020-01-01 PROCEDURE — 99496 TRANSJ CARE MGMT HIGH F2F 7D: CPT | Performed by: FAMILY MEDICINE

## 2020-01-01 PROCEDURE — 99213 OFFICE O/P EST LOW 20 MIN: CPT | Performed by: FAMILY MEDICINE

## 2020-01-01 PROCEDURE — 38222 DX BONE MARROW BX & ASPIR: CPT | Performed by: INTERNAL MEDICINE

## 2020-01-01 PROCEDURE — 70551 MRI BRAIN STEM W/O DYE: CPT | Performed by: PHYSICIAN ASSISTANT

## 2020-01-01 PROCEDURE — 87086 URINE CULTURE/COLONY COUNT: CPT | Performed by: FAMILY MEDICINE

## 2020-01-01 PROCEDURE — 99214 OFFICE O/P EST MOD 30 MIN: CPT | Performed by: NURSE PRACTITIONER

## 2020-01-01 RX ORDER — AMLODIPINE BESYLATE 5 MG/1
5 TABLET ORAL DAILY
COMMUNITY
Start: 2020-01-01 | End: 2020-01-01 | Stop reason: SDUPTHER

## 2020-01-01 RX ORDER — ACETAMINOPHEN 325 MG/1
650 TABLET ORAL EVERY 6 HOURS PRN
Status: DISCONTINUED | OUTPATIENT
Start: 2020-01-01 | End: 2020-01-01

## 2020-01-01 RX ORDER — ACETAMINOPHEN 650 MG/1
650 SUPPOSITORY RECTAL EVERY 6 HOURS PRN
Status: DISCONTINUED | OUTPATIENT
Start: 2020-01-01 | End: 2020-01-01

## 2020-01-01 RX ORDER — METOPROLOL SUCCINATE 50 MG/1
TABLET, EXTENDED RELEASE ORAL
Qty: 90 TABLET | Refills: 3 | Status: SHIPPED | OUTPATIENT
Start: 2020-01-01 | End: 2021-01-01 | Stop reason: DRUGHIGH

## 2020-01-01 RX ORDER — METOPROLOL SUCCINATE 25 MG/1
25 TABLET, EXTENDED RELEASE ORAL DAILY
Qty: 90 TABLET | Refills: 0 | Status: SHIPPED | OUTPATIENT
Start: 2020-01-01 | End: 2020-01-01

## 2020-01-01 RX ORDER — HYDRALAZINE HYDROCHLORIDE 20 MG/ML
10 INJECTION INTRAMUSCULAR; INTRAVENOUS ONCE
Status: COMPLETED | OUTPATIENT
Start: 2020-01-01 | End: 2020-01-01

## 2020-01-01 RX ORDER — METOPROLOL SUCCINATE 25 MG/1
TABLET, EXTENDED RELEASE ORAL
Qty: 90 TABLET | Refills: 0 | Status: SHIPPED | OUTPATIENT
Start: 2020-01-01 | End: 2020-01-01

## 2020-01-01 RX ORDER — HYDRALAZINE HYDROCHLORIDE 20 MG/ML
10 INJECTION INTRAMUSCULAR; INTRAVENOUS
Status: DISCONTINUED | OUTPATIENT
Start: 2020-01-01 | End: 2020-01-01

## 2020-01-01 RX ORDER — HYDROMORPHONE HYDROCHLORIDE 2 MG/1
2 TABLET ORAL EVERY 4 HOURS PRN
COMMUNITY
Start: 2019-10-31

## 2020-01-01 RX ORDER — HYDROMORPHONE HYDROCHLORIDE 2 MG/1
2 TABLET ORAL EVERY 4 HOURS PRN
Status: DISCONTINUED | OUTPATIENT
Start: 2020-01-01 | End: 2020-01-01

## 2020-01-01 RX ORDER — HYDRALAZINE HYDROCHLORIDE 10 MG/1
10 TABLET, FILM COATED ORAL 4 TIMES DAILY PRN
Qty: 90 TABLET | Refills: 0 | Status: ON HOLD | OUTPATIENT
Start: 2020-01-01 | End: 2021-01-01

## 2020-01-01 RX ORDER — METOPROLOL SUCCINATE 50 MG/1
50 TABLET, EXTENDED RELEASE ORAL 2 TIMES DAILY
COMMUNITY
End: 2020-01-01

## 2020-01-01 RX ORDER — PHENYLEPHRINE HCL IN 0.9% NACL 50MG/250ML
PLASTIC BAG, INJECTION (ML) INTRAVENOUS CONTINUOUS PRN
Status: DISCONTINUED | OUTPATIENT
Start: 2020-01-01 | End: 2020-01-01

## 2020-01-01 RX ORDER — METOPROLOL SUCCINATE 50 MG/1
TABLET, EXTENDED RELEASE ORAL
Qty: 90 TABLET | Refills: 0 | Status: SHIPPED | OUTPATIENT
Start: 2020-01-01 | End: 2020-01-01

## 2020-01-01 RX ORDER — AMLODIPINE BESYLATE 5 MG/1
5 TABLET ORAL 2 TIMES DAILY
Qty: 60 TABLET | Refills: 1 | Status: SHIPPED | OUTPATIENT
Start: 2020-01-01 | End: 2020-01-01

## 2020-01-01 RX ORDER — AMLODIPINE BESYLATE 5 MG/1
5 TABLET ORAL 2 TIMES DAILY
Status: DISCONTINUED | OUTPATIENT
Start: 2020-01-01 | End: 2020-01-01

## 2020-01-01 RX ORDER — CLOPIDOGREL BISULFATE 75 MG/1
75 TABLET ORAL DAILY
Qty: 90 TABLET | Refills: 2 | Status: SHIPPED | OUTPATIENT
Start: 2020-01-01 | End: 2020-01-01

## 2020-01-01 RX ORDER — ATORVASTATIN CALCIUM 40 MG/1
40 TABLET, FILM COATED ORAL NIGHTLY
Status: CANCELLED | OUTPATIENT
Start: 2020-01-01

## 2020-01-01 RX ORDER — BUMETANIDE 1 MG/1
1 TABLET ORAL DAILY
Qty: 90 TABLET | Refills: 1 | Status: SHIPPED | OUTPATIENT
Start: 2020-01-01 | End: 2020-01-01 | Stop reason: DRUGHIGH

## 2020-01-01 RX ORDER — HYDROXYUREA 500 MG/1
500 CAPSULE ORAL DAILY
Qty: 30 CAPSULE | Refills: 3 | Status: SHIPPED | OUTPATIENT
Start: 2020-01-01 | End: 2021-01-01

## 2020-01-01 RX ORDER — HEPARIN SODIUM 5000 [USP'U]/ML
5000 INJECTION, SOLUTION INTRAVENOUS; SUBCUTANEOUS EVERY 12 HOURS SCHEDULED
Status: DISCONTINUED | OUTPATIENT
Start: 2020-01-01 | End: 2020-01-01

## 2020-01-01 RX ORDER — BUMETANIDE 1 MG/1
1 TABLET ORAL DAILY
Status: ON HOLD | COMMUNITY
End: 2021-01-01

## 2020-01-01 RX ORDER — AMLODIPINE BESYLATE 5 MG/1
5 TABLET ORAL 2 TIMES DAILY
Qty: 180 TABLET | Refills: 1 | Status: SHIPPED | OUTPATIENT
Start: 2020-01-01 | End: 2020-01-01

## 2020-01-01 RX ORDER — BUMETANIDE 1 MG/1
1 TABLET ORAL DAILY
COMMUNITY
End: 2020-01-01

## 2020-01-01 RX ORDER — METOPROLOL SUCCINATE 50 MG/1
TABLET, EXTENDED RELEASE ORAL
Qty: 90 TABLET | Refills: 0 | Status: SHIPPED | OUTPATIENT
Start: 2020-01-01 | End: 2020-01-01 | Stop reason: DRUGHIGH

## 2020-01-01 RX ORDER — SODIUM CHLORIDE 9 MG/ML
INJECTION, SOLUTION INTRAVENOUS CONTINUOUS
Status: DISCONTINUED | OUTPATIENT
Start: 2020-01-01 | End: 2020-01-01

## 2020-01-01 RX ORDER — DIPHENHYDRAMINE HYDROCHLORIDE 50 MG/ML
50 INJECTION INTRAMUSCULAR; INTRAVENOUS ONCE
Status: COMPLETED | OUTPATIENT
Start: 2020-01-01 | End: 2020-01-01

## 2020-01-01 RX ORDER — FUROSEMIDE 10 MG/ML
40 INJECTION INTRAMUSCULAR; INTRAVENOUS ONCE
Status: COMPLETED | OUTPATIENT
Start: 2020-01-01 | End: 2020-01-01

## 2020-01-01 RX ORDER — CLOPIDOGREL BISULFATE 75 MG/1
75 TABLET ORAL DAILY
Status: DISCONTINUED | OUTPATIENT
Start: 2020-01-01 | End: 2020-01-01

## 2020-01-01 RX ORDER — LABETALOL HYDROCHLORIDE 5 MG/ML
10 INJECTION, SOLUTION INTRAVENOUS EVERY 10 MIN PRN
Status: DISCONTINUED | OUTPATIENT
Start: 2020-01-01 | End: 2020-01-01

## 2020-01-01 RX ORDER — AMLODIPINE BESYLATE 5 MG/1
5 TABLET ORAL DAILY
Qty: 90 TABLET | Refills: 3 | Status: SHIPPED | OUTPATIENT
Start: 2020-01-01 | End: 2021-10-15

## 2020-01-01 RX ORDER — METOPROLOL SUCCINATE 50 MG/1
50 TABLET, EXTENDED RELEASE ORAL 2 TIMES DAILY
COMMUNITY
Start: 2020-01-01 | End: 2021-01-01 | Stop reason: SDUPTHER

## 2020-01-01 RX ORDER — INSULIN DETEMIR 100 [IU]/ML
INJECTION, SOLUTION SUBCUTANEOUS NIGHTLY
COMMUNITY

## 2020-01-01 RX ORDER — HYDRALAZINE HYDROCHLORIDE 10 MG/1
10 TABLET, FILM COATED ORAL 4 TIMES DAILY PRN
COMMUNITY
Start: 2020-01-01

## 2020-01-01 RX ORDER — ONDANSETRON 2 MG/ML
4 INJECTION INTRAMUSCULAR; INTRAVENOUS EVERY 6 HOURS PRN
Status: DISCONTINUED | OUTPATIENT
Start: 2020-01-01 | End: 2020-01-01

## 2020-01-01 RX ORDER — METOPROLOL SUCCINATE 25 MG/1
TABLET, EXTENDED RELEASE ORAL
COMMUNITY
Start: 2020-01-01 | End: 2020-01-01 | Stop reason: ALTCHOICE

## 2020-01-01 RX ORDER — AMLODIPINE BESYLATE 5 MG/1
5 TABLET ORAL DAILY
Status: DISCONTINUED | OUTPATIENT
Start: 2020-01-01 | End: 2020-01-01

## 2020-01-01 RX ORDER — ASPIRIN 81 MG/1
81 TABLET, CHEWABLE ORAL DAILY
Status: DISCONTINUED | OUTPATIENT
Start: 2020-01-01 | End: 2020-01-01

## 2020-01-01 RX ORDER — FAMOTIDINE 20 MG/1
20 TABLET ORAL DAILY
Status: DISCONTINUED | OUTPATIENT
Start: 2020-01-01 | End: 2020-01-01

## 2020-01-01 RX ORDER — FAMOTIDINE 10 MG/ML
20 INJECTION, SOLUTION INTRAVENOUS NIGHTLY
Status: DISCONTINUED | OUTPATIENT
Start: 2020-01-01 | End: 2020-01-01

## 2020-01-01 RX ORDER — FAMOTIDINE 10 MG/ML
20 INJECTION, SOLUTION INTRAVENOUS DAILY
Status: DISCONTINUED | OUTPATIENT
Start: 2020-01-01 | End: 2020-01-01

## 2020-01-01 RX ORDER — SODIUM BICARBONATE 650 MG/1
650 TABLET ORAL 2 TIMES DAILY
Qty: 180 TABLET | Refills: 0 | Status: SHIPPED | OUTPATIENT
Start: 2020-01-01 | End: 2020-01-01 | Stop reason: DRUGHIGH

## 2020-01-01 RX ORDER — SODIUM BICARBONATE 650 MG/1
650 TABLET ORAL AS NEEDED
COMMUNITY
End: 2021-01-01 | Stop reason: CLARIF

## 2020-01-01 RX ORDER — METOCLOPRAMIDE HYDROCHLORIDE 5 MG/ML
5 INJECTION INTRAMUSCULAR; INTRAVENOUS EVERY 8 HOURS PRN
Status: DISCONTINUED | OUTPATIENT
Start: 2020-01-01 | End: 2020-01-01

## 2020-01-01 RX ORDER — DEXTROSE MONOHYDRATE 25 G/50ML
50 INJECTION, SOLUTION INTRAVENOUS
Status: DISCONTINUED | OUTPATIENT
Start: 2020-01-01 | End: 2020-01-01

## 2020-01-01 RX ORDER — CLOPIDOGREL BISULFATE 75 MG/1
75 TABLET ORAL DAILY
Qty: 90 TABLET | Refills: 3 | Status: SHIPPED | OUTPATIENT
Start: 2020-01-01

## 2020-01-01 RX ORDER — METOCLOPRAMIDE HYDROCHLORIDE 5 MG/ML
10 INJECTION INTRAMUSCULAR; INTRAVENOUS ONCE
Status: COMPLETED | OUTPATIENT
Start: 2020-01-01 | End: 2020-01-01

## 2020-01-01 RX ORDER — BUMETANIDE 1 MG/1
1 TABLET ORAL DAILY
COMMUNITY

## 2020-01-01 RX ORDER — LOPERAMIDE HYDROCHLORIDE 2 MG/1
4 CAPSULE ORAL 4 TIMES DAILY PRN
Status: DISCONTINUED | OUTPATIENT
Start: 2020-01-01 | End: 2020-01-01

## 2020-01-01 RX ORDER — METOCLOPRAMIDE HYDROCHLORIDE 5 MG/ML
10 INJECTION INTRAMUSCULAR; INTRAVENOUS EVERY 8 HOURS PRN
Status: DISCONTINUED | OUTPATIENT
Start: 2020-01-01 | End: 2020-01-01

## 2020-01-01 RX ORDER — SODIUM BICARBONATE 325 MG/1
650 TABLET ORAL 2 TIMES DAILY
Status: DISCONTINUED | OUTPATIENT
Start: 2020-01-01 | End: 2020-01-01

## 2020-01-01 RX ORDER — METOPROLOL SUCCINATE 50 MG/1
50 TABLET, EXTENDED RELEASE ORAL
Status: DISCONTINUED | OUTPATIENT
Start: 2020-01-01 | End: 2020-01-01

## 2020-01-01 RX ORDER — CIPROFLOXACIN 500 MG/1
500 TABLET, FILM COATED ORAL
Status: DISCONTINUED | OUTPATIENT
Start: 2020-01-01 | End: 2020-01-01

## 2020-01-01 RX ORDER — METOPROLOL SUCCINATE 25 MG/1
TABLET, EXTENDED RELEASE ORAL
Qty: 90 TABLET | Refills: 0 | OUTPATIENT
Start: 2020-01-01

## 2020-01-01 SDOH — HEALTH STABILITY: MENTAL HEALTH: HOW OFTEN DO YOU HAVE A DRINK CONTAINING ALCOHOL?: NEVER

## 2020-01-01 SDOH — HEALTH STABILITY: MENTAL HEALTH: HOW OFTEN DO YOU HAVE A DRINK CONTAINING ALCOHOL?: NOT ASKED

## 2020-01-01 SDOH — SOCIAL STABILITY: SOCIAL NETWORK: ARE YOU MARRIED, WIDOWED, DIVORCED, SEPARATED, NEVER MARRIED, OR LIVING WITH A PARTNER?: MARRIED

## 2020-01-01 SDOH — HEALTH STABILITY: PHYSICAL HEALTH: ON AVERAGE, HOW MANY DAYS PER WEEK DO YOU ENGAGE IN MODERATE TO STRENUOUS EXERCISE (LIKE A BRISK WALK)?: 0 DAYS

## 2020-01-01 SDOH — HEALTH STABILITY: PHYSICAL HEALTH: ON AVERAGE, HOW MANY MINUTES DO YOU ENGAGE IN EXERCISE AT THIS LEVEL?: 0 MIN

## 2020-01-01 ASSESSMENT — PATIENT HEALTH QUESTIONNAIRE - PHQ9
SUM OF ALL RESPONSES TO PHQ9 QUESTIONS 1 AND 2: 0
SUM OF ALL RESPONSES TO PHQ9 QUESTIONS 1 AND 2: 0
2. FEELING DOWN, DEPRESSED OR HOPELESS: NOT AT ALL
2. FEELING DOWN, DEPRESSED OR HOPELESS: NOT AT ALL
1. LITTLE INTEREST OR PLEASURE IN DOING THINGS: NOT AT ALL
CLINICAL INTERPRETATION OF PHQ9 SCORE: NO FURTHER SCREENING NEEDED
SUM OF ALL RESPONSES TO PHQ9 QUESTIONS 1 AND 2: 0
2. FEELING DOWN, DEPRESSED OR HOPELESS: NOT AT ALL
SUM OF ALL RESPONSES TO PHQ9 QUESTIONS 1 AND 2: 0
CLINICAL INTERPRETATION OF PHQ9 SCORE: NO FURTHER SCREENING NEEDED
1. LITTLE INTEREST OR PLEASURE IN DOING THINGS: NOT AT ALL
SUM OF ALL RESPONSES TO PHQ9 QUESTIONS 1 AND 2: 0
CLINICAL INTERPRETATION OF PHQ9 SCORE: NO FURTHER SCREENING NEEDED
SUM OF ALL RESPONSES TO PHQ9 QUESTIONS 1 AND 2: 0
CLINICAL INTERPRETATION OF PHQ2 SCORE: NO FURTHER SCREENING NEEDED
1. LITTLE INTEREST OR PLEASURE IN DOING THINGS: NOT AT ALL
CLINICAL INTERPRETATION OF PHQ2 SCORE: NO FURTHER SCREENING NEEDED
CLINICAL INTERPRETATION OF PHQ2 SCORE: NO FURTHER SCREENING NEEDED

## 2020-01-01 ASSESSMENT — ENCOUNTER SYMPTOMS
WEIGHT GAIN: 0
WEIGHT LOSS: 0
HEMATOCHEZIA: 0
HEMATOCHEZIA: 0
BRUISES/BLEEDS EASILY: 0
COUGH: 0
DECREASED APPETITE: 0
WEIGHT LOSS: 0
ALLERGIC/IMMUNOLOGIC COMMENTS: NO NEW FOOD ALLERGIES
SHORTNESS OF BREATH: 0
DIAPHORESIS: 0
BRUISES/BLEEDS EASILY: 0
HEMOPTYSIS: 0
GASTROINTESTINAL NEGATIVE: 1
ALLERGIC/IMMUNOLOGIC COMMENTS: NO NEW FOOD ALLERGIES
NEUROLOGICAL NEGATIVE: 1
FEVER: 0
FEVER: 0
SUSPICIOUS LESIONS: 0
CHILLS: 0
CHILLS: 0
WEIGHT LOSS: 0
SYNCOPE: 0
WEIGHT GAIN: 0
SUSPICIOUS LESIONS: 0
HEMOPTYSIS: 0
COUGH: 0
WEIGHT GAIN: 0

## 2020-01-04 DIAGNOSIS — E11.9 TYPE 2 DIABETES MELLITUS WITHOUT COMPLICATION, WITHOUT LONG-TERM CURRENT USE OF INSULIN (HCC): Primary | ICD-10-CM

## 2020-01-04 DIAGNOSIS — I10 BENIGN ESSENTIAL HTN: ICD-10-CM

## 2020-01-06 ENCOUNTER — HOSPITAL ENCOUNTER (OUTPATIENT)
Dept: CARDIOLOGY CLINIC | Facility: HOSPITAL | Age: 75
Discharge: HOME | End: 2020-01-06
Attending: INTERNAL MEDICINE
Payer: MEDICARE

## 2020-01-06 DIAGNOSIS — I77.9 PERIPHERAL ARTERIAL OCCLUSIVE DISEASE (HCC): ICD-10-CM

## 2020-01-06 DIAGNOSIS — I73.9 CLAUDICATION (HCC): ICD-10-CM

## 2020-01-06 PROCEDURE — 93925 LOWER EXTREMITY STUDY: CPT | Performed by: INTERNAL MEDICINE

## 2020-01-06 RX ORDER — METOPROLOL SUCCINATE 50 MG/1
TABLET, EXTENDED RELEASE ORAL
Qty: 90 TABLET | Refills: 0 | Status: SHIPPED | OUTPATIENT
Start: 2020-01-06 | End: 2020-01-01

## 2020-01-06 NOTE — TELEPHONE ENCOUNTER
Rx Request  METOPROLOL SUCCINATE ER 50 MG Oral Tablet 24 Hr    Disp:      90              R: 0    Associated Dx: Benign essential HTN    Last Visit: 10/21/2019    Last Refilled: 10/14/2019    Protocol Passed?  Mahendra  ]       No[  ]

## 2020-01-07 ENCOUNTER — ANCILLARY ORDERS (OUTPATIENT)
Dept: CARDIOLOGY | Age: 75
End: 2020-01-07

## 2020-01-07 ENCOUNTER — ANCILLARY PROCEDURE (OUTPATIENT)
Dept: CARDIOLOGY | Age: 75
End: 2020-01-07
Attending: INTERNAL MEDICINE

## 2020-01-07 ENCOUNTER — TELEPHONE (OUTPATIENT)
Dept: CARDIOLOGY | Age: 75
End: 2020-01-07

## 2020-01-07 DIAGNOSIS — Z95.0 CARDIAC PACEMAKER IN SITU: ICD-10-CM

## 2020-01-07 PROCEDURE — X1114 CARDIAC DEVICE HOME CHECK - REMOTE UNSCHEDULED: HCPCS | Performed by: INTERNAL MEDICINE

## 2020-01-08 ENCOUNTER — APPOINTMENT (OUTPATIENT)
Dept: LAB | Age: 75
End: 2020-01-08
Attending: FAMILY MEDICINE
Payer: MEDICARE

## 2020-01-08 DIAGNOSIS — E11.9 TYPE 2 DIABETES MELLITUS WITHOUT COMPLICATION, WITHOUT LONG-TERM CURRENT USE OF INSULIN (HCC): ICD-10-CM

## 2020-01-08 DIAGNOSIS — I10 BENIGN ESSENTIAL HTN: ICD-10-CM

## 2020-01-08 LAB
ALBUMIN SERPL-MCNC: 3.1 G/DL (ref 3.4–5)
ALBUMIN/GLOB SERPL: 1 {RATIO} (ref 1–2)
ALP LIVER SERPL-CCNC: 120 U/L (ref 55–142)
ALT SERPL-CCNC: 15 U/L (ref 13–56)
ANION GAP SERPL CALC-SCNC: 8 MMOL/L (ref 0–18)
AST SERPL-CCNC: 17 U/L (ref 15–37)
BILIRUB SERPL-MCNC: 0.6 MG/DL (ref 0.1–2)
BUN BLD-MCNC: 27 MG/DL (ref 7–18)
BUN/CREAT SERPL: 20.9 (ref 10–20)
CALCIUM BLD-MCNC: 8.3 MG/DL (ref 8.5–10.1)
CHLORIDE SERPL-SCNC: 91 MMOL/L (ref 98–112)
CHOLEST SMN-MCNC: 250 MG/DL (ref ?–200)
CO2 SERPL-SCNC: 27 MMOL/L (ref 21–32)
CREAT BLD-MCNC: 1.29 MG/DL (ref 0.55–1.02)
CREAT UR-SCNC: 35.7 MG/DL
EST. AVERAGE GLUCOSE BLD GHB EST-MCNC: 192 MG/DL (ref 68–126)
GLOBULIN PLAS-MCNC: 3.2 G/DL (ref 2.8–4.4)
GLUCOSE BLD-MCNC: 159 MG/DL (ref 70–99)
HBA1C MFR BLD HPLC: 8.3 % (ref ?–5.7)
HDLC SERPL-MCNC: 41 MG/DL (ref 40–59)
LDLC SERPL CALC-MCNC: 181 MG/DL (ref ?–100)
M PROTEIN MFR SERPL ELPH: 6.3 G/DL (ref 6.4–8.2)
MICROALBUMIN UR-MCNC: 168 MG/DL
MICROALBUMIN/CREAT 24H UR-RTO: 4705.9 UG/MG (ref ?–30)
NONHDLC SERPL-MCNC: 209 MG/DL (ref ?–130)
OSMOLALITY SERPL CALC.SUM OF ELEC: 270 MOSM/KG (ref 275–295)
PATIENT FASTING Y/N/NP: YES
PATIENT FASTING Y/N/NP: YES
POTASSIUM SERPL-SCNC: 4.6 MMOL/L (ref 3.5–5.1)
SODIUM SERPL-SCNC: 126 MMOL/L (ref 136–145)
TRIGL SERPL-MCNC: 140 MG/DL (ref 30–149)
VLDLC SERPL CALC-MCNC: 28 MG/DL (ref 0–30)

## 2020-01-08 PROCEDURE — 80061 LIPID PANEL: CPT

## 2020-01-08 PROCEDURE — 36415 COLL VENOUS BLD VENIPUNCTURE: CPT

## 2020-01-08 PROCEDURE — 80053 COMPREHEN METABOLIC PANEL: CPT

## 2020-01-08 PROCEDURE — 82570 ASSAY OF URINE CREATININE: CPT

## 2020-01-08 PROCEDURE — 83036 HEMOGLOBIN GLYCOSYLATED A1C: CPT

## 2020-01-08 PROCEDURE — 82043 UR ALBUMIN QUANTITATIVE: CPT

## 2020-01-09 ENCOUNTER — TELEPHONE (OUTPATIENT)
Dept: CARDIOLOGY | Age: 75
End: 2020-01-09

## 2020-01-10 ENCOUNTER — OFFICE VISIT (OUTPATIENT)
Dept: FAMILY MEDICINE CLINIC | Facility: CLINIC | Age: 75
End: 2020-01-10
Payer: MEDICARE

## 2020-01-10 VITALS
HEART RATE: 61 BPM | HEIGHT: 60 IN | RESPIRATION RATE: 16 BRPM | BODY MASS INDEX: 26.11 KG/M2 | OXYGEN SATURATION: 98 % | TEMPERATURE: 98 F | SYSTOLIC BLOOD PRESSURE: 124 MMHG | DIASTOLIC BLOOD PRESSURE: 56 MMHG | WEIGHT: 133 LBS

## 2020-01-10 DIAGNOSIS — F32.0 MILD SINGLE CURRENT EPISODE OF MAJOR DEPRESSIVE DISORDER (HCC): ICD-10-CM

## 2020-01-10 DIAGNOSIS — I75.023 ATHEROEMBOLISM OF BOTH LOWER EXTREMITIES (HCC): ICD-10-CM

## 2020-01-10 DIAGNOSIS — I13.0 HYPERTENSIVE HEART AND CHRONIC KIDNEY DISEASE WITH HEART FAILURE AND STAGE 1 THROUGH STAGE 4 CHRONIC KIDNEY DISEASE, OR UNSPECIFIED CHRONIC KIDNEY DISEASE (HCC): ICD-10-CM

## 2020-01-10 DIAGNOSIS — N18.30 CKD (CHRONIC KIDNEY DISEASE) STAGE 3, GFR 30-59 ML/MIN (HCC): ICD-10-CM

## 2020-01-10 DIAGNOSIS — E08.42 DIABETIC POLYNEUROPATHY ASSOCIATED WITH DIABETES MELLITUS DUE TO UNDERLYING CONDITION (HCC): ICD-10-CM

## 2020-01-10 DIAGNOSIS — L97.921 SKIN ULCER OF LEFT LOWER LEG, LIMITED TO BREAKDOWN OF SKIN (HCC): ICD-10-CM

## 2020-01-10 DIAGNOSIS — E11.21 DIABETIC NEPHROPATHY ASSOCIATED WITH TYPE 2 DIABETES MELLITUS (HCC): Primary | ICD-10-CM

## 2020-01-10 DIAGNOSIS — J44.9 CHRONIC OBSTRUCTIVE PULMONARY DISEASE, UNSPECIFIED COPD TYPE (HCC): ICD-10-CM

## 2020-01-10 PROCEDURE — G8483 FLU IMM NO ADMIN DOC REA: HCPCS | Performed by: FAMILY MEDICINE

## 2020-01-10 PROCEDURE — 99214 OFFICE O/P EST MOD 30 MIN: CPT | Performed by: FAMILY MEDICINE

## 2020-01-10 RX ORDER — FLASH GLUCOSE SENSOR
1 KIT MISCELLANEOUS
Qty: 6 EACH | Refills: 3 | Status: SHIPPED | OUTPATIENT
Start: 2020-01-10 | End: 2020-01-01

## 2020-01-10 RX ORDER — FLASH GLUCOSE SCANNING READER
1 EACH MISCELLANEOUS 3 TIMES DAILY
Qty: 1 DEVICE | Refills: 1 | Status: SHIPPED | OUTPATIENT
Start: 2020-01-10 | End: 2020-01-20

## 2020-01-10 NOTE — PROGRESS NOTES
HPI:   Niki Young is a 75year old female who presents for recheck of her diabetes, PAD  and HTN    Pt has a h/o neuropathy.    Pt has arterial blockage of right leg / pt will see specialist soon     Pt is on a diet to thin her blood    Pt no longer has t 08/22/2008 40     ALT (U/L)   Date Value   01/08/2020 15   10/21/2019 13   09/28/2019 11 (L)   05/05/2014 26      Malb/Cre Calc   Date Value Ref Range Status   01/08/2020 4,705.9 (H) <=30.0 ug/mg Final     Comment:     <30 ug/mg creatinine       Normal Disorder of liver     enzymes were elevated recently   • Elevated cholesterol    • Elevated hemoglobin A1c    • Fibromyalgia    • Heart attack ([facility] [address] 03/2008    stents x2 at that time.     • Heart disease    • High blood pressure    • High cholesterol    • H Performed by Obed Giles MD at [facility]   • TONSILLECTOMY        Social History: Social History    Tobacco Use      Smoking status: Former Smoker        Packs/day: 2.50        Years: 9.00        Pack years: 22.5        Quit date: 5/23/1 importance of medication, diet adherence, skin care and routine exercise. Reviewed good diabetic foot care. Annual dilated eye exams reinforced. Routine accuchecks and diabetic labwork as discussed.   The patient indicates understanding of these issues Dispense: 6 each; Refill: 3    3.  CKD (chronic kidney disease) stage 3, GFR 30-59 ml/min ([facility])    - NEPHROLOGY - INTERNAL       LABS IN 3 MONTHS       Follow up on June AWV or sooner if needed

## 2020-01-13 ENCOUNTER — TELEPHONE (OUTPATIENT)
Dept: FAMILY MEDICINE CLINIC | Facility: CLINIC | Age: 75
End: 2020-01-13

## 2020-01-13 NOTE — TELEPHONE ENCOUNTER
PA approved [facility] sensor #[id number] 1/13/20 - 1/13/21    Called [employer] and spoke with [facility]

## 2020-01-20 ENCOUNTER — TELEPHONE (OUTPATIENT)
Dept: FAMILY MEDICINE CLINIC | Facility: CLINIC | Age: 75
End: 2020-01-20

## 2020-01-20 DIAGNOSIS — E08.42 DIABETIC POLYNEUROPATHY ASSOCIATED WITH DIABETES MELLITUS DUE TO UNDERLYING CONDITION (HCC): ICD-10-CM

## 2020-01-20 DIAGNOSIS — E11.21 DIABETIC NEPHROPATHY ASSOCIATED WITH TYPE 2 DIABETES MELLITUS (HCC): ICD-10-CM

## 2020-01-20 RX ORDER — FLASH GLUCOSE SCANNING READER
1 EACH MISCELLANEOUS 3 TIMES DAILY
Qty: 1 DEVICE | Refills: 1 | Status: SHIPPED | OUTPATIENT
Start: 2020-01-20 | End: 2020-01-01

## 2020-01-20 NOTE — TELEPHONE ENCOUNTER
PT NEEDS RX FOR FREE STYLE KIT SENSOR. SHE HAS APPROVAL LETTER AT HOME. [address]  ON FILE. Sarah Beth Fairchild [address] [place] [address].

## 2020-01-27 ENCOUNTER — APPOINTMENT (OUTPATIENT)
Dept: LAB | Facility: HOSPITAL | Age: 75
End: 2020-01-27
Attending: SURGERY
Payer: MEDICARE

## 2020-01-27 ENCOUNTER — TELEPHONE (OUTPATIENT)
Dept: NEPHROLOGY | Facility: CLINIC | Age: 75
End: 2020-01-27

## 2020-01-27 ENCOUNTER — LAB ENCOUNTER (OUTPATIENT)
Dept: LAB | Facility: HOSPITAL | Age: 75
End: 2020-01-27
Attending: SURGERY
Payer: MEDICARE

## 2020-01-27 DIAGNOSIS — Z91.89 AT RISK FOR INFECTION: ICD-10-CM

## 2020-01-27 DIAGNOSIS — I73.9 PERIPHERAL VASCULAR DISEASE OF LOWER EXTREMITY (HCC): ICD-10-CM

## 2020-01-27 DIAGNOSIS — Z01.818 PREOP TESTING: ICD-10-CM

## 2020-01-27 LAB
APTT PPP: 34.3 SECONDS (ref 25.4–36.1)
ATRIAL RATE: 71 BPM
BASOPHILS # BLD AUTO: 0.08 X10(3) UL (ref 0–0.2)
BASOPHILS NFR BLD AUTO: 0.4 %
DEPRECATED RDW RBC AUTO: 56.6 FL (ref 35.1–46.3)
EOSINOPHIL # BLD AUTO: 0.35 X10(3) UL (ref 0–0.7)
EOSINOPHIL NFR BLD AUTO: 1.7 %
ERYTHROCYTE [DISTWIDTH] IN BLOOD BY AUTOMATED COUNT: 16.9 % (ref 11–15)
HCT VFR BLD AUTO: 41.7 % (ref 35–48)
HGB BLD-MCNC: 12.4 G/DL (ref 12–16)
IMM GRANULOCYTES # BLD AUTO: 0.27 X10(3) UL (ref 0–1)
IMM GRANULOCYTES NFR BLD: 1.3 %
INR BLD: 1.05 (ref 0.9–1.1)
LYMPHOCYTES # BLD AUTO: 1 X10(3) UL (ref 1–4)
LYMPHOCYTES NFR BLD AUTO: 4.8 %
MCH RBC QN AUTO: 27.3 PG (ref 26–34)
MCHC RBC AUTO-ENTMCNC: 29.7 G/DL (ref 31–37)
MCV RBC AUTO: 91.9 FL (ref 80–100)
MONOCYTES # BLD AUTO: 0.43 X10(3) UL (ref 0.1–1)
MONOCYTES NFR BLD AUTO: 2.1 %
NEUTROPHILS # BLD AUTO: 18.69 X10 (3) UL (ref 1.5–7.7)
NEUTROPHILS # BLD AUTO: 18.69 X10(3) UL (ref 1.5–7.7)
NEUTROPHILS NFR BLD AUTO: 89.7 %
P AXIS: 12 DEGREES
P-R INTERVAL: 184 MS
PLATELET # BLD AUTO: 437 10(3)UL (ref 150–450)
PSA SERPL DL<=0.01 NG/ML-MCNC: 14.1 SECONDS (ref 12.5–14.7)
Q-T INTERVAL: 402 MS
QRS DURATION: 108 MS
QTC CALCULATION (BEZET): 436 MS
R AXIS: -72 DEGREES
RBC # BLD AUTO: 4.54 X10(6)UL (ref 3.8–5.3)
T AXIS: 68 DEGREES
VENTRICULAR RATE: 71 BPM
WBC # BLD AUTO: 20.8 X10(3) UL (ref 4–11)

## 2020-01-27 PROCEDURE — 93005 ELECTROCARDIOGRAM TRACING: CPT

## 2020-01-27 PROCEDURE — 85610 PROTHROMBIN TIME: CPT

## 2020-01-27 PROCEDURE — 85025 COMPLETE CBC W/AUTO DIFF WBC: CPT

## 2020-01-27 PROCEDURE — 93010 ELECTROCARDIOGRAM REPORT: CPT | Performed by: INTERNAL MEDICINE

## 2020-01-27 PROCEDURE — 36415 COLL VENOUS BLD VENIPUNCTURE: CPT

## 2020-01-27 PROCEDURE — 85730 THROMBOPLASTIN TIME PARTIAL: CPT

## 2020-01-28 ENCOUNTER — TELEPHONE (OUTPATIENT)
Dept: CARDIOLOGY | Age: 75
End: 2020-01-28

## 2020-01-28 NOTE — HISTORICAL OFFICE NOTE
Progress Notes  - documented in this encounter  Erica Prabhakar MD - 03/22/2019 11:30 AM CDT  Formatting of this note might be different from the original.  3/22/2019    [address] [place] [address] RSFA/popliteal endarterectomy with vein patch   • Other surgical history   hombectomy 2015   • Other surgical history   Cath with stent to RCA-3/07   • Peripheral angiogram   peripheral angiogram, plasty ant tib, popliteal R 7/2015, proximal LAD 10/25/20 (BUMEX) 2 MG tablet Take 2 mg by mouth 2 times daily.    • Probiotic Product (PROBIOTIC PO) 2 daily   • Acetylcysteine (N-ACETYL-L-CYSTEINE) 600 MG Cap 3 daily   • Ascorbic Acid (VITAMIN C) 1000 MG tablet as directed   • clopidogrel (PLAVIX) 75 MG tablet Ta reorder a lower extremity ultrasound to be done over the summer. Otherwise, may continue with her present regimen. I will see her back in about 6 months.     Lois Kocher, MD    03/22/19        Electronically signed by Lois Kocher, MD at 03/22/

## 2020-01-28 NOTE — TELEPHONE ENCOUNTER
D/W pt. Pt may proceed with PV angiogram / angioplasty from a renal standpoint- risk of contrast nephropathy is low.     Gema Kelley MD

## 2020-01-29 ENCOUNTER — HOSPITAL ENCOUNTER (OUTPATIENT)
Dept: INTERVENTIONAL RADIOLOGY/VASCULAR | Facility: HOSPITAL | Age: 75
Discharge: HOME | End: 2020-01-30
Attending: SURGERY | Admitting: SURGERY
Payer: MEDICARE

## 2020-01-29 DIAGNOSIS — I73.9 PVD (PERIPHERAL VASCULAR DISEASE) (HCC): ICD-10-CM

## 2020-01-29 LAB
GLUCOSE BLD-MCNC: 210 MG/DL (ref 70–99)
GLUCOSE BLD-MCNC: 224 MG/DL (ref 70–99)
GLUCOSE BLD-MCNC: 287 MG/DL (ref 70–99)
GLUCOSE BLD-MCNC: 59 MG/DL (ref 70–99)
GLUCOSE BLD-MCNC: 65 MG/DL (ref 70–99)
GLUCOSE BLD-MCNC: 87 MG/DL (ref 70–99)
GLUCOSE BLD-MCNC: 99 MG/DL (ref 70–99)

## 2020-01-29 PROCEDURE — B41FYZZ FLUOROSCOPY OF RIGHT LOWER EXTREMITY ARTERIES USING OTHER CONTRAST: ICD-10-PCS | Performed by: SURGERY

## 2020-01-29 PROCEDURE — 99220 INITIAL OBSERVATION CARE,LEVL III: CPT | Performed by: INTERNAL MEDICINE

## 2020-01-29 PROCEDURE — B41GYZZ FLUOROSCOPY OF LEFT LOWER EXTREMITY ARTERIES USING OTHER CONTRAST: ICD-10-PCS | Performed by: SURGERY

## 2020-01-29 RX ORDER — ASPIRIN 81 MG/1
81 TABLET, CHEWABLE ORAL DAILY
Status: DISCONTINUED | OUTPATIENT
Start: 2020-01-30 | End: 2020-01-30

## 2020-01-29 RX ORDER — DEXTROSE MONOHYDRATE 50 MG/ML
INJECTION, SOLUTION INTRAVENOUS CONTINUOUS
Status: DISCONTINUED | OUTPATIENT
Start: 2020-01-29 | End: 2020-01-29

## 2020-01-29 RX ORDER — HYDRALAZINE HYDROCHLORIDE 20 MG/ML
10 INJECTION INTRAMUSCULAR; INTRAVENOUS EVERY 6 HOURS PRN
Status: DISCONTINUED | OUTPATIENT
Start: 2020-01-29 | End: 2020-01-30

## 2020-01-29 RX ORDER — HEPARIN SODIUM 5000 [USP'U]/ML
5000 INJECTION, SOLUTION INTRAVENOUS; SUBCUTANEOUS EVERY 8 HOURS SCHEDULED
Status: DISCONTINUED | OUTPATIENT
Start: 2020-01-30 | End: 2020-01-30

## 2020-01-29 RX ORDER — DEXTROSE MONOHYDRATE 25 G/50ML
INJECTION, SOLUTION INTRAVENOUS
Status: COMPLETED
Start: 2020-01-29 | End: 2020-01-29

## 2020-01-29 RX ORDER — DEXTROSE MONOHYDRATE 25 G/50ML
50 INJECTION, SOLUTION INTRAVENOUS
Status: DISCONTINUED | OUTPATIENT
Start: 2020-01-29 | End: 2020-01-30

## 2020-01-29 RX ORDER — CLOPIDOGREL BISULFATE 75 MG/1
75 TABLET ORAL DAILY
Status: DISCONTINUED | OUTPATIENT
Start: 2020-01-29 | End: 2020-01-30

## 2020-01-29 RX ORDER — LIDOCAINE HYDROCHLORIDE 10 MG/ML
INJECTION, SOLUTION EPIDURAL; INFILTRATION; INTRACAUDAL; PERINEURAL
Status: COMPLETED
Start: 2020-01-29 | End: 2020-01-29

## 2020-01-29 RX ORDER — DEXTROSE MONOHYDRATE 25 G/50ML
50 INJECTION, SOLUTION INTRAVENOUS
Status: DISCONTINUED | OUTPATIENT
Start: 2020-01-29 | End: 2020-01-29

## 2020-01-29 RX ORDER — METOPROLOL SUCCINATE 50 MG/1
50 TABLET, EXTENDED RELEASE ORAL
Status: DISCONTINUED | OUTPATIENT
Start: 2020-01-29 | End: 2020-01-29

## 2020-01-29 RX ORDER — SODIUM CHLORIDE 9 MG/ML
INJECTION, SOLUTION INTRAVENOUS CONTINUOUS
Status: DISCONTINUED | OUTPATIENT
Start: 2020-01-29 | End: 2020-01-30

## 2020-01-29 RX ORDER — HYDRALAZINE HYDROCHLORIDE 20 MG/ML
INJECTION INTRAMUSCULAR; INTRAVENOUS
Status: COMPLETED
Start: 2020-01-29 | End: 2020-01-29

## 2020-01-29 RX ORDER — MIDAZOLAM HYDROCHLORIDE 1 MG/ML
INJECTION INTRAMUSCULAR; INTRAVENOUS
Status: COMPLETED
Start: 2020-01-29 | End: 2020-01-29

## 2020-01-29 RX ORDER — DEXTROSE MONOHYDRATE 25 G/50ML
50 INJECTION, SOLUTION INTRAVENOUS AS NEEDED
Status: DISCONTINUED | OUTPATIENT
Start: 2020-01-29 | End: 2020-01-30

## 2020-01-29 RX ORDER — HEPARIN SODIUM 5000 [USP'U]/ML
INJECTION, SOLUTION INTRAVENOUS; SUBCUTANEOUS
Status: COMPLETED
Start: 2020-01-29 | End: 2020-01-29

## 2020-01-29 RX ORDER — METOPROLOL SUCCINATE 25 MG/1
75 TABLET, EXTENDED RELEASE ORAL NIGHTLY
Status: DISCONTINUED | OUTPATIENT
Start: 2020-01-29 | End: 2020-01-30

## 2020-01-29 RX ADMIN — METOPROLOL SUCCINATE 75 MG: 25 TABLET, EXTENDED RELEASE ORAL at 20:19:00

## 2020-01-29 RX ADMIN — DEXTROSE MONOHYDRATE 25 ML: 25 INJECTION, SOLUTION INTRAVENOUS at 19:05:00

## 2020-01-29 RX ADMIN — HYDRALAZINE HYDROCHLORIDE 10 MG: 20 INJECTION INTRAMUSCULAR; INTRAVENOUS at 21:10:00

## 2020-01-29 RX ADMIN — SODIUM CHLORIDE: 9 INJECTION, SOLUTION INTRAVENOUS at 14:00:00

## 2020-01-29 NOTE — OPERATIVE REPORT
Pre-Procedure Dx: Hemodynamically failing femoral-popliteal bypass graft/ left SFA PTA. Post-procedure Dx: no significant hemodynamic stenosis.  Procedure: Duplex guided US Left femoral artery percutaneous/ Right femoral, left femoral catheter placement- Ri

## 2020-01-29 NOTE — H&P
[address]    PATIENT'S NAME: Janay Rolle   ATTENDING PHYSICIAN: Wes Jim M.D.    PATIENT ACCOUNT#:   [de-identified]    LOCATION:    MEDICAL RECORD #:   [id number]       YOB: 1945  ADMISSION DATE:       01/29/2020    HISTORY AND PHYS HISTORY:  She is . Denies any ETOH abuse, drug abuse, or tobacco abuse. She has 2 children. She is retired, used to be an . REVIEW OF SYSTEMS:  She currently denies any chest pain, shortness of breath.   She has no CVA, TIA, v understands and agrees. All questions were answered. Scheduled for the cardiac catheterization lab. Dictated By Stacy Escalante M.D.  d: 01/28/2020 17:05:40  t: 01/28/2020 17:25:32  [facility] [id number]  JJW/    cc: AUBREE Forte

## 2020-01-30 VITALS
OXYGEN SATURATION: 97 % | HEART RATE: 80 BPM | BODY MASS INDEX: 24.23 KG/M2 | RESPIRATION RATE: 18 BRPM | DIASTOLIC BLOOD PRESSURE: 45 MMHG | TEMPERATURE: 98 F | WEIGHT: 130 LBS | HEIGHT: 61.5 IN | SYSTOLIC BLOOD PRESSURE: 153 MMHG

## 2020-01-30 LAB
ANION GAP SERPL CALC-SCNC: 6 MMOL/L (ref 0–18)
BASOPHILS # BLD AUTO: 0.09 X10(3) UL (ref 0–0.2)
BASOPHILS NFR BLD AUTO: 0.5 %
BUN BLD-MCNC: 53 MG/DL (ref 7–18)
BUN/CREAT SERPL: 39.8 (ref 10–20)
CALCIUM BLD-MCNC: 8.4 MG/DL (ref 8.5–10.1)
CHLORIDE SERPL-SCNC: 113 MMOL/L (ref 98–112)
CO2 SERPL-SCNC: 25 MMOL/L (ref 21–32)
CREAT BLD-MCNC: 1.33 MG/DL (ref 0.55–1.02)
DEPRECATED RDW RBC AUTO: 56.5 FL (ref 35.1–46.3)
EOSINOPHIL # BLD AUTO: 0.52 X10(3) UL (ref 0–0.7)
EOSINOPHIL NFR BLD AUTO: 2.9 %
ERYTHROCYTE [DISTWIDTH] IN BLOOD BY AUTOMATED COUNT: 16.8 % (ref 11–15)
GLUCOSE BLD-MCNC: 137 MG/DL (ref 70–99)
GLUCOSE BLD-MCNC: 143 MG/DL (ref 70–99)
GLUCOSE BLD-MCNC: 229 MG/DL (ref 70–99)
HCT VFR BLD AUTO: 40.7 % (ref 35–48)
HGB BLD-MCNC: 12.1 G/DL (ref 12–16)
IMM GRANULOCYTES # BLD AUTO: 0.15 X10(3) UL (ref 0–1)
IMM GRANULOCYTES NFR BLD: 0.8 %
LYMPHOCYTES # BLD AUTO: 1.04 X10(3) UL (ref 1–4)
LYMPHOCYTES NFR BLD AUTO: 5.9 %
MCH RBC QN AUTO: 27.3 PG (ref 26–34)
MCHC RBC AUTO-ENTMCNC: 29.7 G/DL (ref 31–37)
MCV RBC AUTO: 91.7 FL (ref 80–100)
MONOCYTES # BLD AUTO: 0.28 X10(3) UL (ref 0.1–1)
MONOCYTES NFR BLD AUTO: 1.6 %
NEUTROPHILS # BLD AUTO: 15.58 X10 (3) UL (ref 1.5–7.7)
NEUTROPHILS # BLD AUTO: 15.58 X10(3) UL (ref 1.5–7.7)
NEUTROPHILS NFR BLD AUTO: 88.3 %
OSMOLALITY SERPL CALC.SUM OF ELEC: 315 MOSM/KG (ref 275–295)
PLATELET # BLD AUTO: 473 10(3)UL (ref 150–450)
POTASSIUM SERPL-SCNC: 5 MMOL/L (ref 3.5–5.1)
RBC # BLD AUTO: 4.44 X10(6)UL (ref 3.8–5.3)
SODIUM SERPL-SCNC: 144 MMOL/L (ref 136–145)
WBC # BLD AUTO: 17.7 X10(3) UL (ref 4–11)

## 2020-01-30 RX ADMIN — CLOPIDOGREL BISULFATE 75 MG: 75 TABLET ORAL at 09:19:00

## 2020-01-30 RX ADMIN — HYDRALAZINE HYDROCHLORIDE 10 MG: 20 INJECTION INTRAMUSCULAR; INTRAVENOUS at 09:54:00

## 2020-01-30 RX ADMIN — ASPIRIN 81 MG: 81 TABLET, CHEWABLE ORAL at 09:19:00

## 2020-01-30 NOTE — PLAN OF CARE
NURSING ADMISSION NOTE      Patient admitted via Cart  Oriented to room. Safety precautions initiated. Bed in low position.   Call light in reach    Received pt around 2000  L groin soft, no hematoma   L pedal pulse +1, R pedal pulse +2  Denies numbne

## 2020-01-30 NOTE — PROCEDURES
[facility]    PATIENT'S NAME: Justin Beck   ATTENDING PHYSICIAN: Abigail Howard M.D. OPERATING PHYSICIAN: Abigail Howard M.D.    PATIENT ACCOUNT#:   [de-identified]    LOCATION:  [place] [address] ED  MEDICAL RECORD #:   [id number]       DATE OF BI an angiogram to evaluate this to make sure and treat accordingly if necessary.   The risks and complications of death, myocardial infarction, bleeding, infection, graft thrombosis, limb loss, future graft thrombosis, future limb loss, renal failure, multisy disease distally of the peroneal artery, the posterior tibial artery, but main runoff was the anterior tibial artery, as it was 4-1/2 years ago, that extended down to the foot with an incomplete plantar arch. Films were reviewed with Dr. Toshia Ruiz.   Decided

## 2020-01-30 NOTE — PROGRESS NOTES
[facility]  Progress Note     Casandra Corcoran Charbel Patient Status:  Outpatient in a Bed    1945 MRN [id number]   [facility] 7NE-A Attending Rafat Noland MD   Hosp Day # 0 PCP Thierry Stephenson DO     Chief Complaint: hypoglicemia    S: Q8H [address]   • Insulin Aspart Pen  2-10 Units Subcutaneous TID CC and HS       ASSESSMENT / PLAN:     1. Hypoglycemia 2/2 Insulin use  1. Doubled her long acting dose, advised not to do so  2. HTN stable on home meds  PVD  With hx of femoral popliteal bypass o

## 2020-01-30 NOTE — PROGRESS NOTES
S/p PV angio. Pt A/Ox4. MONTESINOS. Left groin with dressing, CDI - soft, no hematoma, +pulses. Denies any pain. Voiding without difficulty. Patient has had hypoglycemia off/on most of the day and when BS was rechecked post-procedure, BS was 59.  Patient was g

## 2020-01-30 NOTE — H&P
[facility]  History and Physical     Dee Dee Derrick Barger Patient Status:  Observation    1945 MRN [id number]   [facility] 7NE-A Attending Radha Sahu MD   Hosp Day # 0 PCP Shruthi Alexis DO     Chief Complaint: hypoglycemia    Hi transfusions last one about 2015 or so.    • HTN (hypertension) 7/11/2013   • Intermittent claudication [facility])    • Neuropathy    • Osteoarthritis    • Peripheral vascular disease (HCC)     stents and bypass right leg, 2/2015   • Proteinuria    • Sleep apnea reports current drug use.     Family History:   Family History   Problem Relation Age of Onset   • Heart Disorder Father    • Diabetes Mother    • Diabetes Brother    • Heart Disorder Brother         Allergies:   Statins                 OTHER (SEE COMMENTS) SUCCINATE ER 50 MG Oral Tablet 24 Hr, TAKE 1 TABLET BY MOUTH  DAILY (Patient taking differently: Take 50 mg by mouth daily.  ), Disp: 90 tablet, Rfl: 0  BUMETANIDE 1 MG Oral Tab, TAKE 1 TABLET BY MOUTH TWO  TIMES DAILY (DIURETIC).  (Patient taking different Anicteric. Neck: No lymphadenopathy. No JVD. Respiratory: Clear to auscultation bilaterally. No wheezes. No rhonchi. Cardiovascular: S1, S2. Regular rate and rhythm. No murmurs, rubs or gallops. Equal pulses.    Chest and Back: No tenderness or deformit

## 2020-01-30 NOTE — PLAN OF CARE
NURSING DISCHARGE NOTE    Discharged Home via Wheelchair. Accompanied by Spouse  Belongings Taken by patient/family.     Received patient A/Ox4, RA, NSR a-Paced on tele at 0700  Patient refused heparin  Patient refused insulin, blood glucose was 229, p

## 2020-02-03 ENCOUNTER — TELEPHONE (OUTPATIENT)
Dept: CARDIOLOGY | Age: 75
End: 2020-02-03

## 2020-02-06 ENCOUNTER — TELEPHONE (OUTPATIENT)
Dept: FAMILY MEDICINE CLINIC | Facility: CLINIC | Age: 75
End: 2020-02-06

## 2020-02-06 RX ORDER — METOPROLOL SUCCINATE 25 MG/1
25 TABLET, EXTENDED RELEASE ORAL DAILY
Qty: 90 TABLET | Refills: 0 | Status: SHIPPED | OUTPATIENT
Start: 2020-02-06 | End: 2020-01-01 | Stop reason: DRUGHIGH

## 2020-02-06 NOTE — TELEPHONE ENCOUNTER
Patient takes both 25mg and 50 mg doses of Metoprolol. Last refill for 25mg dose was 10/21/19. Last visit 1/10/20.  1 refill given.

## 2020-03-16 NOTE — PROGRESS NOTES
Nephrology Progress Note      ASSESSMENT/PLAN:        1) Nephrotic syndrome / CKD 3- due to biopsy-proven diabetic nephropathy with severe arteriolosclerosis, interstitial fibrosis, tubular atrophy and significant glomerulosclerosis (11/22); her serum crea blood pressure    • High cholesterol    • History of blood transfusion     x2 transfusions last one about 2015 or so.    • HTN (hypertension) 7/11/2013   • Intermittent claudication [facility])    • Neuropathy    • Osteoarthritis    • Peripheral vascular disease Brother       Social History: Social History    Tobacco Use      Smoking status: Former Smoker        Packs/day: 2.50        Years: 9.00        Pack years: 22.5        Quit date: 1972        Years since quittin.8      Smokeless tobacco: Former Us tablet (75 mg total) by mouth daily. 30 tablet 11   • Specialty Vitamins Products (INULOSE BLOOD SUGAR SUPPORT) Oral Cap Take 1 capsule by mouth 2 (two) times daily. • Probiotic Product (PROBIOTIC DAILY OR) Take 2 capsules by mouth daily.          Janine Boyd Readings from Last 3 Encounters:  03/16/20 : 136 lb (61.7 kg)  01/28/20 : 130 lb (59 kg)  01/10/20 : 133 lb (60.3 kg)    General: Alert and oriented in no apparent distress.   HEENT: No scleral icterus, MMM  Neck: Supple, no ROMANA or thyromegaly  Cardiac: Reg

## 2020-05-07 NOTE — TELEPHONE ENCOUNTER
lantus is too costly, can you switch it to basaglar or tresiba? See message,    Also approve/deny rx? Last filled 2/6/20 x 3 months.  Last ov 1/10/20  Pt states she on both metroplol 25mg and 50mg

## 2020-05-07 NOTE — TELEPHONE ENCOUNTER
Pt states insurance will not cover Levemir, needs to change to Lantus or Toujeo for next refill.  Please advise

## 2020-05-07 NOTE — TELEPHONE ENCOUNTER
Pt was advised that insurance will no longer cover the levemir and she would need to contact primary to change to lantus, toujeo. Pt said it is not a rush as she received her last script recently [facility].

## 2020-05-07 NOTE — TELEPHONE ENCOUNTER
Patient called and stated that Lantus was $500.00    Her insurance will cover  $135.00 [facility]  [facility]    Would doctor approve one of those for the patient?     She is also in need of Metoprolol Succinate ER 25 MG Oral Tablet 24 Hr   Sh

## 2020-05-08 NOTE — TELEPHONE ENCOUNTER
Patient notified the basaglar/glargine was sent to [facility] Rx along with Metoprolol 25mg.   Med list updated

## 2020-06-25 NOTE — PLAN OF CARE
Assumed care at 0730  A&Ox4, VSS, Apaced on tele  Heparin gtt infusing at 10 ml/hr. Next PTT tomorrow AM  Redness and lump to left chest noted.  Dressing applied by wound care  Reports tenderness at the site  500ml bolus administered prior to CT as ordered
Assumed care at 1. Pt a/ox4. VSS. Apaced per tele, RA. Denies pain. Heparin drip infusing per order. Pt updated w/ POC. Will continue to monitor. Plan: transition to oral AC.     Problem: SKIN/TISSUE INTEGRITY - ADULT  Goal: Skin integrity remains
NURSING ADMISSION NOTE    Pt admitted from ED c/o LA, neck and let chest redness and swelling. Patient admitted via Cart. Oriented to room. Safety precautions initiated. Bed in low position. Call light in reach. Admission navigator completed.
NURSING DISCHARGE NOTE    Discharged Home via Wheelchair. Accompanied by Support staff  Belongings Taken by patient/family. Discharge instructions, medications, prescriptions, and follow up appointments discussed with patient.  All questions answe
General

## 2020-07-03 NOTE — TELEPHONE ENCOUNTER
Rx Request  Metoprolol Succinate ER 25 MG Oral Tablet 24 Hr    Disp:   90                 R: 0    Last Visit: 01/10/2020    Last Refilled: 05//08/2020    Protocol Passed?  Yes[ x ]       No[  ]

## 2020-07-07 PROBLEM — G45.9 BRAIN TIA: Status: ACTIVE | Noted: 2020-01-01

## 2020-07-07 PROBLEM — I63.9 RECURRENT CEREBROVASCULAR ACCIDENTS (CVAS) (HCC): Status: ACTIVE | Noted: 2020-01-01

## 2020-07-07 PROBLEM — N39.0 URINARY TRACT INFECTION WITHOUT HEMATURIA, SITE UNSPECIFIED: Status: ACTIVE | Noted: 2020-01-01

## 2020-07-07 PROBLEM — R55 SYNCOPE AND COLLAPSE: Status: ACTIVE | Noted: 2020-01-01

## 2020-07-07 PROBLEM — G45.9 TIA (TRANSIENT ISCHEMIC ATTACK): Status: ACTIVE | Noted: 2020-01-01

## 2020-07-07 NOTE — TELEPHONE ENCOUNTER
I called patient to let her know she is due for a follow up appt. Patient Stated she fell twice this Morning and is not feeling well. Her sx are Nausea fatigue and Dizziness.

## 2020-07-07 NOTE — H&P
[facility]  History and Physical     Kinza Justin Barger Patient Status:  Emergency    1945 MRN [id number]   Location [address] [facility] Attending Colette Shah MD   Hosp Day # 0 PCP Rob Sawyer DO     Chief Complaint: Elsie Deleon (CORONARY)  2014    RCA, ramus   • ANGIOPLASTY (CORONARY)      LAD,RCA   • ANGIOPLASTY (CORONARY)  2014    Rt common fem arthroplasry, stent   • ANGIOPLASTY (CORONARY)  5/12/15    right popliteal   • BYPASS SURGERY     •      • CABG   Oral, coumadin necrosis             Pt says she developed sores             Pt says she developed sores             Pt says she developed sores  Acetaminophen-Codei*    PAIN, OTHER (SEE COMMENTS)    Comment:Other reaction(s): Headache, HEADACHES  Alfuzosi as needed for Pain., Disp: 20 tablet, Rfl: 0  acetaminophen 500 MG Oral Tab, Take 500 mg by mouth every 6 (six) hours as needed for Pain., Disp: , Rfl:   Clopidogrel Bisulfate 75 MG Oral Tab, Take 1 tablet (75 mg total) by mouth daily. , Disp: 30 tablet, Rf results for input(s): PTP, INR in the last 168 hours. Recent Labs   Lab 07/07/20  1319   TROP <0.045       Imaging: Imaging data reviewed in Epic. ASSESSMENT / PLAN:     1.  Subacute vs chronic CVAs-resume usual plavix from home; await neuro recs;

## 2020-07-07 NOTE — TELEPHONE ENCOUNTER
Spoke with patient:  Patient reports she was having a sponge bath, fell while bent over washing her legs history broken hip last September, was able to get up on her own went to sit on bed for a bit then went to finish her sponge bath, fell a second time,

## 2020-07-07 NOTE — ED PROVIDER NOTES
Patient Seen in: [facility] Emergency Department      History   Patient presents with:  Dizziness  Nausea/Vomiting/Diarrhea  Fall    Stated Complaint: feeling dizzy, nauseated, fall in shower.  did not hit head but on plavix    HPI    67year-old fem sometimes when she is lifting her phone up with her left hand begins to shake. She did not have any issues with expressive aphasia or word salad with this episode no visual disturbance.     Past Medical History:   Diagnosis Date   • Arrhythmia    • Atheros Hiren Thomas MD at [facility]  abdominoplasty '98   • OTHER SURGICAL HISTORY  breast reduction '98   • OTHER SURGICAL HISTORY  right fem pop bypass 2/10/15    Dr. Estrellita Rolle   • OTHER SURGICAL HISTORY  07/16/2019    cysto dr Hayley Cruz   • [facility] Normocephalic and atraumatic. Eyes:      Extraocular Movements: Extraocular movements intact. Pupils: Pupils are equal, round, and reactive to light. Comments: No nystagmus   Neck:      Musculoskeletal: Normal range of motion and neck supple. Urine Trace (*)     Protein Urine >=500 (*)     Leukocyte Esterase Urine Moderate (*)     WBC Urine >50 (*)     RBC URINE 3-5 (*)     Bacteria Urine 3+ (*)     Amorphous Crystals Occasional (*)     Hyaline Casts Present (*)     Mucous Urine 1+ (*)     WBC EKG              Ct Brain Or Head (53134)    Result Date: 7/7/2020  PROCEDURE:  CT BRAIN OR HEAD (90971)  COMPARISON:  [facility] , CT, CT BRAIN OR HEAD (66561), 6/09/2019, 2:24 AM.  INDICATIONS:  feeling dizzy, nauseated, fall in shower.  did not hit head but Further assessment with MR imaging may be beneficial. 2. Mild diffuse atrophy and white matter disease consistent with chronic small vessel ischemic changes.  3. Atherosclerotic calcifications involving the intracranial portions of the internal carotid rose me about the episode that occurred 2 weeks ago where she had word salad.   I discussed with her that her blood pressure now was controlled and I was not so concerned about that as I was about the episode that occurred 2 weeks ago and the fact that now she i Reviewed: 3/16/2020          ICD-10-CM Noted POA    * (Principal) Syncope and collapse R55 7/7/2020     Brain TIA G45.9 7/7/2020     Hypertension I10 7/11/2013 Unknown    Recurrent cerebrovascular accidents (CVAs) ([facility] [address] I63.9 7/7/2020 Unknown    TIA (trans

## 2020-07-07 NOTE — ED INITIAL ASSESSMENT (HPI)
Pt states awoke this morning diaphoretic, states leaned over in the bathtub felt dizzy and fell. States was able to get out of the bathtub, states leaned over and had another fall. Pt denies c/o dizziness. States \"right now I feel ok\".   Pt states vomi

## 2020-07-08 NOTE — CONSULTS
[address] [facility]  7/8/2020    4:35 PM      REASON FOR EVALUATION: dizziness, strokes on CT     HISTORY OF PRESENT ILLNESS:  Patient is a 75F with PMH fibromyalgia, CAD s/p CABG and stents, CH Osteoarthritis     • Peripheral vascular disease ([facility] [address]       stents and bypass right leg, 2/2015   • Proteinuria     • Sleep apnea       CPAP   • Unspecified essential hypertension     • Visual impairment       scar on left retina; glasses         PAST SHARAN smokeless tobacco. She reports current alcohol use.  She reports current drug use.     ALLERGIES:     Statins                 OTHER (SEE COMMENTS)    Comment:Feet turn black, muscle weakness in the legs             CLASS, feet turned black             Feet SOLN, 100-200 mcg/min, Intravenous, Continuous PRN  ondansetron HCl (ZOFRAN) injection 4 mg, 4 mg, Intravenous, Q6H PRN    Or  Metoclopramide HCl (REGLAN) injection 10 mg, 10 mg, Intravenous, Q8H PRN  famoTIDine (PEPCID) tab 20 mg, 20 mg, Oral, Daily  gluc motions  ABD: Soft, non tender  SKIN: Warm, dry, no rashes     NEUROLOGICAL:   Mental status: Oriented to person, place, and time   Speech: Fluent, no dysarthria  Memory and comprehension: Intact   Cranial Nerves: VFF, PERRL 3mm brisk, EOMI, no nystagmus, calcifications involving the intracranial portions of the internal carotid arteries and V4 segments of the vertebral arteries.  These appear stable relative to prior imaging.           Patient with subacute infarcts on CTA, will check MRI Brain, multiple v

## 2020-07-08 NOTE — PHYSICAL THERAPY NOTE
Consult received, chart reviewed. Spoke with RN. Pt pending neurology and is on continued bedrest until this evening.   PT to evaluate when pt able to participate in mobility   AE PT DPT NCS

## 2020-07-08 NOTE — PROGRESS NOTES
[facility] Pharmacy Note:  Renal Adjustment for cefazolin (ANCEF)    Lakshmi Dhaliwal is a 67year old patient who has been prescribed cefazolin (ANCEF) 1 gm every 8 hrs. CrCl is estimated creatinine clearance is 19.8 mL/min (A) (based on SCr of 1.87 mg/dL (H)).  so

## 2020-07-08 NOTE — OCCUPATIONAL THERAPY NOTE
OCCUPATIONAL THERAPY QUICK EVALUATION - INPATIENT    Room Number: 1613/5532-U  Evaluation Date: 7/8/2020     Type of Evaluation: Quick Eval  Presenting Problem: L LE weakness, dizziness, L hemisphere infarct on CT    Physician Order: IP Consult to [facility])    • Coronary atherosclerosis     stents x8 total   • Deep vein thrombosis (HCC)     right leg   • DIABETES    • Diabetes ([address]    • Disorder of liver     enzymes were elevated recently   • Elevated cholesterol    • Elevated hemoglobin A1c    • Fibro Performed by Saida Arizmendi MD at [address] [place] [address]   • RIGHT PHACOEMULSIFICATION OF CATARACT WITH INTRAOCULAR LENS IMPLANT 72381 Left 6/23/2010    Performed by Saida Arizmendi MD at [address] [place] [address] taking off regular upper body clothing?: None  -   Taking care of personal grooming such as brushing teeth?: None  -   Eating meals?: None    AM-PAC Score:  Score: 24  Approx Degree of Impairment: 0%  Standardized Score (AM-PAC Scale): 57.54  CMS Modifier Complexity  LOW     OT Discharge Recommendations: Home       PLAN   Patient has been evaluated and presents with no skilled Occupational Therapy needs at this time. Patient discharged from Occupational Therapy services.   Please re-order if a new functiona

## 2020-07-08 NOTE — PHYSICAL THERAPY NOTE
PHYSICAL THERAPY QUICK EVALUATION - INPATIENT    Room Number: 0063/1383-L  Evaluation Date: 7/8/2020  Presenting Problem: syncope  Physician Order: PT Eval and Treat    Problem List  Principal Problem:    Syncope and collapse  Active Problems:    Hyperte • CATH PERCUTANEOUS  TRANSLUMINAL CORONARY ANGIOPLASTY      cardiac and peripheral   • CHOLECYSTECTOMY  '95   • FEMORAL PERIPHERAL BYPASS Right 2/10/2015    Performed by Christiane Cota MD at [address] [facility] N/A 2/20/2018 ACTIVITY TOLERANCE  Pulse: (60-69)  Heart Rate Source: Monitor     BP: (!) 174/42  BP Location: Left arm  BP Method: Automatic  Patient Position: Sitting    O2 WALK                  AM-PAC '6-Clicks' INPATIENT SHORT FORM - BASIC MOBILITY  How much diff indicate decreased fall risk. Based on this evaluation, patient's clinical presentation is stable and overall evaluation complexity is considered low.   Pt at modified [facility] levels near community level distances with RW, pt with appropriate safety awarenes

## 2020-07-08 NOTE — SLP NOTE
ADULT SWALLOWING EVALUATION    ASSESSMENT    ASSESSMENT/OVERALL IMPRESSION:  Patient seen for swallowing evaluation per stroke protocol. Patient alert and up in bed. She was admitted due to dizziness and fall.   She reports recent episode of texting but w • Arrhythmia    • Atherosclerosis of coronary artery    • BACK PAIN    • Back problem    • CAD (coronary artery disease) 7/11/2013   • Cataract     cataract sx,    • Colitis     50 years ago   • [facility] disease ([facility]    • Coronary atherosclerosi These appear stable relative to prior imaging. Dictated by: Wood Edgar DO on 7/07/2020 at 3:21 PM       Finalized by:  Wood Edgar DO on 7/07/2020 at 3:25 PM       SUBJECTIVE       OBJECTIVE   ORAL MOTOR EXAMINATION  Dentition: Natural;Functiona

## 2020-07-08 NOTE — PLAN OF CARE
Assumed care at 0700. Pt A/O x4. RA. NSR on tele. VSS. Neuros q4, see flow sheets. SBP maintained within ordered parameters. Echo completed. MRI ordered, pacemaker needs to be looked at. [facility] APN filled out form and form faxed down to MRI.  Pt worked with PT

## 2020-07-08 NOTE — CM/SW NOTE
07/08/20 1300   CM/SW Screening   Information Source Chart review;Nursing rounds   Patient's Mental Status Alert;Oriented   Patient's [address]   Patient lives with Spouse   Patient Status Prior to Admission   Independent with ADLs and Mobi

## 2020-07-08 NOTE — ICD/PM
Called by Jhonathan silva regarding remote interrogation transmission received today.   Interrogation reveals the following:    - No stored events  - Appropriate pacing and sensing thresholds  - Battery life ~6 years  - Device is MRI conditi

## 2020-07-08 NOTE — PROGRESS NOTES
[address] [facility] Neu[facility] Preliminary Note    Casandra [place] Charbel Patient Status:  Observation    1945 MRN [id number]   [facility] 7NE-A Attending Alicia Dunbar MD   Hosp Day # 0 PCP Kwasi Florez DO     REASON FOR EVALUATION: dizziness 7/11/2013   • Intermittent claudication (HCC)    • Neuropathy    • Osteoarthritis    • Peripheral vascular disease (HCC)     stents and bypass right leg, 2/2015   • Proteinuria    • Sleep apnea     CPAP   • Unspecified essential hypertension    • Visual im smokeless tobacco. She reports current alcohol use. She reports current drug use.     ALLERGIES:    Statins                 OTHER (SEE COMMENTS)    Comment:Feet turn black, muscle weakness in the legs             CLASS, feet turned black             Feet tu 100-200 mcg/min, Intravenous, Continuous PRN  ondansetron HCl (ZOFRAN) injection 4 mg, 4 mg, Intravenous, Q6H PRN    Or  Metoclopramide HCl (REGLAN) injection 10 mg, 10 mg, Intravenous, Q8H PRN  famoTIDine (PEPCID) tab 20 mg, 20 mg, Oral, Daily  glucose (D non tender  SKIN: Warm, dry, no rashes    NEUROLOGICAL:   Mental status: Oriented to person, place, and time   Speech: Fluent, no dysarthria  Memory and comprehension: Intact   Cranial Nerves: VFF, PERRL 3mm brisk, EOMI, no nystagmus, facial sensation [place] intracranial portions of the internal carotid arteries and V4 segments of the vertebral arteries. These appear stable relative to prior imaging. Patient with subacute infarcts on CTA, will check MRI Brain, multiple vascular risk factors.   On DAPT,

## 2020-07-08 NOTE — CONSULTS
[facility]  Report of Consultation    Claudene Hails Patient Status:  Observation    1945 MRN [id number]   [facility] 7NE-A Attending Alfonzo Calvo MD   Hosp Day # 0 PCP Carolina Hurtado DO       Assessment / Plan:    1) Metabolic aci CPAP   • Unspecified essential hypertension    • Visual impairment     scar on left retina; glasses     Past Surgical History:   Procedure Laterality Date   • ABLATION      venous   • ANGIOGRAM     • ANGIOPLASTY (CORONARY)  5/2014    RCA, ramus   • ANGIOPL muscle weakness in the legs             CLASS, feet turned black             Feet turn black, muscle weakness in the legs  Warfarin                OTHER (SEE COMMENTS)    Comment:Pt says she developed sores             Oral, coumadin necrosis             P tab 20 mg, 20 mg, Oral, Daily **OR** famoTIDine (PEPCID) injection 20 mg, 20 mg, Intravenous, Daily  •  glucose (DEX4) oral liquid 15 g, 15 g, Oral, Q15 Min PRN **OR** Glucose-Vitamin C (DEX-4) chewable tab 4 tablet, 4 tablet, Oral, Q15 Min PRN **OR** dext Readings from Last 3 Encounters:  07/07/20 : 140 lb (63.5 kg)  03/16/20 : 136 lb (61.7 kg)  01/28/20 : 130 lb (59 kg)    General: awake alert  HEENT: No scleral icterus, MMM  Neck: Supple, no ROMANA or thyromegaly  Cardiac: Regular rate and rhythm, S1, S2 nor

## 2020-07-08 NOTE — PROGRESS NOTES
[facility]  Progress Note     Kinza Barger Patient Status:  Observation    1945 MRN [id number]   [facility] 7NE-A Attending Gaby Kim MD   Hosp Day # 0 PCP Rob Sawyer DO     Chief Complaint: LLE weakness    S: Patient w TROP <0.045            Imaging: Imaging data reviewed in Epic.     Medications:   • Heparin Sodium (Porcine)  5,000 Units Subcutaneous 2 times per day   • famoTIDine  20 mg Oral Daily    Or   • famoTIDine  20 mg Intravenous Daily   • Insulin Aspart Pen  1

## 2020-07-08 NOTE — CONSULTS
[facility]  Report of Consultation    Joshua Donohue Patient Status:  Observation    1945 MRN [id number]   [facility] 7NE-A Attending Alicia Dunbar MD   Hosp Day # 0 PCP Kwasi Florez DO     Reason for Consultation:    The patient (CORONARY)  5/12/15    right popliteal   •      • CABG  2018   • CATARACT     • CATH DRUG ELUTING STENT     • CATH PERCUTANEOUS  TRANSLUMINAL CORONARY ANGIOPLASTY      cardiac and peripheral   • CHOLECYSTECTOMY     • FEMORAL PERIPHERAL BY (SEE COMMENTS)    Comment:Other reaction(s): Headache, HEADACHES  Alfuzosin               DIZZINESS  Gluten Flour            OTHER (SEE COMMENTS)    Comment:Pt feels gluten elevates her blood sugar  Lisinopril              OTHER (SEE COMMENTS)    Comment:H **OR** glucose (DEX4) oral liquid 30 g, 30 g, Oral, Q15 Min PRN **OR** Glucose-Vitamin C (DEX-4) chewable tab 8 tablet, 8 tablet, Oral, Q15 Min PRN  •  Insulin Aspart Pen (NOVOLOG) 100 UNIT/ML flexpen 1-5 Units, 1-5 Units, Subcutaneous, TID CC and HS  •  h wheezes. Cardiovascular: Regular rate and rhythm. Gastrointestinal: Soft, non tender with good bowel sounds. Extremities: No edema. No calf tenderness. Neurological: Grossly intact without focal motor or sensory deficit. Lymphatics:  There is no palpa artery branches. On image 406, there is a short segment occlusion of the distal left P2 segment. There is attenuation of the left P3 segments. There is a mild stenosis at the mid right P1 segment. The basilar artery is patent.  The bilateral vertebral rose cervical arterial vasculature. 3. Mild to moderate mediastinal adenopathy. A follow-up chest CT in 3 months is recommended for further characterization. 4. Mild interstitial edema in the lung apices. Mild left pleural effusion.       Impression and Plan Cardiorenal syndrome     Acute renal failure superimposed on chronic kidney disease, unspecified CKD stage, unspecified acute renal failure type (HCC)     Junctional bradycardia     Urinary retention     Internal jugular vein thrombosis, left ([facility] [address]     Wou

## 2020-07-09 PROBLEM — I63.9 CVA (CEREBRAL VASCULAR ACCIDENT) (HCC): Status: ACTIVE | Noted: 2020-01-01

## 2020-07-09 NOTE — PLAN OF CARE
Assumed patient care at 1900  A/O x4   Neuro assessments Q4  Atrial paced, on TELE  Sodium Bicarb infusing  Mepilex on right forearm; pt said a blood blister opened up- bandage changed   No c/o pain   Ambulates 1 & walker   MRI in AM  Call light within luke needed discharge resources and transportation as appropriate  - Identify discharge learning needs (meds, wound care, etc)  - Arrange for interpreters to assist at discharge as needed  - Consider post-discharge preferences of patient/family/discharge partne measures to prevent increased intracranial pressure  - Maintain blood pressure and fluid volume within ordered parameters to optimize cerebral perfusion and minimize risk of hemorrhage  - Monitor temperature, glucose, and sodium.  Initiate appropriate inter

## 2020-07-09 NOTE — CARDIAC REHAB
Cardiac rehab stroke education completed with patient and . Heart failure education reviewed with patient.     Stroke binder given

## 2020-07-09 NOTE — PROGRESS NOTES
[address]  Neurology Progress Note    Dakota De Luna Charbel Patient Status:  Inpatient    1945 MRN [id number]   [facility] 7NE-A Attending Amari Mijares MD   Hosp Day # 0 PCP Belén De Leon DO     CC: Dizziness    Subjective:   Dakota De Luna [[email or web address]] : Today's findings were discussed with the pt and written pamphlets were provided for vaginal prolapse and urinary incontinence.  After discussing various options of treatments, she was interested in proceeding with UDS.  She will be scheduled for urodynamics and her urine was sent for cx today to rule out infectious cause.\par \par She was also prescribed with estrogen cream for vaginal atrophy\par  chronic infarction in the left corona radiata and left centrum semiovale. 7/7/2020 CTA Brain and Carotids  Extensive intracranial atherosclerosis noted. There are extensive atherosclerotic irregularities within the bilateral M2 segments.   There are mil

## 2020-07-09 NOTE — PROGRESS NOTES
[facility] Pharmacy Note:  Renal Dose Adjustment for Metoclopramide (REGLAN)    [place] Cuff has been prescribed Metoclopramide (REGLAN) 10 mg every 8 hours as needed for n/v.    Estimated Creatinine Clearance: 13.7 mL/min (A) (based on SCr of 2.7 mg/dL (H)).

## 2020-07-09 NOTE — PROGRESS NOTES
[facility]  Progress Note     Pasquale Ron Barger Patient Status:  Observation    1945 MRN [id number]   [facility] 7NE-A Attending Kal Banegas MD   Hosp Day # 0 PCP Mildred Mckeon DO     Chief Complaint: LLE weakness    S: Patient w (H)).    Recent Labs   Lab 07/07/20 2016   PTP 14.5   INR 1.10       Recent Labs   Lab 07/07/20  1319   TROP <0.045            Imaging: Imaging data reviewed in Epic.     Medications:   • Heparin Sodium (Porcine)  5,000 Units Subcutaneous 2 times per day

## 2020-07-09 NOTE — PLAN OF CARE
Assumed care at 0700. Pt A/O x4. RA. NSR on tele. VSS. Neuros q4, see flow sheets. SBP elevated, 200 this morning, PRN hydralazine given which did bring it down within ordered parameters. MRI completed. Neuro notified of critical result.  PT recommending ho

## 2020-07-09 NOTE — PROGRESS NOTES
[facility]  Neurology  Notes  Affiliated with [facility]  2020, 11:36 AM     Erikcorazon Anthony Charbel Patient Status:  Inpatient    1945 MRN [id number]   [facility] 7NE-A Attending Nahum Amaya, [address] [facility] NiCARdipine     • phenylephrine         Objective:  BP (!) 191/61 (BP Location: Right arm)   Pulse 67   Temp 98.7 °F (37.1 °C) (Oral)   Resp 18   Ht 61.5\"   Wt 140 lb (63.5 kg)   SpO2 90%   BMI 26.02 kg/m²      Intake/Output Summary (Last 24 hours) at 7/9 (L) 8.5 - 10.1 mg/dL    Calculated Osmolality 299 (H) 275 - 295 mOsm/kg    GFR, Non- 17 (L) >=60    GFR, -American 19 (L) >=60   CBC W/ DIFFERENTIAL    Collection Time: 07/09/20  5:53 AM   Result Value Ref Range    WBC 23.6 (H) 4.0 - relaxation and increased filling pressure - grade 2     diastolic dysfunction. 2. Ventricular septum: The outflow septum had a sigmoid appearance. 3. Aortic valve: Trileaflet; mildly thickened leaflets.  Transvalvular     velocity was within the normal ra reviewed -2  (   ) Case/studies discussed with other caregivers - 2  (   ) Fam meetings - patient not present --non F2F  Non Face to Face CPT code 30779/01034 applies as documented above    High risk    (   ) Drug monitoring    (   ) PCA    (   ) Organ olive

## 2020-07-09 NOTE — PROGRESS NOTES
[facility]  Nephrology Progress Note    [address]ra [address] 50. Attending:  Zenia Martini MD       Assessment and Plan:    1) Metabolic acidosis- due to CKD 3 / type IV RTA + chronic, daily diarrhea; RTA less likely.  Improving on bicarb gtt- will continue x 24 07/09/2020    CREATSERUM 2.70 07/09/2020    BUN 50 07/09/2020     07/09/2020    K 4.6 07/09/2020     07/09/2020    CO2 23.0 07/09/2020    GLU 99 07/09/2020    CA 8.1 07/09/2020    PGLU 93 07/09/2020       Imaging: All imaging studies reviewed. Oral, Daily  HYDROmorphone HCl (DILAUDID) tab 2 mg, 2 mg, Oral, Q4H PRN  insulin detemir (LEVEMIR) 100 UNIT/ML flextouch 15 Units, 15 Units, Subcutaneous, Nightly  Metoprolol Succinate ER (Toprol XL) 24 hr tab 50 mg, 50 mg, Oral, Daily Beta Blocker

## 2020-07-10 NOTE — PROGRESS NOTES
[facility]  Progress Note     Buck Barger Patient Status:  Observation    1945 MRN [id number]   [facility] 7NE-A Attending Clara Ring MD   Hosp Day # 1 PCP Paulino Marquez DO     Chief Complaint: LLE weakness    S: Patient w --   --   --    TP 6.8 6.2*  --   --   --        Estimated Creatinine Clearance: 10.2 mL/min (A) (based on SCr of 3.62 mg/dL (H)).     Recent Labs   Lab 07/07/20  2016   PTP 14.5   INR 1.10       Recent Labs   Lab 07/07/20  1319   TROP <0.045            Kay

## 2020-07-10 NOTE — PLAN OF CARE
Assumed pt care @ 0730. No new neurological deficit on assessment. Urine sample sent for tom. Verified with Carmela re BP parameter: 120-180. Amlodipine 5mg started by Dr. Chasity Oh, 1st dose given.        Problem: Patient/Family Goals  Goal: Patient/F appropriate  - Consider OT/PT consult to assist with strengthening/mobility  - Encourage toileting schedule  Outcome: Progressing     Problem: DISCHARGE PLANNING  Goal: Discharge to home or other facility with appropriate resources  Description  INTERVENTI to electrolyte replacements, including rhythm and repeat lab results as appropriate  - Fluid restriction as ordered  - Instruct patient on fluid and nutrition restrictions as appropriate  Outcome: Progressing     Problem: NEUROLOGICAL - ADULT  Goal: Aleksander Bianchi wet/gurgly vocal quality is noted   Outcome: Progressing     Problem: Impaired Functional Mobility  Goal: Achieve highest/safest level of mobility/gait  Description  Interventions:  - Assess patient's functional ability and stability  - Promote increasing

## 2020-07-10 NOTE — PLAN OF CARE
Assumed patient care at 1900  A/O x4   Neuro assessments Q4  Atrial paced, on TELE  Sodium Bicarb infusing  Mepilex on right forearm, clean, dry & intact  No c/o pain   BP goal 120-180; IV hydralazine given   New IV placed on Right forearm per pt request limitations  - Instruct pt to call for assistance with activity based on assessment  - Modify environment to reduce risk of injury  - Provide assistive devices as appropriate  - Consider OT/PT consult to assist with strengthening/mobility  - Encourage toil Monitor labs and rhythm and assess patient for signs and symptoms of electrolyte imbalances  - Administer electrolyte replacement as ordered  - Monitor response to electrolyte replacements, including rhythm and repeat lab results as appropriate  - Fluid re time, crush or deliver with applesauce as needed  - Discontinue feeding and notify MD (or speech pathologist) if coughing or persistent throat clearing or wet/gurgly vocal quality is noted   Outcome: Progressing     Problem: Impaired Functional Mobility  G

## 2020-07-10 NOTE — PROGRESS NOTES
[facility]  Nephrology Progress Note    [address]. Attending:  Kelsy Oliva MD       Assessment and Plan:    1) Metabolic acidosis- due to CKD 3 / type IV RTA + chronic, daily diarrhea; RTA less likely.  Improving on bicarb gtt- will continue x 24 winsome  Neurologic: Cranial nerves grossly intact, moving all extremities  Skin: Warm and dry, no rashes       Labs:   Lab Results   Component Value Date    PGLU 114 07/09/2020       Imaging: All imaging studies reviewed.     Meds:   sodium bicarbonate 150 m Bisulfate (PLAVIX) tab 75 mg, 75 mg, Oral, Daily  HYDROmorphone HCl (DILAUDID) tab 2 mg, 2 mg, Oral, Q4H PRN  insulin detemir (LEVEMIR) 100 UNIT/ML flextouch 15 Units, 15 Units, Subcutaneous, Nightly          Questions/concerns were discussed with patient

## 2020-07-10 NOTE — PROGRESS NOTES
Heme/Onc Progress Note - [facility]      Chief Complaint:    Follow up for evaluation and management of neutrophilia and mediastinal lymphadenopathy. Interim History:      The patient was resting comfortably. She has no complaint of pain at this visit.  She ha AST 29 21  --   --   --    ALT 20 18  --   --   --    BILT 0.8 0.6  --   --   --    TP 6.8 6.2*  --   --   --        Radiologic imaging reviewed at this visit:    MRI of brain on 7/9/2020:  FINDINGS:  The ventricles and sulci are within normal limits.   Anastasiya Mehta excluded. Clinical correlation recommended. Impression:     1. Mediastinal lymphadenopathy:  Neutrophilia     She has had borderline lymph nodes seen on the CT scan in 9/2019. Flow cytometry has been sent on peripheral blood.  I would recommend gett

## 2020-07-10 NOTE — PROGRESS NOTES
[id number] [facility] Neurology Progress Note    Casandra Rushford Charbel Patient Status:  Inpatient    1945 MRN [id number]   [facility] 7NE-A Attending Alicia Dunbar MD   [facility] Day # 1 PCP Thierry Stephenson DO       Subjective:   Joshua Donohue is a 70F g, Intravenous, Q24H  melatonin cap/tab 5 mg, 5 mg, Oral, Nightly PRN    Or  melatonin cap/tab 10 mg, 10 mg, Oral, Nightly PRN  acetaminophen (TYLENOL) 650 MG rectal suppository 650 mg, 650 mg, Rectal, Q6H PRN  acetaminophen (TYLENOL) tab 650 mg, 650 mg, O irregularities are seen within the neck.  No high-grade stenosis or occlusion is seen within the major cervical arterial vasculature.   3. Mild to moderate mediastinal adenopathy.  A follow-up chest CT in 3 months is recommended for further characterization DM    Plan:  Ischemic order set  Continue DAPT, ASA 81mg daily and Plavix 75mg daily; she was not taking ASA daily at home due to side effects of bruising and skin-tearing  No statin due to allergy  GI prophylaxis, Pepcid  DVT prophylaxis, heparin  PT/OT/S drift, otherwise, 5/5 strength throughout, tone normal  Sensory: intact to light touch throughout   Coord: FNF intact with no tremor or dysmetria  Romberg: deferred  Gait: deferred    Imaging: no new imaging; prior imaging as noted above    Labs:   BMP: Value Date    ALT 18 07/07/2020    ALT 20 07/07/2020    ALT 15 01/08/2020          Assessment/Plan:    This is a 67year old female with PMHx significant for fibromyalgia, CAD s/p CABG with stents, CHF, DM, HTN, HL, hx DVT, PVD and TASHA, who presented with r

## 2020-07-10 NOTE — SLP NOTE
SPEECH/LANGUAGE/COGNITIVE EVALUATION - INPATIENT    Admission Date: 7/7/2020  Evaluation Date: 07/10/20    Reason for Referral: Stroke protocol    ASSESSMENT & PLAN   ASSESSMENT & IMPRESSION  Patient seen for cognitive communication assessment per stroke p Discussed with RN    Patient, family and/or caregiver has been informed and has taken part in this evaluation and plan of treatment and have been advised and agree on the findings and goals.       FOLLOW UP  Treatment Plan/Recommendations: No further inpati

## 2020-07-11 NOTE — PLAN OF CARE
Assumed care at 1. Patient A&Ox4. Neuros q 4, no deficits. Tele apaced/SR, on room air. Denies pain. Cont to refuse insulin. Loose BM tonight. Per patient chronic loose stools. PRN imodium ordered. BP elevated but w/in parameters.  Given scheduled

## 2020-07-11 NOTE — PROGRESS NOTES
[facility]  Nephrology Progress Note    Andra KINNEY. 50. Attending:  Urban Sr MD       Assessment and Plan:    1) Metabolic acidosis- due to CKD 3 / type IV RTA + chronic, daily diarrhea; RTA less likely.  On bicarb gtt -> transition to PO sodium bic franklin       Labs:   Lab Results   Component Value Date    PTT 41.7 07/10/2020    PTP 13.4 07/10/2020    PGLU 153 07/10/2020       Imaging: All imaging studies reviewed.     Meds:   amLODIPine Besylate (NORVASC) tab 5 mg, 5 mg, Oral, Daily  Loperamide HCl PRN  aspirin chewable tab 81 mg, 81 mg, Oral, Daily  Clopidogrel Bisulfate (PLAVIX) tab 75 mg, 75 mg, Oral, Daily  HYDROmorphone HCl (DILAUDID) tab 2 mg, 2 mg, Oral, Q4H PRN  insulin detemir (LEVEMIR) 100 UNIT/ML flextouch 15 Units, 15 Units, Subcutaneous,

## 2020-07-11 NOTE — PROGRESS NOTES
[facility]  Progress Note     Fuentes Barger Patient Status:  Inpatient    1945 MRN [id number]   [facility] 7NE-A Attending Kelly Urias MD   Hosp Day # 2 PCP Shiela Carroll DO     Chief Complaint: nausea     S: Patient with N/D 22.0 23.0   ALKPHO 197* 173*  --   --   --   --    AST 29 21  --   --   --   --    ALT 20 18  --   --   --   --    BILT 0.8 0.6  --   --   --   --    TP 6.8 6.2*  --   --   --   --     < > = values in this interval not displayed.        Estimated Creatinine Certification    Patient will require inpatient services that will reasonably be expected to span two midnight's based on the clinical documentation in H+P.    Based on patients current state of illness, I anticipate that, after discharge, patient will requ

## 2020-07-11 NOTE — PROGRESS NOTES
[id number] [facility] Neurology Progress Note    Natasha Barger Patient Status:  Inpatient    1945 MRN [id number]   [facility] 7NE-A Attending Dinorah Silveira MD   [facility] Day # 2 PCP Malon Crigler, DO       Subjective:   Jade Munroe is a tablet, Oral, Q15 Min PRN  Insulin Aspart Pen (NOVOLOG) 100 UNIT/ML flexpen 1-5 Units, 1-5 Units, Subcutaneous, TID CC and HS  hydrALAzine HCl (APRESOLINE) injection 10 mg, 10 mg, Intravenous, Q3H PRN  ceFAZolin (ANCEF) IVPB 1g/100ml in 0.9% NaCl minibag/a measuring up to 9 x 5 mm suggestive of an area of acute ischemia/infarction. There are also multiple probable areas of subacute to chronic infarction in the left corona radiata and left centrum semiovale.   Given the involvement of multiple vascular territo bilateral M2 segments.  There are mild short segment stenoses within the bilateral M1 segments.  There is a short segment occlusion within the distal left P2 segment of the posterior cerebral artery.   Mild to moderate atherosclerotic irregularities are see 97.9 °F (36.6 °C) Oral 65 16 96 %   07/11/20 1300 (!) 165/49 98.1 °F (36.7 °C) Oral 63 20 96 %   07/11/20 0900 136/46 97.6 °F (36.4 °C) Oral 60 18 93 %   07/11/20 0500 (!) 164/52 98.3 °F (36.8 °C) Oral 62 17 96 %   07/11/20 0100 (!) 175/51 98.2 °F (36.8 °C 10/21/2019     (H) 07/07/2020       Cholesterol:     Lab Results   Component Value Date    CHOLEST 173 07/07/2020    CHOLEST 250 (H) 01/08/2020    CHOLEST 192 10/21/2019     Lab Results   Component Value Date    HDL 34 (L) 07/07/2020    HDL 41 01/08

## 2020-07-11 NOTE — PROGRESS NOTES
Heme/Onc Progress Note       Chief Complaint:    Follow up for evaluation and management of neutrophilia and mediastinal lymphadenopathy. Interim History:      No new complaints. She complains of bleeding from her heparin injection site. No F/C/NS.   No TP 6.8 6.2*  --   --   --   --     < > = values in this interval not displayed. Radiologic imaging reviewed at this visit:    MRI of brain on 7/9/2020:  FINDINGS:  The ventricles and sulci are within normal limits.   There is no midline shift or ma recommended. Impression:     1. Mediastinal lymphadenopathy:  Neutrophilia     She has had borderline lymph nodes seen on the CT scan in 9/2019. Flow cytometry has been sent on peripheral blood and is pending. Planning an outpatient PET/CT.  We will

## 2020-07-11 NOTE — PLAN OF CARE
Assumed care at 0700. Pt A/O x4. RA. NSR on tele. VSS. Neuros qshift, see flow sheets. Pt complaining of nausea this AM. PRN Zofran and Reglan both given with relief. KUB ordered and completed. HENRIETTA DILLON completed. Pt up x1 with the walker. Pt updated on POC.

## 2020-07-12 NOTE — PROGRESS NOTES
Heme/Onc Progress Note       Chief Complaint:    Follow up for evaluation and management of neutrophilia and mediastinal lymphadenopathy. Interim History:      No new complaints.       Physical Examination:    Vital Signs: /48 (BP Location: Left ar displayed. Radiologic imaging reviewed at this visit:    MRI of brain on 7/9/2020:  FINDINGS:  The ventricles and sulci are within normal limits. There is no midline shift or mass effect. The basal cisterns are patent.   The gray-white matter differ lymphadenopathy:  Neutrophilia     She has had borderline lymph nodes seen on the CT scan in 9/2019. Flow cytometry has been sent on peripheral blood and is pending. Planning an outpatient PET/CT. We will consider other hematology work up at that time.  Rep

## 2020-07-12 NOTE — PROGRESS NOTES
[id number] [facility] Neurology Progress Note    Rod Barger Patient Status:  Inpatient    1945 MRN [id number]   [facility] 7NE-A Attending Jerome Han MD   [facility] Day # 3 PCP Octavio Marie DO       Subjective:   Marley park a 1-5 Units, 1-5 Units, Subcutaneous, TID CC and HS  hydrALAzine HCl (APRESOLINE) injection 10 mg, 10 mg, Intravenous, Q3H PRN  ceFAZolin (ANCEF) IVPB 1g/100ml in 0.9% NaCl minibag/add-van, 1 g, Intravenous, Q24H  melatonin cap/tab 5 mg, 5 mg, Oral, Nightly are also multiple probable areas of subacute to chronic infarction in the left corona radiata and left centrum semiovale.  Given the involvement of multiple vascular territories, the possibility of a central embolic source should be excluded.  Clinical cor bilateral M1 segments.  There is a short segment occlusion within the distal left P2 segment of the posterior cerebral artery.   Mild to moderate atherosclerotic irregularities are seen within the neck.  No high-grade stenosis or occlusion is seen within th hrs:   BP Temp Temp src Pulse Resp SpO2   07/12/20 1623 (!) 169/49 98.6 °F (37 °C) Oral 63 19 92 %   07/12/20 1300 153/49 98.3 °F (36.8 °C) Oral 61 12 94 %   07/12/20 0834 (!) 164/51 98.2 °F (36.8 °C) Oral 60 16 96 %   07/12/20 0557 149/48 98 °F (36.7 °C) 434.0 07/11/2020    .0 07/10/2020    .0 07/09/2020       HGBA1C:    Lab Results   Component Value Date    A1C 7.4 (H) 07/07/2020    A1C 8.3 (H) 01/08/2020    A1C 7.3 (H) 10/21/2019     (H) 07/07/2020       Cholesterol:     Lab Results [phone]  7/12/2020

## 2020-07-12 NOTE — CONSULTS
[facility] Pharmacy Note:  Renal Dose Adjustment    Jade Munroe has been prescribed famotidine (PEPCID) 20 mg intravenously every 12 hours. Estimated Creatinine Clearance: 7.3 mL/min (A) (based on SCr of 5.04 mg/dL (H)).     Calculated creatinine clearance is

## 2020-07-12 NOTE — PLAN OF CARE
Assumed pt care at [address] [place] [address] for the night shift. Pt a/o x 4. Sitting up in the [employer]. Denies any pain or discomfort. Neuro assessment unremarkable. VSS. Hydralazine PRN given X 1 overnight  for SBP > 180 w/ improvement. Remains paced on tele.  O2 sats indicated by assessment.  - Educate pt/family on patient safety including physical limitations  - Instruct pt to call for assistance with activity based on assessment  - Modify environment to reduce risk of injury  - Provide assistive devices as appropriat stability  - Promote increasing activity/tolerance for mobility and gait  - Educate and engage patient/family in tolerated activity level and precautions  Outcome: Progressing

## 2020-07-12 NOTE — PROGRESS NOTES
[facility]  Progress Note     Kristal Jillian Barger Patient Status:  Inpatient    1945 MRN [id number]   [facility] 7NE-A Attending Dania Wagoner MD   [facility] Day # 3 PCP Gi Baca DO     Chief Complaint: nausea     S: Patient could no 103 96* 90*   CO2 19.0* 15.0*   < > 22.0 23.0 25.0   ALKPHO 197* 173*  --   --   --   --    AST 29 21  --   --   --   --    ALT 20 18  --   --   --   --    BILT 0.8 0.6  --   --   --   --    TP 6.8 6.2*  --   --   --   --     < > = values in this interval MD

## 2020-07-12 NOTE — PROGRESS NOTES
[facility]  Nephrology Progress Note    Andra Joyner [address] 50. Attending:  Enedelia Pérez MD       Assessment and Plan:    1) Metabolic acidosis- due to CKD 3 / type IV RTA + chronic, daily diarrhea.  Will start PO sodium bicarbonate and d/c bicarb gtt     2) CK palpable organomegaly  Extremities: Without clubbing, cyanosis; no winsome  Neurologic: Cranial nerves grossly intact, moving all extremities  Skin: Warm and dry, no rashes       Labs:   Lab Results   Component Value Date    CREATSERUM 5.04 07/12/2020    BUN in 0.9% NaCl minibag/add-van, 1 g, Intravenous, Q24H  melatonin cap/tab 5 mg, 5 mg, Oral, Nightly PRN    Or  melatonin cap/tab 10 mg, 10 mg, Oral, Nightly PRN  acetaminophen (TYLENOL) 650 MG rectal suppository 650 mg, 650 mg, Rectal, Q6H PRN  acetaminophen

## 2020-07-12 NOTE — PLAN OF CARE
Assumed care at 0700. Pt A/O x4. RA. NSR on tele. VSS. Neuros qshift, see flow sheets. Pt complaining of nausea this AM. PRN Zofran given with relief. Pt up x1 with the walker. Cardiology consult for possible ED tomorrow. NPO at midnight.  Pt updated on PO

## 2020-07-13 NOTE — PROGRESS NOTES
[facility]EANA HOSPITALIST  Progress Note     Blanquitajanis Barger Patient Status:  Inpatient    1945 MRN [id number]   [facility] 7NE-A Attending Vipul Etienne MD   [facility] Day # 4 PCP Summer Wheeler DO     Chief Complaint: tired    S: Patient without N/ 23.0 25.0 28.0   ALKPHO 197* 173*  --   --   --   --    AST 29 21  --   --   --   --    ALT 20 18  --   --   --   --    BILT 0.8 0.6  --   --   --   --    TP 6.8 6.2*  --   --   --   --     < > = values in this interval not displayed.        Estimated Creat discussed with Patient.  Shari Juarez MD

## 2020-07-13 NOTE — PLAN OF CARE
Assumed care at 0700. No acute issues. NIH 0. Follows all commands. Neuro signed off. No ED per cards. Renal fx improving. IVF dc'd. Once improved to baseline, should dc home. IV abx changed to po for UTI. Vitals stable. Will monitor.

## 2020-07-13 NOTE — HISTORICAL OFFICE NOTE
Progress Notes  - documented in this encounter  Anirudh Eaton MD - 03/22/2019 11:30 AM CDT  Formatting of this note might be different from the original.  3/22/2019    [address] [place] [address] RSFA/popliteal endarterectomy with vein patch   • Other surgical history   hombectomy 2015   • Other surgical history   Cath with stent to RCA-3/07   • Peripheral angiogram   peripheral angiogram, plasty ant tib, popliteal R 7/2015, proximal LAD 10/25/20 (BUMEX) 2 MG tablet Take 2 mg by mouth 2 times daily.    • Probiotic Product (PROBIOTIC PO) 2 daily   • Acetylcysteine (N-ACETYL-L-CYSTEINE) 600 MG Cap 3 daily   • Ascorbic Acid (VITAMIN C) 1000 MG tablet as directed   • clopidogrel (PLAVIX) 75 MG tablet Ta reorder a lower extremity ultrasound to be done over the summer. Otherwise, may continue with her present regimen. I will see her back in about 6 months.     Olman King MD    03/22/19        Electronically signed by Olman King MD at 03/22/

## 2020-07-13 NOTE — CONSULTS
[facility]  Report of Consultation    Manfredantony Salvador Patient Status:  Inpatient    1945 MRN [id number]   [facility] 7NE-A Attending Samuel Campos MD   [facility] Day # 4 PCP Randa Schmitt DO     Reason for Consultation:  Stroke in the CPAP   • Unspecified essential hypertension    • Visual impairment     scar on left retina; glasses     Past Surgical History:   Procedure Laterality Date   • ABLATION      venous   • ANGIOPLASTY (CORONARY)  5/2014    RCA, ramus   • ANGIOPLASTY (CORONAR Feet turn black, muscle weakness in the legs  Warfarin                OTHER (SEE COMMENTS)    Comment:Pt says she developed sores             Oral, coumadin necrosis             Pt says she developed sores             Pt says she developed sores niCARdipine HCl in NaCl (CARDENE) 20 mg/200 ml premix infusion, 5-15 mg/hr, Intravenous, Continuous PRN  •  phenylephrine in NaCl (PATRIZIA-SYNEPHRINE) 50 mg/250 ml premix infusion SOLN, 100-200 mcg/min, Intravenous, Continuous PRN  •  glucose (DEX4) oral liqui distress. HEENT: EOMI, no scleral icterus, mucosa moist  Neck: Supple, carotids 2+   Cardiac: Regular rate and rhythm   Lungs: Clear   Abdomen: Soft, non-tender.    Extremities: Without clubbing, cyanosis or edema  Neurologic: Alert and oriented, normal af

## 2020-07-13 NOTE — PROGRESS NOTES
[id number] Jazzmine Zhang Neurology Progress Note    Evangelista Barger Patient Status:  Inpatient    1945 MRN [id number]   [facility] 7NE-A Attending Wili Vega MD   [facility] Day # 4 PCP Augusto Causey DO         Subjective:   Amber De La Rosa is a(n) in the right external capsule measuring up to 9 x 5 mm suggestive of an area of acute ischemia/infarction.  There are also multiple probable areas of subacute to chronic infarction in the left corona radiata and left centrum semiovale.  Given the involvemen atherosclerotic irregularities within the bilateral M2 segments.  There are mild short segment stenoses within the bilateral M1 segments.  There is a short segment occlusion within the distal left P2 segment of the posterior cerebral artery.   Mild to moder associated with type 2 diabetes mellitus ([address]     PAOD (peripheral arterial occlusive disease) (HCC)     Extremity atherosclerosis with resting pain ([address]     Pulmonary edema cardiac cause ([address]     Demand ischemia (HCC)     Hyponatremia     Dyslipidemia 7.4  Lipids:   P2Y12: 42, consistent with plt inhibition  hypercoag panel pending per Hematology, FII, FV, homocysteine resulted and WNL.     US LUE : chronic left jugular vein noted     Plan:  Ischemic order set  Continue DAPT, ASA 81mg daily and Pl pauly Israel MD   Neurology  [facility]  7/13/2020

## 2020-07-13 NOTE — PLAN OF CARE
A/OX4, RA, A Paced per Tele  Denies pain at this time  NPO since midnight for possible ED  Needs attended to, Will cont to monitor.

## 2020-07-13 NOTE — PLAN OF CARE
Problem: PAIN - ADULT  Goal: Verbalizes/displays adequate comfort level or patient's stated pain goal  Description  INTERVENTIONS:  - Encourage pt to monitor pain and request assistance  - Assess pain using appropriate pain scale  - Administer analgesics and self care  Description  INTERVENTIONS  - Monitor swallowing and airway patency with patient fatigue and changes in neurological status  - Encourage and assist patient to increase activity and self care with guidance from PT/OT  - Encourage visually imp

## 2020-07-13 NOTE — PROGRESS NOTES
[facility]  Nephrology Progress Note    Kristal Barger Patient Status:  Inpatient    1945 MRN [id number]   [facility] 7NE-A Attending Dania Wagoner MD   [facility] Day # 4 PCP Gi Baca DO       SUBJECTIVE:  Pt stable this afternoon, 8. 1* 8.5 7.9* 7.5* 7.6*   MG 3.1*  --   --   --   --   --    * 99 78 106* 125* 122*       Recent Labs   Lab 07/07/20  1319 07/07/20 2016 07/08/20  0531   ALT 20 18  --    AST 29 21  --    ALB 2.6* 2.3*  --    LDH  --   --  465*       Recent Labs Nightly PRN    Or  melatonin cap/tab 10 mg, 10 mg, Oral, Nightly PRN  acetaminophen (TYLENOL) 650 MG rectal suppository 650 mg, 650 mg, Rectal, Q6H PRN  acetaminophen (TYLENOL) tab 650 mg, 650 mg, Oral, Q6H PRN  aspirin chewable tab 81 mg, 81 mg, Oral, Omar Dearth

## 2020-07-13 NOTE — PROGRESS NOTES
Heme/Onc Progress Note - [facility]      Chief Complaint:    Follow up for evaluation and management of neutrophilia and mediastinal lymphadenopathy. Interim History:      The patient was resting comfortably. She has no complaint of pain at this visit.  She ha --   --   --    BILT 0.8 0.6  --   --   --   --    TP 6.8 6.2*  --   --   --   --     < > = values in this interval not displayed.        Radiologic imaging reviewed at this visit:    MRI of brain on 7/9/2020:  FINDINGS:  The ventricles and sulci are within should be excluded. Clinical correlation recommended. Impression:     1. Mediastinal lymphadenopathy:  Neutrophilia     She has had borderline lymph nodes seen on the CT scan in 9/2019. Flow cytometry has been sent on peripheral blood.  I would rajat

## 2020-07-14 NOTE — DIETARY NOTE
Δηληγιάννη Haider Barger     Admitting diagnosis:  Syncope and collapse [R55]  Brain TIA [G45.9]  Urinary tract infection without hematuria, site unspecified [N39.0]    Ht: 156.2 cm (5' 1.5\")  Wt: 63.5 kg (140 lb).  This is 130%

## 2020-07-14 NOTE — PROGRESS NOTES
Assumed care of pt at 2300. A&Ox4. On RA. A-paced on tele. BP maintained within parameters. No neuro deficits. Up with a walker and assist x1 to the bathroom. Denies any urinary symptoms. Receiving abx for UTI. Denies any pain. Needs attended to.  Himanshu germain

## 2020-07-14 NOTE — PROGRESS NOTES
[facility]  Cardiology Progress Note    Pasquale Barger Patient Status:  Inpatient    1945 MRN [id number]   [facility] 7NE-A Attending Kee Clarke MD   [address] Day # 5 PCP Mildred Mckeon DO       Subjective:  Up in chair.  Fausto reed, s Besylate  5 mg Oral Daily   • Metoprolol Succinate ER  75 mg Oral Nightly   • Heparin Sodium (Porcine)  5,000 Units Subcutaneous 2 times per day   • Insulin Aspart Pen  1-5 Units Subcutaneous TID CC and HS   • aspirin  81 mg Oral Daily   • Clopidogrel Bisu systolic pressure has increased  though at a current blood pressure that is 40 mm higher than prior study.     Assessment:  • Acute CVA, bilateral hemispheric infarcts acute/subacute, asa/plavix  o Ongoing hypercoag work up, lupus antigen positive-plans to touched on a PCSK9 inhibitor. She is willing to try this. We will try to get the process started. She will need an outpatient 30-day event monitor. Hopefully, she can be discharged home soon.     Lungs: Clear, CV: Regular rate and rhythm, extremity: No

## 2020-07-14 NOTE — PROGRESS NOTES
[facility]  Nephrology Progress Note    Jeison Barger Patient Status:  Inpatient    1945 MRN [id number]   [facility] 7NE-A Attending Cristine Guzman MD   [address] [facility] Day # 5 PCP Dino Adams DO       SUBJECTIVE:  Feels that thighs are swo 8.0*   < > 8.5 7.9* 7.5* 7.6* 7.7*   MG 3.1*  --   --   --   --   --   --    *   < > 78 106* 125* 122* 98    < > = values in this interval not displayed.        Recent Labs   Lab 07/07/20  1319 07/07/20 2016 07/08/20  0531 07/14/20  0618   ALT 20 18 cap/tab 5 mg, 5 mg, Oral, Nightly PRN    Or  melatonin cap/tab 10 mg, 10 mg, Oral, Nightly PRN  acetaminophen (TYLENOL) 650 MG rectal suppository 650 mg, 650 mg, Rectal, Q6H PRN  acetaminophen (TYLENOL) tab 650 mg, 650 mg, Oral, Q6H PRN  aspirin chewable t

## 2020-07-14 NOTE — PROGRESS NOTES
[facility] HOSPITALIST  Progress Note     Luz Marina Barger Patient Status:  Inpatient    1945 MRN [id number]   [facility] 7NE-A Attending Barbie Campbell MD   [facility] Day # 5 PCP Peace Kauffman DO     Chief Complaint: MONA    S: Patient slept well, --  26   ALT 18  --   --   --  <6*   BILT 0.6  --   --   --  0.5   TP 6.2*  --   --   --  5.1*    < > = values in this interval not displayed. Estimated Creatinine Clearance: 7.7 mL/min (A) (based on SCr of 4.78 mg/dL (H)).     Recent Labs   Lab 07/07 Yogi Mendez MD

## 2020-07-14 NOTE — PLAN OF CARE
Received pt a/ox4, RA A paced on tele at 0700  Pt denies any pain at this time  Neuros Qshift, deficits noted on flowsheet  Changed mepilex dressing on R arm  Lasix given and fluid restriction maintained  Pt updated on plan of care and will continue to mo to reduce risk of injury  - Provide assistive devices as appropriate  - Consider OT/PT consult to assist with strengthening/mobility  - Encourage toileting schedule  Outcome: Progressing     Problem: DISCHARGE PLANNING  Goal: Discharge to home or other fac electrolyte replacement as ordered  - Monitor response to electrolyte replacements, including rhythm and repeat lab results as appropriate  - Fluid restriction as ordered  - Instruct patient on fluid and nutrition restrictions as appropriate  Outcome: Prog pathologist) if coughing or persistent throat clearing or wet/gurgly vocal quality is noted   Outcome: Progressing     Problem: Impaired Functional Mobility  Goal: Achieve highest/safest level of mobility/gait  Description  Interventions:  - Assess patient

## 2020-07-15 NOTE — PLAN OF CARE
NURSING DISCHARGE NOTE    Discharged Home via Wheelchair. Accompanied by Spouse  Belongings Taken by patient/family.     Assumed care of patient at 0700  A/Ox4, RA, Atrial paced on tele  Patient cleared to be discharged from all standpoints  Patient's Rapa pre-medicate as appropriate  Outcome: Adequate for Discharge     Problem: SAFETY ADULT - FALL  Goal: Free from fall injury  Description  INTERVENTIONS:  - Assess pt frequently for physical needs  - Identify cognitive and physical deficits and behaviors kelsey Monitor arterial and/or venous puncture sites for bleeding and/or hematoma  - Assess quality of pulses, skin color and temperature  - Assess for signs of decreased coronary artery perfusion - ex.  Angina  - Evaluate fluid balance, assess for edema, trend we stability  - Promote increasing activity/tolerance for mobility and gait  - Educate and engage patient/family in tolerated activity level and precautions    Outcome: Adequate for Discharge     Problem: Impaired Swallowing  Goal: Minimize aspiration risk  D

## 2020-07-15 NOTE — PROGRESS NOTES
[facility]  Nephrology Progress Note    Honor Jenny Daly Patient Status:  Inpatient    1945 MRN [id number]   [facility] 7NE-A Attending Villa Han MD   [facility] Day # 6 PCP Tatum Catherine DO       SUBJECTIVE:  Feels better today, no ac Recent Labs   Lab 07/14/20  0909 07/14/20  1202 07/14/20  1649 07/14/20  2046 07/15/20  0855   PGLU 103* 106* 117* 131* 99       Meds:   amLODIPine Besylate (NORVASC) tab 5 mg, 5 mg, Oral, BID  Ciprofloxacin HCl (CIPRO) tab 500 mg, 500 mg, Oral, Courtney Daily  HYDROmorphone HCl (DILAUDID) tab 2 mg, 2 mg, Oral, Q4H PRN  insulin detemir (LEVEMIR) 100 UNIT/ML flextouch 15 Units, 15 Units, Subcutaneous, Nightly          Impression/Plan:    #1.   MONA/CKD III- b/l creat 1.5 mg/dl or so due to bx proven diabetic

## 2020-07-15 NOTE — CM/SW NOTE
An[place]ell [place], 07/15/20, 1:40 PM  Called for prior auth for Repatha 140mg injectable (Sure-click) Q 14 days. Called [phone] and spoke to [place]. Pt's ID number is [id number].       Insurance has lot of questions and will fax me a form to be filled

## 2020-07-15 NOTE — DISCHARGE SUMMARY
[facility] HOSPITALIST  DISCHARGE SUMMARY     Emma Barger Patient Status:  Inpatient    1945 MRN [id number]   [facility] 7NE-A Attending No att. providers found   Hosp Day # 6 PCP Sabi Rodrigues DO     Date of Admission: 2020  Date of MG/ML Soaj  Commonly known as:  Repatha SureClick      Inject 1 Syringe into the skin every 14 (fourteen) days.    Stop taking on:  August 14, 2020  Quantity:  2 pen  Refills:  5     sodium bicarbonate 650 MG Tabs      Take 1 tablet (650 mg total) by mouth Where to Get Your Medications      These medications were sent to [facility] [phone], [address] [place] [address], [address] [place] [address]    Phone:  [phone]   · amLODIPine Besylate 5 MG Tabs  · sodium bicarbonate

## 2020-07-15 NOTE — PROGRESS NOTES
Heme/Onc Progress Note - [facility]      Chief Complaint:    Follow up for evaluation and management of neutrophilia and mediastinal lymphadenopathy. Interim History:      The patient was resting comfortably. She has no complaint of pain at this visit.  She ha BILT 0.6  --   --   --  0.5   TP 6.2*  --   --   --  5.1*    < > = values in this interval not displayed. Radiologic imaging reviewed at this visit:    MRI of brain on 7/9/2020:  FINDINGS:  The ventricles and sulci are within normal limits.   There Clinical correlation recommended. Impression:     1. Mediastinal lymphadenopathy:  Neutrophilia     She has had borderline lymph nodes seen on the CT scan in 9/2019. Flow cytometry is normal. JAK2 is pending.  I would recommend getting an outpatient

## 2020-07-15 NOTE — PLAN OF CARE
Patient is alert and oriented. Denies pain or nausea. Patient reports edema to right upper thigh, though none noted upon assessment. Atrial paced on telemetry.   Patient requested to wear purewick overnight to help patient rest. Plan to return home at disch environment to reduce risk of injury  - Provide assistive devices as appropriate  - Consider OT/PT consult to assist with strengthening/mobility  - Encourage toileting schedule  Outcome: Progressing

## 2020-07-15 NOTE — CM/SW NOTE
Kamini Rivera, 07/15/20, 2:29 PM  Faxed form and then spoke to Magdalena Patel. They are processing the prior auth request.  Even though it has been labeled, urgent. It may not be ready until tomorrow. The reference number is [id number]    Brad Jacobson RN.

## 2020-07-16 NOTE — PROGRESS NOTES
Case f/u regarding transportation  Med Nec form still pending from Dr. Gray.  LEO reached out to [facility] for Dr. Chaudhary to sign (late morning this morning).  Awaiting signature for Med Nec form.   LEO spoke with Beverly to make aware awaiting Md signature for Med Nec form.    Beverly indicated pt's appointment needed to be rescheduled.  LEO verbalized understanding.  LEO will fax form to [facility] once signed.  Will also notify Beverly of the same.                        Note from Wed 7/15/20   Case f/u regarding transportation  6:13pm  Around 4pm Spoke with Beverly regarding need for medical necessity form for transportation on tomorrow 7/16 for a 10:30AM eye appointment.  Beverly provided the  number to  --[facility] transportation [phone].  Spoke with several Medicaid representatives,(Sena, Caitlyn, and [place]).  Transportation form obtained, completed,and emailed to  for signature.    Venice informed SW  pt's' transportation is for [employer] transport with [employer].    LEO notified Beverly of the above.  Beverly was unaware that the transport was with [facility]  LEO encouraged Beverly to contact [facility] to confirm arrangements.   Beverly verbalized understanding.  LEO reminded Beverly BAILEY still waiting for Md signature to return Med Nec form. Beverly verbalized understanding.           Called and left vm message for Beverly on/around 3:50PM.              NINA MCCAIN   HPI:   Shawn De La Cruz is a 66year old female who presents for recheck of her diabetes and cellulitis     Pt has had some concerns of right leg cellulitis ; pt has been on keflex  Pt c/o pain  No fever  Pt has seen Dr. Nelia Sparks in the past     Pt was long sta (mg/dL)   Date Value   06/29/2013 136   01/26/2010 92   08/22/2008 57   ----------  AST (SGOT) (IU/L)   Date Value   06/29/2013 22   01/26/2010 22   08/22/2008 31   ----------  AST (U/L)   Date Value   02/01/2017 34   10/17/2016 25   09/26/2016 29   05/05/ diabetes mellitus without mention of complication, not stated as uncontrolled    • Heart disease    • Atherosclerosis of coronary artery    • Heart attack [facility])    • Elevated cholesterol    • Deep vein thrombosis (HCC)      right leg   • Coronary atheroscl shortness of breath  CV: denies chest pain or palpitations  GI: denies abdominal pain, heartburn, chronic diarrhea or constipation  NEURO: denies headaches or dizziness  PSYCH: stable depression or anxiety  No SI or HI   NUTRITION:trying too follow diabeti angina pectoris  cpm     3. Peripheral vascular disease of lower extremity (HCC)  cpm / no current s/s of infection     4. H/O peripheral artery bypass  See cardiology     5.  Essential hypertension  meds being adjusted by cardiology       Repeat labs in 3

## 2020-07-16 NOTE — TELEPHONE ENCOUNTER
Pt discharged 7-15-20, CVA. Pt does not have HFU appt scheduled at this time. NCM attempted to schedule however PCP schedule has no openings. TCM/HFU appt recommended by 7-22-20 as pt is a high risk for readmission.      While on the phone, patient report

## 2020-07-16 NOTE — TELEPHONE ENCOUNTER
Patient sent to ED for eval on 7/7/2020 by this RN. Dr. Mary Jo Yi, please advise on appropriate HFU.  7/21/2020- neurology appt.  7/23/2020-cards at advocate appt.

## 2020-07-16 NOTE — PROGRESS NOTES
Initial Post Discharge Follow Up   Discharge Date: 7/15/20  Contact Date: 7/16/2020    Consent Verification:  Assessment Completed With: Patient  HIPAA Verified?   Yes    Discharge Dx:  CVA (cerebral vascular accident)    Was TCC ordered: no    General: were your discharge instructions explained to you?   o On a scale of 1-5   1- Very Poor and 5- Very well   o Very well  • Do you have any questions about your discharge instructions? No  • Before leaving the hospital was your diagnoses explained to you?  Benjamin Morel acetaminophen 500 MG Oral Tab Take 500 mg by mouth every 6 (six) hours as needed for Pain. • Clopidogrel Bisulfate 75 MG Oral Tab Take 1 tablet (75 mg total) by mouth daily.  30 tablet 11   • Specialty Vitamins Products (INULOSE BLOOD SUGAR SUPPORT) [facility])    Jul 21, 2020  3:15 PM CDT Stroke Follow Up with [place], [facility] Arlin Arguello ([address] [facility])        Mar 02, 2021 12:30 PM CST Established Patient Office Visit with Pancho Dixon MD [facility] ON HFU appt. Patient denies any questions or concerns at this time.       CCM referral placed:  Yes    [x]  Discharge Summary, Discharge medications reviewed/discussed/and reconciled against outpatient medications with patient,  and orders reviewed and discuss

## 2020-07-20 NOTE — TELEPHONE ENCOUNTER
Dr. Saritha Escamilla Pt was released from [facility] on 7/15 due to having a stroke. She had a CT and MRI with dye. She has kidney issues and has retained a large amount of fluid. She has lost about 9 pounds since being home.   She has pain in kidney area and abdo

## 2020-07-20 NOTE — TELEPHONE ENCOUNTER
Pt was released from [facility] on 7/15 due to having a stroke. She had a CT and MRI with dye. She has kidney issues and has retained a large amount of fluid. She has lost about 9 pounds since being home.   She has pain in kidney area and abdomen is still sw

## 2020-07-21 NOTE — TELEPHONE ENCOUNTER
Spoke to pt, states she was gaining wt, but then started Bumex and is down 10 pounds. She was having back pain yesterday. She was able to sit in chair which helped her breathing. Now she is able to sleep on her side.   Her SOB is better as she continues

## 2020-07-22 NOTE — PROGRESS NOTES
HPI:    Diony Beal is a 67year old female here today for hospital follow up.    She was discharged from Inpatient hospital, [facility] to Home   Admission Date: 7/7/20   Discharge Date: 7/15/20  Hospital Discharge Diagnoses (since 6/22/2020) analgesics; tramadol; tylenol with codeine; acetaminophen; cortisone [cortisone acetate]; and naloxone. Current Meds:  GARLIC OR, Take by mouth. Pt thinks she takes 2 daily  Co-Enzyme Q-10 30 MG Oral Cap, Take by mouth.  One tab at night  Evolocumab (REP cholesterol, History of blood transfusion, HTN (hypertension) (7/11/2013), Intermittent claudication ([facility] Neuropathy, Osteoarthritis, Peripheral vascular disease ([facility] Proteinuria, Sleep apnea, Unspecified essential hypertension, and Visual impairment. asthma    PHYSICAL EXAM:   No LMP recorded. Patient is postmenopausal.  Estimated body mass index is 26.02 kg/m² as calculated from the following:    Height as of this encounter: 61.5\". Weight as of this encounter: 140 lb (63.5 kg).    /56   Pulse Future        Orders Placed This Encounter      CBC W Differential W Platelet      Comp Metabolic Panel (14)      Microalb/Creat Ratio, Random Urine      Urine Culture, Routine [E]      Meds & Refills for this Visit:  Requested Prescriptions     Signed Pre

## 2020-07-23 PROBLEM — G45.9 BRAIN TIA: Status: RESOLVED | Noted: 2020-01-01 | Resolved: 2020-01-01

## 2020-07-23 PROBLEM — G45.9 TIA (TRANSIENT ISCHEMIC ATTACK): Status: RESOLVED | Noted: 2020-01-01 | Resolved: 2020-01-01

## 2020-07-23 NOTE — PROGRESS NOTES
Pt had stroke 7/7/20. She has no complaints of issues related to the stroke. She doesn't feel as if she has any deficits.

## 2020-07-23 NOTE — PROGRESS NOTES
[id number] [facility] Patient Status:  No patient class for patient encounter    1945 MRN [id number]   Location [facility], [address], [address] [facility] Attending No att. providers found   Hosp Day # 0 PCP Idris Mahan heart disease [facility])    • Coronary atherosclerosis     stents x8 total   • Deep vein thrombosis (HCC)     right leg   • Diabetes ([facility]    • Fibromyalgia    • Heart attack ([facility] 03/2008    stents x2 at that time.     • High cholesterol    • History of blood t • TONSILLECTOMY        Family History   Problem Relation Age of Onset   • Heart Disorder Father    • Diabetes Mother    • Diabetes Brother    • Heart Disorder Brother       Social History    Tobacco Use      Smoking status: Former Smoker        Packs/day total) by mouth daily. , Disp: 90 tablet, Rfl: 1  •  aspirin 81 MG Oral Tab, Take 81 mg by mouth daily. Does not take daily. Only took prior to angio.  Patient takes omega 3 and tumeric as substitute , Disp: , Rfl:   •  Insulin Pen Needle (BD PEN NEEDLE IZA PAIN    Comment:Other reaction(s): Headache, HEADACHES, Other (See             Comments)  Tylenol With Codeine    OTHER (SEE COMMENTS)    Comment: Oral,             Migraine headache  Acetaminophen           OTHER (SEE COMMENTS)  Cortisone [Cortison* can be an option if there is ever a need for anticoagulation. Discussed s/s of stroke including dysarthria, N/T, focal weakness, vision changes, and instructed to return to ED for new symptoms. Pt and  express understanding.     LOLITA Salvador

## 2020-07-28 NOTE — PROGRESS NOTES
Cancer Center Bone Marrow Procedure Note      Date of Service:  7/28/2020    Problem List:  Patient Active Problem List:     Hypertension     Type 2 diabetes mellitus with diabetic peripheral angiopathy without gangrene ([facility]     Fibromyalgia     Periphera bradycardia     Urinary retention     Internal jugular vein thrombosis, left (HCC)     Wound dehiscence     Urinary tract infection associated with indwelling urethral catheter (HCC)     Nephrotic syndrome     Diabetic nephropathy associated with type 2 di Migraine headache  Acetaminophen           OTHER (SEE COMMENTS)  Cortisone [Cortison*      Naloxone                OTHER (SEE COMMENTS)    Comment:opioid    Medications:   Current Outpatient Medications   Medication Sig Dispense Refill   • amLODIPine Besyl 176/67 (BP Location: Left arm, Patient Position: Sitting, Cuff Size: adult)   Pulse 60   Resp 16   Ht 1.562 m (5' 1.5\")   Wt 64.4 kg (142 lb)   SpO2 96%   BMI 26.40 kg/m²     Procedure Note:     Ashliemiguel ángel Hector presents for a bone marrow biopsy and aspirate.

## 2020-08-10 PROBLEM — I63.9 CVA (CEREBRAL VASCULAR ACCIDENT) (CMD): Status: ACTIVE | Noted: 2020-01-01

## 2020-08-11 NOTE — TELEPHONE ENCOUNTER
Patient called and was discharged from the hospital about 1 month ago. Her legs have been swollen for about 1 month and wants to know what she should do ?

## 2020-08-11 NOTE — TELEPHONE ENCOUNTER
She should increase her bumex to 1 mg twice daily. Also, she needs to be seen in clinic - with myself or Dr. Jaime Napoles in the next week or two. Thanks.

## 2020-08-11 NOTE — TELEPHONE ENCOUNTER
Pt called; pt will take bumex 1 mg bid; elevate legs; wear stockings to help w/ swelling. Plan to f/u in office in 1 week  Asked pt to call if these measures are not helping w/ leg swelling.

## 2020-08-11 NOTE — PROGRESS NOTES
[facility] with Gisela Devine 20  [birth year]  Primary Care Provider:  Augusto Causey DO    8/11/2020  Accompanied visit:     () No.        67year old yo patient being seen for:  Cer Oral Tab, Take 1 tablet (2 mg total) by mouth every 4 (four) hours as needed for Pain., Disp: 20 tablet, Rfl: 0  •  acetaminophen 500 MG Oral Tab, Take 500 mg by mouth every 6 (six) hours as needed for Pain., Disp: , Rfl:   •  Clopidogrel Bisulfate 75 MG O Patient Position: Sitting, Cuff Size: adult)   Pulse 72   Temp 98.2 °F (36.8 °C)   Resp 16   Ht 61.5\"   Wt 142 lb (64.4 kg)   BMI 26.40 kg/m²   Looks stated age  Pink conjunctiva anicteric sclerae, moist mucosa  No LAD, neck supple  Lungs clear breath chris notes    After visit, patient's AVS was given to patient by discharging RN or MA. Pt was escorted out and handed-off discharge process and instructions to the check out desk.   No additional issues raised to staff at this time interval

## 2020-08-31 NOTE — PROGRESS NOTES
Cancer Center Progress Note    Problem List:      Patient Active Problem List:     Hypertension     Type 2 diabetes mellitus with diabetic peripheral angiopathy without gangrene ([facility]     Fibromyalgia     Peripheral vascular disease of lower extremity ([facility] jugular vein thrombosis, left (HCC)     Wound dehiscence     Urinary tract infection associated with indwelling urethral catheter (HCC)     Nephrotic syndrome     Diabetic nephropathy associated with type 2 diabetes mellitus (HCC)     Mediastinal lymphaden significant dysplastic changes noted in the granulocytic or erythroid cell lines. Erythropoiesis is active. There are no abnormal lymphoid infiltrates.   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 Eyes: Negative for visual disturbance, irritation and redness.   Respiratory: Negative 02/20/2018   • CATARACT     • CATH DRUG ELUTING STENT     • CATH PERCUTANEOUS  TRANSLUMINAL CORONARY ANGIOPLASTY      cardiac and peripheral   • CHOLECYSTECTOMY  '95   • FEMORAL PERIPHERAL BYPASS Right 2/10/2015    Performed by Sudeep Allen MD at [facility]             Oral, hyperglycemia             HIGH BLOOD SUGAR  Lyrica [Pregabalin]     DIZZINESS, OTHER (SEE COMMENTS)    Comment:Tremor, dizziness  Opioid Analgesics       PAIN    Comment:Other reaction(s):  Other (See Comments)  Tramadol 1.5\") (08/31 1439)  Weight: 59.9 kg (132 lb) (08/31 1439)  BSA (Calculated - sq m): 1.59 sq meters (08/31 1439)  Pulse: 60 (08/31 1439)  BP: 180/62 (08/31 1439)  Temp: 98.5 °F (36.9 °C) (08/31 1439)  Do Not Use - Resp Rate: --  SpO2: 96 % (08/31 1439) evidence for pulmonary embolism. THORACIC AORTA:  Atheromatous plaque identified in the ascending, transverse and descending thoracic aorta. No evidence for aneurysm. LUNGS: Mild dependent atelectasis is noted.   There is mucous plugging identified in th seen on this exam because of artifact from adjacent pacemaker battery. The patient has a known left IJ thrombus also. 3. There are numerous lymph nodes in the mediastinum.  Although non or grossly enlarged by CT criteria, the number with suggest lymphadeno

## 2020-10-07 PROBLEM — D75.81 MYELOFIBROSIS (HCC): Status: ACTIVE | Noted: 2020-01-01

## 2020-10-07 PROBLEM — N17.9 ACUTE RENAL FAILURE SUPERIMPOSED ON CHRONIC KIDNEY DISEASE, UNSPECIFIED CKD STAGE, UNSPECIFIED ACUTE RENAL FAILURE TYPE: Status: RESOLVED | Noted: 2019-06-19 | Resolved: 2020-01-01

## 2020-10-07 PROBLEM — N18.9 ACUTE RENAL FAILURE SUPERIMPOSED ON CHRONIC KIDNEY DISEASE, UNSPECIFIED CKD STAGE, UNSPECIFIED ACUTE RENAL FAILURE TYPE: Status: RESOLVED | Noted: 2019-06-19 | Resolved: 2020-01-01

## 2020-10-07 PROBLEM — N39.0 URINARY TRACT INFECTION WITHOUT HEMATURIA, SITE UNSPECIFIED: Status: RESOLVED | Noted: 2020-01-01 | Resolved: 2020-01-01

## 2020-10-07 PROBLEM — R74.8 ELEVATED LIVER ENZYMES: Status: RESOLVED | Noted: 2018-02-03 | Resolved: 2020-01-01

## 2020-10-07 PROBLEM — R55 SYNCOPE AND COLLAPSE: Status: RESOLVED | Noted: 2020-01-01 | Resolved: 2020-01-01

## 2020-10-07 PROBLEM — D72.829 LEUKOCYTOSIS, UNSPECIFIED TYPE: Status: RESOLVED | Noted: 2019-06-09 | Resolved: 2020-01-01

## 2020-10-07 PROBLEM — N17.9 ACUTE RENAL FAILURE SUPERIMPOSED ON CHRONIC KIDNEY DISEASE, UNSPECIFIED CKD STAGE, UNSPECIFIED ACUTE RENAL FAILURE TYPE (HCC): Status: RESOLVED | Noted: 2019-06-19 | Resolved: 2020-01-01

## 2020-10-07 PROBLEM — R73.9 HYPERGLYCEMIA: Status: RESOLVED | Noted: 2019-06-19 | Resolved: 2020-01-01

## 2020-10-07 PROBLEM — E87.6 HYPOKALEMIA: Status: RESOLVED | Noted: 2019-06-09 | Resolved: 2020-01-01

## 2020-10-07 PROBLEM — D72.829 LEUKOCYTOSIS: Status: RESOLVED | Noted: 2019-06-19 | Resolved: 2020-01-01

## 2020-10-07 PROBLEM — R00.1 JUNCTIONAL BRADYCARDIA: Status: RESOLVED | Noted: 2019-06-19 | Resolved: 2020-01-01

## 2020-10-07 PROBLEM — I63.9 CVA (CEREBRAL VASCULAR ACCIDENT) (HCC): Status: RESOLVED | Noted: 2020-01-01 | Resolved: 2020-01-01

## 2020-10-07 PROBLEM — N18.9 ACUTE RENAL FAILURE SUPERIMPOSED ON CHRONIC KIDNEY DISEASE, UNSPECIFIED CKD STAGE, UNSPECIFIED ACUTE RENAL FAILURE TYPE (HCC): Status: RESOLVED | Noted: 2019-06-19 | Resolved: 2020-01-01

## 2020-10-07 PROBLEM — R55 SYNCOPE, UNSPECIFIED SYNCOPE TYPE: Status: RESOLVED | Noted: 2019-06-09 | Resolved: 2020-01-01

## 2020-10-07 NOTE — PATIENT INSTRUCTIONS
Anne Barger's SCREENING SCHEDULE   Tests on this list are recommended by your physician but may not be covered, or covered at this frequency, by your insurer. Please check with your insurance carrier before scheduling to verify coverage.    PREVENTATIVE date07/07/2020 Routine EKG is not a screening covered service except at the Welcome to Medicare Visit    Abdominal aortic aneurysm screening (once between ages 73-68) IPPE only No results found for this or any previous visit.  Limited to patients who meet o preventive care reminders to display for this patient. Chlamydia  Annually if high risk No results found for: CHLAMYDIA No flowsheet data found.     Screening Mammogram      Mammogram    Recommend Annually to at least age 76, and as needed after 76 Ther http://www. idph.state. [place]/public/books/advin.htm  A link to the [employer]. This site has a lot of good information including definitions of the different types of Advance Directives.  It also has the State forms available on it's webs

## 2020-10-07 NOTE — PROGRESS NOTES
HPI:   Claudene Hails is a 67year old female who presents for a Medicare Subsequent Annual Wellness visit (Pt already had Initial Annual Wellness).     Pt also here with   Hypertension     Type 2 diabetes mellitus with diabetic peripheral angiopathy withou neutrophilia     Personal history of venous thrombosis and embolism     Myelofibrosis ([facility]      Her last annual assessment has been over 1 year: Annual Physical due on 10/07/2021         Fall/Risk Assessment   She has been screened for Falls and is low ri already takes aspirin and has it on her medication list.   CAGE Alcohol screening   Joshua Donohue was screened for Alcohol abuse and had a score of 0 so is at low risk.     Patient Care Team: Patient Care Team:  Yojana Torres DO as PCP - General (Family Pra disease) [facility])- see eye specialist      Extremity atherosclerosis with resting pain ([address]- see cards      Pulmonary edema cardiac cause ([address]- see cards      Demand ischemia ([address]- see cards      Dyslipidemia- see cards      TASHA on CPAP- cpm      S/P CABG [pregabalin]; opioid analgesics; tramadol; tylenol with codeine; acetaminophen; cortisone [cortisone acetate]; and naloxone. CURRENT MEDICATIONS:     •  hydrALAzine HCl 10 MG Oral Tab, Take 1 tablet (10 mg total) by mouth 4 (four) times daily as needed. history that includes cholecystectomy ('95); remv cataract extracap,insert lens (6/23/10); remv cataract extracap,insert lens (7/14/2010); angioplasty (coronary) (5/2014); angioplasty (coronary) (2007); angioplasty (coronary) (5/2014); angioplasty (coronar Hearing Assessment  (Required for AWV/SWV)    Whispered voice         Visual Acuity  Right Eye Visual Acuity: Corrected Right Eye Chart Acuity: 20/25   Left Eye Visual Acuity: Corrected Left Eye Chart Acuity: 20/60   Both Eyes Visual Acuity: Corrected Both year old female who presents for a Medicare Assessment.      PLAN SUMMARY:   Diagnoses and all orders for this visit:    Encounter for annual health examination    Essential hypertension- consider hydralazine if cards ok   -     OFFICE/OUTPT VISIT,EST,LEVL HEMOGLOBIN A1C; Future    Spinal stenosis of lumbar region, unspecified whether neurogenic claudication present- stable monitor   -     OFFICE/OUTPT VISIT,ESTGRETCHENL III    Spondylolisthesis of lumbar region- stable monitor   -     OFFICE/OUTPT VISIT,EST,LEV mellitus ([facility]- stable monitor   -     OFFICE/OUTPT VISIT,EST,LEVL III    Mediastinal lymphadenopathy- see heme   -     OFFICE/OUTPT VISIT,EST,LEVL III    PVD (peripheral vascular disease) ([facility]- see cards   -     OFFICE/OUTPT VISIT,EST,LEVL III    Cerebro (mg/dL)   Date Value   06/28/2013 139 (H)     LDL Cholesterol (mg/dL)   Date Value   07/07/2020 117 (H)     LDL CHOLESTROL (mg/dL)   Date Value   05/06/2014 92        EKG - w/ Initial Preventative Physical Exam only, or if medically necessary Electrocardio disease   Hemophiliacs who received Factor VIII or IX concentrates   Clients of institutions for the mentally retarded   Persons who live in the same house as a HepB virus carrier   Homosexual men   Illicit injectable drug abusers     Tetanus Toxoid  Only No flowsheet data found. COPD      Spirometry Testing Annually Spirometry date:  No flowsheet data found.             Template: SHIN MU MEDICARE ANNUAL ASSESSMENT FEMALE [43573]

## 2020-10-11 PROBLEM — Z95.0 PACEMAKER: Status: ACTIVE | Noted: 2020-01-01

## 2020-10-15 PROBLEM — Z95.0 PRESENCE OF CARDIAC PACEMAKER: Status: ACTIVE | Noted: 2020-01-01

## 2020-10-15 PROBLEM — I49.5 SINUS NODE DYSFUNCTION (CMD): Status: ACTIVE | Noted: 2020-01-01

## 2020-10-28 PROBLEM — N18.30 CHRONIC RENAL IMPAIRMENT, STAGE 3 (MODERATE) (CMD): Status: ACTIVE | Noted: 2020-01-01

## 2020-11-30 NOTE — PROGRESS NOTES
Cancer Center Progress Note    Problem List:      Patient Active Problem List:     Hypertension     Type 2 diabetes mellitus with diabetic peripheral angiopathy without gangrene ([facility]     Fibromyalgia     Peripheral vascular disease of lower extremity ([facility] Pacemaker      Interim History: Yogi Bateman presents today for evaluation and management of a diagnosis leukocytosis and h/o CVA and h/o DVT in the left internal jugular and proximal subclavian vein. She had a bone marrow biopsy on 7/28/2020.  The ma cells.  There is a mild increase in reticulin fibrosis.       Molecular studies reveal a JAK2 gene, V617F mutation. These findings, together with the morphologic features, are consistent with a chronic myeloproliferative neoplasm.   A prefibrotic phase of appropriate for this visit. The pertinent positives and negatives were described. All other systems were negative.     PMH/PSH:  Past Medical History:   Diagnosis Date   • Arrhythmia    • CAD (coronary artery disease) 7/11/2013   • Cataract     cataract sx PHACOEMULSIFICATION OF CATARACT WITH INTRAOCULAR LENS IMPLANT 74718 Right 7/14/2010    Performed by Preeti Sevilla MD at [address] [place] [address]   • RIGHT PHACOEMULSIFICATION OF CATARACT WITH INTRAOCULAR LENS IMPLANT [id number] Left 6/23/2010    Performed by daily as needed. , Disp: 90 tablet, Rfl: 0    •  METOPROLOL SUCCINATE ER 50 MG Oral Tablet 24 Hr, TAKE 1 TABLET BY MOUTH  DAILY (Patient taking differently: Take 50 mg by mouth 2 (two) times a day.  ), Disp: 90 tablet, Rfl: 3    •  bumetanide 1 MG Oral Tab, is supple. Respiratory: Clear to auscultation and percussion. No rales. No wheezes. Cardiovascular: Regular rate and rhythm. No murmurs. Gastrointestinal: Soft, non tender with good bowel sounds. Musculoskeletal: No edema. No calf tenderness.   Neurolo on image 99 of series 301. MEDIASTINUM:  There are upper normal prevascular and AP window lymph nodes. Upper normal lower left paratracheal lymph node noted. No adenopathy by CT criteria. TERA:  Enlarged right hilar lymph node measures 1.4 x 1.0 cm.  En soft tissues are noted. The findings may be related to the known DVT. Anasarca and cellulitis are also within the differential.  5. Mild bilateral peribronchial thickening and left lower lobe mucous plugging.  Findings may reflect changes related to bronchi

## 2020-12-01 NOTE — TELEPHONE ENCOUNTER
Maegan Vasquez called asking to speak with a nurse before she starts taking her hydroxurea with concerns.  Please call

## 2020-12-09 NOTE — TELEPHONE ENCOUNTER
Patient was informed that Dr Isaias Santo would like her to continue with hydroxyurea and get  a weekly cbc.  Pt verbalizes understanding

## 2021-01-01 ENCOUNTER — TELEPHONE (OUTPATIENT)
Dept: HEMATOLOGY/ONCOLOGY | Facility: HOSPITAL | Age: 76
End: 2021-01-01

## 2021-01-01 ENCOUNTER — HOSPITAL ENCOUNTER (INPATIENT)
Facility: HOSPITAL | Age: 76
LOS: 7 days | Discharge: HOSPICE | DRG: 064 | End: 2021-01-01
Attending: EMERGENCY MEDICINE | Admitting: HOSPITALIST
Payer: MEDICARE

## 2021-01-01 ENCOUNTER — NURSE ONLY (OUTPATIENT)
Dept: HEMATOLOGY/ONCOLOGY | Facility: HOSPITAL | Age: 76
End: 2021-01-01
Attending: INTERNAL MEDICINE
Payer: MEDICARE

## 2021-01-01 ENCOUNTER — APPOINTMENT (OUTPATIENT)
Dept: CT IMAGING | Facility: HOSPITAL | Age: 76
DRG: 064 | End: 2021-01-01
Attending: PHYSICIAN ASSISTANT
Payer: MEDICARE

## 2021-01-01 ENCOUNTER — HOSPITAL ENCOUNTER (EMERGENCY)
Facility: HOSPITAL | Age: 76
Discharge: HOME | End: 2021-01-01
Attending: EMERGENCY MEDICINE
Payer: MEDICARE

## 2021-01-01 ENCOUNTER — ANCILLARY ORDERS (OUTPATIENT)
Dept: CARDIOLOGY | Age: 76
End: 2021-01-01

## 2021-01-01 ENCOUNTER — ANCILLARY PROCEDURE (OUTPATIENT)
Dept: CARDIOLOGY | Age: 76
End: 2021-01-01
Attending: INTERNAL MEDICINE

## 2021-01-01 ENCOUNTER — TELEPHONE (OUTPATIENT)
Dept: CARDIOLOGY | Age: 76
End: 2021-01-01

## 2021-01-01 ENCOUNTER — APPOINTMENT (OUTPATIENT)
Dept: ELECTROPHYSIOLOGY | Facility: HOSPITAL | Age: 76
DRG: 064 | End: 2021-01-01
Attending: Other
Payer: MEDICARE

## 2021-01-01 ENCOUNTER — NURSE ONLY (OUTPATIENT)
Dept: ELECTROPHYSIOLOGY | Facility: HOSPITAL | Age: 76
DRG: 064 | End: 2021-01-01
Attending: NURSE PRACTITIONER
Payer: MEDICARE

## 2021-01-01 ENCOUNTER — TELEPHONE (OUTPATIENT)
Dept: NEUROLOGY | Facility: CLINIC | Age: 76
End: 2021-01-01

## 2021-01-01 ENCOUNTER — HOSPITAL ENCOUNTER (INPATIENT)
Facility: HOSPITAL | Age: 76
LOS: 5 days | DRG: 064 | End: 2021-01-01
Attending: INTERNAL MEDICINE | Admitting: HOSPITALIST
Payer: OTHER MISCELLANEOUS

## 2021-01-01 ENCOUNTER — APPOINTMENT (OUTPATIENT)
Dept: MRI IMAGING | Facility: HOSPITAL | Age: 76
DRG: 064 | End: 2021-01-01
Attending: Other
Payer: MEDICARE

## 2021-01-01 ENCOUNTER — HOSPITAL ENCOUNTER (OUTPATIENT)
Dept: MRI IMAGING | Facility: HOSPITAL | Age: 76
Discharge: HOME | DRG: 064 | End: 2021-01-01
Attending: Other
Payer: MEDICARE

## 2021-01-01 ENCOUNTER — APPOINTMENT (OUTPATIENT)
Dept: GENERAL RADIOLOGY | Facility: HOSPITAL | Age: 76
DRG: 064 | End: 2021-01-01
Attending: INTERNAL MEDICINE
Payer: MEDICARE

## 2021-01-01 ENCOUNTER — APPOINTMENT (OUTPATIENT)
Dept: CV DIAGNOSTICS | Facility: HOSPITAL | Age: 76
DRG: 064 | End: 2021-01-01
Attending: STUDENT IN AN ORGANIZED HEALTH CARE EDUCATION/TRAINING PROGRAM
Payer: MEDICARE

## 2021-01-01 ENCOUNTER — OFFICE VISIT (OUTPATIENT)
Dept: NEUROLOGY | Facility: CLINIC | Age: 76
End: 2021-01-01
Payer: MEDICARE

## 2021-01-01 VITALS
SYSTOLIC BLOOD PRESSURE: 165 MMHG | OXYGEN SATURATION: 98 % | HEIGHT: 61.5 IN | HEART RATE: 59 BPM | BODY MASS INDEX: 23.67 KG/M2 | DIASTOLIC BLOOD PRESSURE: 72 MMHG | WEIGHT: 127 LBS | RESPIRATION RATE: 16 BRPM | TEMPERATURE: 98 F

## 2021-01-01 VITALS
SYSTOLIC BLOOD PRESSURE: 161 MMHG | TEMPERATURE: 98 F | OXYGEN SATURATION: 95 % | DIASTOLIC BLOOD PRESSURE: 56 MMHG | BODY MASS INDEX: 23 KG/M2 | RESPIRATION RATE: 18 BRPM | HEART RATE: 60 BPM | WEIGHT: 125 LBS

## 2021-01-01 VITALS
SYSTOLIC BLOOD PRESSURE: 144 MMHG | WEIGHT: 125 LBS | RESPIRATION RATE: 16 BRPM | DIASTOLIC BLOOD PRESSURE: 70 MMHG | HEART RATE: 76 BPM | HEIGHT: 61.5 IN | BODY MASS INDEX: 23.3 KG/M2

## 2021-01-01 VITALS
OXYGEN SATURATION: 96 % | SYSTOLIC BLOOD PRESSURE: 127 MMHG | HEART RATE: 60 BPM | RESPIRATION RATE: 16 BRPM | DIASTOLIC BLOOD PRESSURE: 72 MMHG

## 2021-01-01 VITALS
OXYGEN SATURATION: 96 % | TEMPERATURE: 101 F | DIASTOLIC BLOOD PRESSURE: 30 MMHG | HEART RATE: 56 BPM | RESPIRATION RATE: 6 BRPM | SYSTOLIC BLOOD PRESSURE: 94 MMHG

## 2021-01-01 VITALS
DIASTOLIC BLOOD PRESSURE: 96 MMHG | BODY MASS INDEX: 23.3 KG/M2 | HEIGHT: 61.5 IN | SYSTOLIC BLOOD PRESSURE: 158 MMHG | OXYGEN SATURATION: 98 % | WEIGHT: 125 LBS | TEMPERATURE: 98 F | HEART RATE: 60 BPM | RESPIRATION RATE: 15 BRPM

## 2021-01-01 VITALS
DIASTOLIC BLOOD PRESSURE: 71 MMHG | BODY MASS INDEX: 24.04 KG/M2 | TEMPERATURE: 97 F | SYSTOLIC BLOOD PRESSURE: 127 MMHG | HEIGHT: 61.5 IN | HEART RATE: 62 BPM | OXYGEN SATURATION: 98 % | WEIGHT: 129 LBS | RESPIRATION RATE: 16 BRPM

## 2021-01-01 VITALS — BODY MASS INDEX: 24 KG/M2 | WEIGHT: 129.38 LBS

## 2021-01-01 VITALS
SYSTOLIC BLOOD PRESSURE: 120 MMHG | BODY MASS INDEX: 23.6 KG/M2 | HEART RATE: 69 BPM | OXYGEN SATURATION: 95 % | DIASTOLIC BLOOD PRESSURE: 42 MMHG | RESPIRATION RATE: 16 BRPM | TEMPERATURE: 99 F | WEIGHT: 125 LBS | HEIGHT: 61 IN

## 2021-01-01 DIAGNOSIS — Z15.89 JAK2 GENE MUTATION: ICD-10-CM

## 2021-01-01 DIAGNOSIS — Z98.890 H/O PERIPHERAL ARTERY BYPASS: ICD-10-CM

## 2021-01-01 DIAGNOSIS — I73.9 PERIPHERAL VASCULAR DISEASE OF LOWER EXTREMITY (HCC): ICD-10-CM

## 2021-01-01 DIAGNOSIS — D45 POLYCYTHEMIA VERA (HCC): ICD-10-CM

## 2021-01-01 DIAGNOSIS — D45 POLYCYTHEMIA VERA (HCC): Primary | ICD-10-CM

## 2021-01-01 DIAGNOSIS — R55 SYNCOPE, UNSPECIFIED SYNCOPE TYPE: ICD-10-CM

## 2021-01-01 DIAGNOSIS — N39.0 URINARY TRACT INFECTION WITHOUT HEMATURIA, SITE UNSPECIFIED: ICD-10-CM

## 2021-01-01 DIAGNOSIS — D47.1 CHRONIC MYELOPROLIFERATIVE DISEASE (HCC): ICD-10-CM

## 2021-01-01 DIAGNOSIS — Z45.018 ENCOUNTER FOR CARE OF PACEMAKER: ICD-10-CM

## 2021-01-01 DIAGNOSIS — R79.89 AZOTEMIA: ICD-10-CM

## 2021-01-01 DIAGNOSIS — D75.81 MYELOFIBROSIS (HCC): ICD-10-CM

## 2021-01-01 DIAGNOSIS — I65.23 STENOSIS OF INTRACRANIAL PORTION OF BOTH INTERNAL CAROTID ARTERIES: ICD-10-CM

## 2021-01-01 DIAGNOSIS — R04.0 EPISTAXIS: Primary | ICD-10-CM

## 2021-01-01 DIAGNOSIS — D68.59 HYPERCOAGULABLE STATE (HCC): ICD-10-CM

## 2021-01-01 DIAGNOSIS — G45.9 TIA (TRANSIENT ISCHEMIC ATTACK): Primary | ICD-10-CM

## 2021-01-01 DIAGNOSIS — Z15.89 JAK2 GENE MUTATION: Primary | ICD-10-CM

## 2021-01-01 DIAGNOSIS — Z95.0 PACEMAKER: ICD-10-CM

## 2021-01-01 DIAGNOSIS — Z15.89 JAK2 V617F MUTATION: ICD-10-CM

## 2021-01-01 DIAGNOSIS — Z23 NEED FOR VACCINATION: ICD-10-CM

## 2021-01-01 DIAGNOSIS — I63.9 ACUTE CVA (CEREBROVASCULAR ACCIDENT) (HCC): Primary | ICD-10-CM

## 2021-01-01 DIAGNOSIS — G45.9 TIA (TRANSIENT ISCHEMIC ATTACK): ICD-10-CM

## 2021-01-01 LAB
ALBUMIN SERPL-MCNC: 3.1 G/DL (ref 3.4–5)
ALBUMIN/GLOB SERPL: 1 {RATIO} (ref 1–2)
ALP LIVER SERPL-CCNC: 229 U/L
ALT SERPL-CCNC: 67 U/L
ANION GAP SERPL CALC-SCNC: 5 MMOL/L (ref 0–18)
ANION GAP SERPL CALC-SCNC: 7 MMOL/L (ref 0–18)
ANION GAP SERPL CALC-SCNC: 7 MMOL/L (ref 0–18)
APTT PPP: 30.3 SECONDS (ref 25.4–36.1)
AST SERPL-CCNC: 31 U/L (ref 15–37)
ATRIAL RATE: 60 BPM
ATRIAL RATE: 64 BPM
ATRIAL RATE: 66 BPM
BASOPHILS # BLD AUTO: 0.02 X10(3) UL (ref 0–0.2)
BASOPHILS # BLD AUTO: 0.08 X10(3) UL (ref 0–0.2)
BASOPHILS # BLD AUTO: 0.09 X10(3) UL (ref 0–0.2)
BASOPHILS NFR BLD AUTO: 0.2 %
BASOPHILS NFR BLD AUTO: 0.2 %
BASOPHILS NFR BLD AUTO: 0.3 %
BASOPHILS NFR BLD AUTO: 0.3 %
BASOPHILS NFR BLD AUTO: 0.7 %
BETA 2 GLYCOPROTEIN 1 AB, IGG: 1.4 U/ML (ref ?–7)
BETA 2 GLYCOPROTEIN 1 AB, IGM: <2.9 U/ML (ref ?–7)
BILIRUB SERPL-MCNC: 0.6 MG/DL (ref 0.1–2)
BILIRUB UR QL STRIP.AUTO: NEGATIVE
BUN BLD-MCNC: 44 MG/DL (ref 7–18)
BUN BLD-MCNC: 53 MG/DL (ref 7–18)
BUN BLD-MCNC: 54 MG/DL (ref 7–18)
BUN/CREAT SERPL: 26.6 (ref 10–20)
BUN/CREAT SERPL: 26.7 (ref 10–20)
BUN/CREAT SERPL: 29 (ref 10–20)
CALCIUM BLD-MCNC: 8.3 MG/DL (ref 8.5–10.1)
CALCIUM BLD-MCNC: 8.5 MG/DL (ref 8.5–10.1)
CALCIUM BLD-MCNC: 8.6 MG/DL (ref 8.5–10.1)
CARDIOLIPIN IGG SERPL-ACNC: 0.8 GPL (ref ?–10)
CARDIOLIPIN IGM SERPL-ACNC: <0.8 MPL (ref ?–10)
CHLORIDE SERPL-SCNC: 105 MMOL/L (ref 98–112)
CHLORIDE SERPL-SCNC: 112 MMOL/L (ref 98–112)
CHLORIDE SERPL-SCNC: 114 MMOL/L (ref 98–112)
CHOLEST SMN-MCNC: 143 MG/DL (ref ?–200)
CO2 SERPL-SCNC: 21 MMOL/L (ref 21–32)
CO2 SERPL-SCNC: 23 MMOL/L (ref 21–32)
CO2 SERPL-SCNC: 25 MMOL/L (ref 21–32)
COLOR UR AUTO: YELLOW
CREAT BLD-MCNC: 1.65 MG/DL
CREAT BLD-MCNC: 1.86 MG/DL
CREAT BLD-MCNC: 1.99 MG/DL
D-DIMER: <0.27 UG/ML FEU (ref ?–0.76)
D-DIMER: <0.27 UG/ML FEU (ref ?–0.76)
DEPRECATED HBV CORE AB SER IA-ACNC: 506 NG/ML
DEPRECATED RDW RBC AUTO: 69.8 FL (ref 35.1–46.3)
DEPRECATED RDW RBC AUTO: 71.5 FL (ref 35.1–46.3)
DEPRECATED RDW RBC AUTO: 71.6 FL (ref 35.1–46.3)
DEPRECATED RDW RBC AUTO: 72.1 FL (ref 35.1–46.3)
DEPRECATED RDW RBC AUTO: 72.2 FL (ref 35.1–46.3)
DEPRECATED RDW RBC AUTO: 73.3 FL (ref 35.1–46.3)
DEPRECATED RDW RBC AUTO: 73.8 FL (ref 35.1–46.3)
DRVVT LUPUS ANTICOAGULANT: NEGATIVE
DRVVT SCREEN RATIO: 0.91 (ref 0–1.29)
EOSINOPHIL # BLD AUTO: 0.1 X10(3) UL (ref 0–0.7)
EOSINOPHIL # BLD AUTO: 0.11 X10(3) UL (ref 0–0.7)
EOSINOPHIL # BLD AUTO: 0.13 X10(3) UL (ref 0–0.7)
EOSINOPHIL # BLD AUTO: 0.22 X10(3) UL (ref 0–0.7)
EOSINOPHIL # BLD AUTO: 0.24 X10(3) UL (ref 0–0.7)
EOSINOPHIL # BLD AUTO: 0.46 X10(3) UL (ref 0–0.7)
EOSINOPHIL # BLD AUTO: 0.47 X10(3) UL (ref 0–0.7)
EOSINOPHIL NFR BLD AUTO: 0.9 %
EOSINOPHIL NFR BLD AUTO: 1.2 %
EOSINOPHIL NFR BLD AUTO: 2.3 %
EOSINOPHIL NFR BLD AUTO: 3.7 %
EOSINOPHIL NFR BLD AUTO: 3.7 %
EOSINOPHIL NFR BLD AUTO: 3.8 %
EOSINOPHIL NFR BLD AUTO: 8 %
ERYTHROCYTE [DISTWIDTH] IN BLOOD BY AUTOMATED COUNT: 19 % (ref 11–15)
ERYTHROCYTE [DISTWIDTH] IN BLOOD BY AUTOMATED COUNT: 19.5 % (ref 11–15)
ERYTHROCYTE [DISTWIDTH] IN BLOOD BY AUTOMATED COUNT: 19.6 % (ref 11–15)
ERYTHROCYTE [DISTWIDTH] IN BLOOD BY AUTOMATED COUNT: 19.9 % (ref 11–15)
ERYTHROCYTE [DISTWIDTH] IN BLOOD BY AUTOMATED COUNT: 20.8 % (ref 11–15)
ERYTHROCYTE [DISTWIDTH] IN BLOOD BY AUTOMATED COUNT: 21.7 % (ref 11–15)
ERYTHROCYTE [DISTWIDTH] IN BLOOD BY AUTOMATED COUNT: 22.1 % (ref 11–15)
GLOBULIN PLAS-MCNC: 3.1 G/DL (ref 2.8–4.4)
GLUCOSE BLD-MCNC: 101 MG/DL (ref 70–99)
GLUCOSE BLD-MCNC: 101 MG/DL (ref 70–99)
GLUCOSE BLD-MCNC: 102 MG/DL (ref 70–99)
GLUCOSE BLD-MCNC: 103 MG/DL (ref 70–99)
GLUCOSE BLD-MCNC: 105 MG/DL (ref 70–99)
GLUCOSE BLD-MCNC: 110 MG/DL (ref 70–99)
GLUCOSE BLD-MCNC: 111 MG/DL (ref 70–99)
GLUCOSE BLD-MCNC: 114 MG/DL (ref 70–99)
GLUCOSE BLD-MCNC: 116 MG/DL (ref 70–99)
GLUCOSE BLD-MCNC: 118 MG/DL (ref 70–99)
GLUCOSE BLD-MCNC: 122 MG/DL (ref 70–99)
GLUCOSE BLD-MCNC: 125 MG/DL (ref 70–99)
GLUCOSE BLD-MCNC: 128 MG/DL (ref 70–99)
GLUCOSE BLD-MCNC: 135 MG/DL (ref 70–99)
GLUCOSE BLD-MCNC: 137 MG/DL (ref 70–99)
GLUCOSE BLD-MCNC: 138 MG/DL (ref 70–99)
GLUCOSE BLD-MCNC: 141 MG/DL (ref 70–99)
GLUCOSE BLD-MCNC: 144 MG/DL (ref 70–99)
GLUCOSE BLD-MCNC: 146 MG/DL (ref 70–99)
GLUCOSE BLD-MCNC: 146 MG/DL (ref 70–99)
GLUCOSE BLD-MCNC: 165 MG/DL (ref 70–99)
GLUCOSE BLD-MCNC: 174 MG/DL (ref 70–99)
GLUCOSE BLD-MCNC: 218 MG/DL (ref 70–99)
GLUCOSE BLD-MCNC: 74 MG/DL (ref 70–99)
GLUCOSE BLD-MCNC: 78 MG/DL (ref 70–99)
GLUCOSE BLD-MCNC: 80 MG/DL (ref 70–99)
GLUCOSE BLD-MCNC: 92 MG/DL (ref 70–99)
GLUCOSE BLD-MCNC: 96 MG/DL (ref 70–99)
GLUCOSE BLD-MCNC: 99 MG/DL (ref 70–99)
GLUCOSE BLD-MCNC: 99 MG/DL (ref 70–99)
GLUCOSE UR STRIP.AUTO-MCNC: 150 MG/DL
HAV IGM SER QL: 3.2 MG/DL (ref 1.6–2.6)
HCT VFR BLD AUTO: 25 %
HCT VFR BLD AUTO: 25.6 %
HCT VFR BLD AUTO: 29.5 %
HCT VFR BLD AUTO: 35.6 %
HCT VFR BLD AUTO: 40.9 %
HCT VFR BLD AUTO: 59 %
HCT VFR BLD AUTO: 60.2 %
HDLC SERPL-MCNC: 52 MG/DL (ref 40–59)
HGB BLD-MCNC: 10.9 G/DL
HGB BLD-MCNC: 12.7 G/DL
HGB BLD-MCNC: 17.6 G/DL
HGB BLD-MCNC: 18.2 G/DL
HGB BLD-MCNC: 7.9 G/DL
HGB BLD-MCNC: 8.1 G/DL
HGB BLD-MCNC: 9.3 G/DL
IMM GRANULOCYTES # BLD AUTO: 0.01 X10(3) UL (ref 0–1)
IMM GRANULOCYTES # BLD AUTO: 0.03 X10(3) UL (ref 0–1)
IMM GRANULOCYTES # BLD AUTO: 0.04 X10(3) UL (ref 0–1)
IMM GRANULOCYTES # BLD AUTO: 0.08 X10(3) UL (ref 0–1)
IMM GRANULOCYTES # BLD AUTO: 0.09 X10(3) UL (ref 0–1)
IMM GRANULOCYTES # BLD AUTO: 0.09 X10(3) UL (ref 0–1)
IMM GRANULOCYTES # BLD AUTO: 0.26 X10(3) UL (ref 0–1)
IMM GRANULOCYTES NFR BLD: 0.3 %
IMM GRANULOCYTES NFR BLD: 0.5 %
IMM GRANULOCYTES NFR BLD: 0.7 %
IMM GRANULOCYTES NFR BLD: 0.9 %
IMM GRANULOCYTES NFR BLD: 2.2 %
IRON SATURATION: 40 %
IRON SERPL-MCNC: 91 UG/DL
KETONES UR STRIP.AUTO-MCNC: NEGATIVE MG/DL
LDH SERPL L TO P-CCNC: 270 U/L
LDLC SERPL CALC-MCNC: 56 MG/DL (ref ?–100)
LYMPHOCYTES # BLD AUTO: 0.46 X10(3) UL (ref 1–4)
LYMPHOCYTES # BLD AUTO: 0.54 X10(3) UL (ref 1–4)
LYMPHOCYTES # BLD AUTO: 0.62 X10(3) UL (ref 1–4)
LYMPHOCYTES # BLD AUTO: 0.67 X10(3) UL (ref 1–4)
LYMPHOCYTES # BLD AUTO: 0.68 X10(3) UL (ref 1–4)
LYMPHOCYTES # BLD AUTO: 0.72 X10(3) UL (ref 1–4)
LYMPHOCYTES # BLD AUTO: 0.74 X10(3) UL (ref 1–4)
LYMPHOCYTES NFR BLD AUTO: 10.4 %
LYMPHOCYTES NFR BLD AUTO: 12.8 %
LYMPHOCYTES NFR BLD AUTO: 24 %
LYMPHOCYTES NFR BLD AUTO: 4.6 %
LYMPHOCYTES NFR BLD AUTO: 5 %
LYMPHOCYTES NFR BLD AUTO: 5.4 %
LYMPHOCYTES NFR BLD AUTO: 5.5 %
M PROTEIN MFR SERPL ELPH: 6.2 G/DL (ref 6.4–8.2)
MCH RBC QN AUTO: 28.9 PG (ref 26–34)
MCH RBC QN AUTO: 29.1 PG (ref 26–34)
MCH RBC QN AUTO: 30.3 PG (ref 26–34)
MCH RBC QN AUTO: 30.4 PG (ref 26–34)
MCH RBC QN AUTO: 31.4 PG (ref 26–34)
MCH RBC QN AUTO: 31.8 PG (ref 26–34)
MCH RBC QN AUTO: 31.9 PG (ref 26–34)
MCHC RBC AUTO-ENTMCNC: 29.8 G/DL (ref 31–37)
MCHC RBC AUTO-ENTMCNC: 30.2 G/DL (ref 31–37)
MCHC RBC AUTO-ENTMCNC: 30.6 G/DL (ref 31–37)
MCHC RBC AUTO-ENTMCNC: 31.1 G/DL (ref 31–37)
MCHC RBC AUTO-ENTMCNC: 31.5 G/DL (ref 31–37)
MCHC RBC AUTO-ENTMCNC: 31.6 G/DL (ref 31–37)
MCHC RBC AUTO-ENTMCNC: 31.6 G/DL (ref 31–37)
MCV RBC AUTO: 100.4 FL
MCV RBC AUTO: 100.8 FL
MCV RBC AUTO: 96.3 FL
MCV RBC AUTO: 96.9 FL
MCV RBC AUTO: 97.6 FL
MCV RBC AUTO: 99.2 FL
MCV RBC AUTO: 99.7 FL
MONOCYTES # BLD AUTO: 0.07 X10(3) UL (ref 0.1–1)
MONOCYTES # BLD AUTO: 0.1 X10(3) UL (ref 0.1–1)
MONOCYTES # BLD AUTO: 0.11 X10(3) UL (ref 0.1–1)
MONOCYTES # BLD AUTO: 0.14 X10(3) UL (ref 0.1–1)
MONOCYTES # BLD AUTO: 0.14 X10(3) UL (ref 0.1–1)
MONOCYTES # BLD AUTO: 0.16 X10(3) UL (ref 0.1–1)
MONOCYTES # BLD AUTO: 0.55 X10(3) UL (ref 0.1–1)
MONOCYTES NFR BLD AUTO: 1.2 %
MONOCYTES NFR BLD AUTO: 1.3 %
MONOCYTES NFR BLD AUTO: 1.5 %
MONOCYTES NFR BLD AUTO: 1.7 %
MONOCYTES NFR BLD AUTO: 1.9 %
MONOCYTES NFR BLD AUTO: 2.3 %
MONOCYTES NFR BLD AUTO: 4.7 %
NEUTROPHILS # BLD AUTO: 1.94 X10 (3) UL (ref 1.5–7.7)
NEUTROPHILS # BLD AUTO: 1.94 X10(3) UL (ref 1.5–7.7)
NEUTROPHILS # BLD AUTO: 10.29 X10 (3) UL (ref 1.5–7.7)
NEUTROPHILS # BLD AUTO: 10.29 X10(3) UL (ref 1.5–7.7)
NEUTROPHILS # BLD AUTO: 10.73 X10 (3) UL (ref 1.5–7.7)
NEUTROPHILS # BLD AUTO: 10.73 X10(3) UL (ref 1.5–7.7)
NEUTROPHILS # BLD AUTO: 11.21 X10 (3) UL (ref 1.5–7.7)
NEUTROPHILS # BLD AUTO: 11.21 X10(3) UL (ref 1.5–7.7)
NEUTROPHILS # BLD AUTO: 4.75 X10 (3) UL (ref 1.5–7.7)
NEUTROPHILS # BLD AUTO: 4.75 X10(3) UL (ref 1.5–7.7)
NEUTROPHILS # BLD AUTO: 4.93 X10 (3) UL (ref 1.5–7.7)
NEUTROPHILS # BLD AUTO: 4.93 X10(3) UL (ref 1.5–7.7)
NEUTROPHILS # BLD AUTO: 8.43 X10 (3) UL (ref 1.5–7.7)
NEUTROPHILS # BLD AUTO: 8.43 X10(3) UL (ref 1.5–7.7)
NEUTROPHILS NFR BLD AUTO: 64.7 %
NEUTROPHILS NFR BLD AUTO: 82.4 %
NEUTROPHILS NFR BLD AUTO: 83 %
NEUTROPHILS NFR BLD AUTO: 87.4 %
NEUTROPHILS NFR BLD AUTO: 88.1 %
NEUTROPHILS NFR BLD AUTO: 88.2 %
NEUTROPHILS NFR BLD AUTO: 91.2 %
NITRITE UR QL STRIP.AUTO: POSITIVE
NONHDLC SERPL-MCNC: 91 MG/DL (ref ?–130)
OSMOLALITY SERPL CALC.SUM OF ELEC: 299 MOSM/KG (ref 275–295)
OSMOLALITY SERPL CALC.SUM OF ELEC: 306 MOSM/KG (ref 275–295)
OSMOLALITY SERPL CALC.SUM OF ELEC: 309 MOSM/KG (ref 275–295)
P AXIS: 103 DEGREES
P AXIS: 47 DEGREES
P AXIS: 75 DEGREES
P-R INTERVAL: 204 MS
P-R INTERVAL: 212 MS
P-R INTERVAL: 226 MS
PH UR STRIP.AUTO: 6 [PH] (ref 5–8)
PHOSPHATE SERPL-MCNC: 3.7 MG/DL (ref 2.5–4.9)
PLATELET # BLD AUTO: 101 10(3)UL (ref 150–450)
PLATELET # BLD AUTO: 146 10(3)UL (ref 150–450)
PLATELET # BLD AUTO: 177 10(3)UL (ref 150–450)
PLATELET # BLD AUTO: 180 10(3)UL (ref 150–450)
PLATELET # BLD AUTO: 186 10(3)UL (ref 150–450)
PLATELET # BLD AUTO: 188 10(3)UL (ref 150–450)
PLATELET # BLD AUTO: 209 10(3)UL (ref 150–450)
PLATELET MORPHOLOGY: NORMAL
POTASSIUM SERPL-SCNC: 4.5 MMOL/L (ref 3.5–5.1)
POTASSIUM SERPL-SCNC: 4.8 MMOL/L (ref 3.5–5.1)
POTASSIUM SERPL-SCNC: 5.1 MMOL/L (ref 3.5–5.1)
PROCALCITONIN SERPL-MCNC: 0.86 NG/ML (ref ?–0.16)
PROT UR STRIP.AUTO-MCNC: 100 MG/DL
PT(LUPUS): 13.9 SECONDS (ref 12.1–14.7)
Q-T INTERVAL: 440 MS
Q-T INTERVAL: 442 MS
Q-T INTERVAL: 446 MS
QRS DURATION: 112 MS
QRS DURATION: 112 MS
QRS DURATION: 114 MS
QTC CALCULATION (BEZET): 442 MS
QTC CALCULATION (BEZET): 460 MS
QTC CALCULATION (BEZET): 461 MS
R AXIS: -58 DEGREES
R AXIS: -58 DEGREES
R AXIS: -65 DEGREES
RBC # BLD AUTO: 2.48 X10(6)UL
RBC # BLD AUTO: 2.55 X10(6)UL
RBC # BLD AUTO: 2.96 X10(6)UL
RBC # BLD AUTO: 3.59 X10(6)UL
RBC # BLD AUTO: 4.19 X10(6)UL
RBC # BLD AUTO: 6.09 X10(6)UL
RBC # BLD AUTO: 6.25 X10(6)UL
RBC UR QL AUTO: NEGATIVE
SARS-COV-2 RNA RESP QL NAA+PROBE: NOT DETECTED
SODIUM SERPL-SCNC: 135 MMOL/L (ref 136–145)
SODIUM SERPL-SCNC: 142 MMOL/L (ref 136–145)
SODIUM SERPL-SCNC: 142 MMOL/L (ref 136–145)
SP GR UR STRIP.AUTO: 1.01 (ref 1–1.03)
STACLOT LA DELTA: 0 SECONDS (ref ?–8)
STACLOT LA: NEGATIVE
T AXIS: 74 DEGREES
T AXIS: 84 DEGREES
T AXIS: 86 DEGREES
TOTAL IRON BINDING CAPACITY: 229 UG/DL (ref 240–450)
TRANSFERRIN SERPL-MCNC: 154 MG/DL (ref 200–360)
TRIGL SERPL-MCNC: 174 MG/DL (ref 30–149)
TROPONIN I SERPL-MCNC: 0.8 NG/ML (ref ?–0.04)
TROPONIN I SERPL-MCNC: 2.75 NG/ML (ref ?–0.04)
UROBILINOGEN UR STRIP.AUTO-MCNC: <2 MG/DL
VENTRICULAR RATE: 60 BPM
VENTRICULAR RATE: 64 BPM
VENTRICULAR RATE: 66 BPM
VIT B12 SERPL-MCNC: >2000 PG/ML (ref 193–986)
VLDLC SERPL CALC-MCNC: 35 MG/DL (ref 0–30)
WBC # BLD AUTO: 11.8 X10(3) UL (ref 4–11)
WBC # BLD AUTO: 12.2 X10(3) UL (ref 4–11)
WBC # BLD AUTO: 12.7 X10(3) UL (ref 4–11)
WBC # BLD AUTO: 3 X10(3) UL (ref 4–11)
WBC # BLD AUTO: 5.8 X10(3) UL (ref 4–11)
WBC # BLD AUTO: 5.9 X10(3) UL (ref 4–11)
WBC # BLD AUTO: 9.2 X10(3) UL (ref 4–11)
WBC #/AREA URNS AUTO: >50 /HPF
WBC CLUMPS UR QL AUTO: PRESENT /HPF

## 2021-01-01 PROCEDURE — 70551 MRI BRAIN STEM W/O DYE: CPT | Performed by: OTHER

## 2021-01-01 PROCEDURE — 99232 SBSQ HOSP IP/OBS MODERATE 35: CPT | Performed by: INTERNAL MEDICINE

## 2021-01-01 PROCEDURE — 36415 COLL VENOUS BLD VENIPUNCTURE: CPT

## 2021-01-01 PROCEDURE — 99233 SBSQ HOSP IP/OBS HIGH 50: CPT | Performed by: INTERNAL MEDICINE

## 2021-01-01 PROCEDURE — 70547 MR ANGIOGRAPHY NECK W/O DYE: CPT | Performed by: OTHER

## 2021-01-01 PROCEDURE — 99233 SBSQ HOSP IP/OBS HIGH 50: CPT | Performed by: OTHER

## 2021-01-01 PROCEDURE — 99223 1ST HOSP IP/OBS HIGH 75: CPT | Performed by: OTHER

## 2021-01-01 PROCEDURE — 30903 CONTROL OF NOSEBLEED: CPT

## 2021-01-01 PROCEDURE — 70544 MR ANGIOGRAPHY HEAD W/O DYE: CPT | Performed by: OTHER

## 2021-01-01 PROCEDURE — 93306 TTE W/DOPPLER COMPLETE: CPT | Performed by: STUDENT IN AN ORGANIZED HEALTH CARE EDUCATION/TRAINING PROGRAM

## 2021-01-01 PROCEDURE — 71045 X-RAY EXAM CHEST 1 VIEW: CPT | Performed by: INTERNAL MEDICINE

## 2021-01-01 PROCEDURE — 99195 PHLEBOTOMY: CPT

## 2021-01-01 PROCEDURE — 85025 COMPLETE CBC W/AUTO DIFF WBC: CPT

## 2021-01-01 PROCEDURE — 85025 COMPLETE CBC W/AUTO DIFF WBC: CPT | Performed by: EMERGENCY MEDICINE

## 2021-01-01 PROCEDURE — 99214 OFFICE O/P EST MOD 30 MIN: CPT | Performed by: OTHER

## 2021-01-01 PROCEDURE — X1114 CARDIAC DEVICE HOME CHECK - REMOTE UNSCHEDULED: HCPCS | Performed by: INTERNAL MEDICINE

## 2021-01-01 PROCEDURE — 99232 SBSQ HOSP IP/OBS MODERATE 35: CPT | Performed by: OTHER

## 2021-01-01 PROCEDURE — 99281 EMR DPT VST MAYX REQ PHY/QHP: CPT

## 2021-01-01 PROCEDURE — 99223 1ST HOSP IP/OBS HIGH 75: CPT | Performed by: INTERNAL MEDICINE

## 2021-01-01 PROCEDURE — 93294 REM INTERROG EVL PM/LDLS PM: CPT | Performed by: INTERNAL MEDICINE

## 2021-01-01 PROCEDURE — 99233 SBSQ HOSP IP/OBS HIGH 50: CPT | Performed by: NURSE PRACTITIONER

## 2021-01-01 PROCEDURE — 99232 SBSQ HOSP IP/OBS MODERATE 35: CPT | Performed by: HOSPITALIST

## 2021-01-01 PROCEDURE — 99283 EMERGENCY DEPT VISIT LOW MDM: CPT

## 2021-01-01 PROCEDURE — 99231 SBSQ HOSP IP/OBS SF/LOW 25: CPT | Performed by: PHYSICIAN ASSISTANT

## 2021-01-01 PROCEDURE — 99232 SBSQ HOSP IP/OBS MODERATE 35: CPT | Performed by: NURSE PRACTITIONER

## 2021-01-01 PROCEDURE — 99284 EMERGENCY DEPT VISIT MOD MDM: CPT

## 2021-01-01 PROCEDURE — 70450 CT HEAD/BRAIN W/O DYE: CPT | Performed by: PHYSICIAN ASSISTANT

## 2021-01-01 PROCEDURE — 99222 1ST HOSP IP/OBS MODERATE 55: CPT | Performed by: STUDENT IN AN ORGANIZED HEALTH CARE EDUCATION/TRAINING PROGRAM

## 2021-01-01 PROCEDURE — 95819 EEG AWAKE AND ASLEEP: CPT | Performed by: OTHER

## 2021-01-01 PROCEDURE — 99213 OFFICE O/P EST LOW 20 MIN: CPT | Performed by: CLINICAL NURSE SPECIALIST

## 2021-01-01 RX ORDER — SODIUM CHLORIDE 9 MG/ML
INJECTION, SOLUTION INTRAVENOUS CONTINUOUS
Status: ACTIVE | OUTPATIENT
Start: 2021-01-01 | End: 2021-01-01

## 2021-01-01 RX ORDER — METOPROLOL SUCCINATE 50 MG/1
50 TABLET, EXTENDED RELEASE ORAL 2 TIMES DAILY
Status: DISCONTINUED | OUTPATIENT
Start: 2021-01-01 | End: 2021-01-01

## 2021-01-01 RX ORDER — CIPROFLOXACIN 500 MG/1
500 TABLET, FILM COATED ORAL 2 TIMES DAILY
Qty: 6 TABLET | Refills: 0 | Status: SHIPPED | OUTPATIENT
Start: 2021-01-01 | End: 2021-01-01

## 2021-01-01 RX ORDER — ACETAMINOPHEN 160 MG/5ML
650 SOLUTION ORAL EVERY 4 HOURS PRN
Status: DISCONTINUED | OUTPATIENT
Start: 2021-01-01 | End: 2021-01-01

## 2021-01-01 RX ORDER — TRANEXAMIC ACID 100 MG/ML
5 INJECTION, SOLUTION INTRAVENOUS ONCE
Status: COMPLETED | OUTPATIENT
Start: 2021-01-01 | End: 2021-01-01

## 2021-01-01 RX ORDER — BISACODYL 10 MG
10 SUPPOSITORY, RECTAL RECTAL
Status: DISCONTINUED | OUTPATIENT
Start: 2021-01-01 | End: 2021-01-01

## 2021-01-01 RX ORDER — ASPIRIN 300 MG
300 SUPPOSITORY, RECTAL RECTAL DAILY
Status: DISCONTINUED | OUTPATIENT
Start: 2021-01-01 | End: 2021-01-01

## 2021-01-01 RX ORDER — GLYCOPYRROLATE 0.2 MG/ML
0.4 INJECTION, SOLUTION INTRAMUSCULAR; INTRAVENOUS
Status: DISCONTINUED | OUTPATIENT
Start: 2021-01-01 | End: 2021-01-01

## 2021-01-01 RX ORDER — METOPROLOL SUCCINATE 50 MG/1
50 TABLET, EXTENDED RELEASE ORAL 2 TIMES DAILY
Qty: 180 TABLET | Refills: 1 | Status: SHIPPED | OUTPATIENT
Start: 2021-01-01

## 2021-01-01 RX ORDER — ASPIRIN 325 MG
325 TABLET ORAL ONCE
Status: COMPLETED | OUTPATIENT
Start: 2021-01-01 | End: 2021-01-01

## 2021-01-01 RX ORDER — HYDRALAZINE HYDROCHLORIDE 20 MG/ML
10 INJECTION INTRAMUSCULAR; INTRAVENOUS EVERY 2 HOUR PRN
Status: DISCONTINUED | OUTPATIENT
Start: 2021-01-01 | End: 2021-01-01

## 2021-01-01 RX ORDER — METOCLOPRAMIDE HYDROCHLORIDE 5 MG/ML
10 INJECTION INTRAMUSCULAR; INTRAVENOUS EVERY 8 HOURS PRN
Status: DISCONTINUED | OUTPATIENT
Start: 2021-01-01 | End: 2021-01-01

## 2021-01-01 RX ORDER — FENTANYL 12 UG/H
1 PATCH TRANSDERMAL
Status: DISCONTINUED | OUTPATIENT
Start: 2021-01-01 | End: 2021-01-01

## 2021-01-01 RX ORDER — LORAZEPAM 2 MG/ML
0.5 INJECTION INTRAMUSCULAR EVERY 4 HOURS PRN
Status: DISCONTINUED | OUTPATIENT
Start: 2021-01-01 | End: 2021-01-01

## 2021-01-01 RX ORDER — MORPHINE SULFATE 2 MG/ML
1 INJECTION, SOLUTION INTRAMUSCULAR; INTRAVENOUS
Status: DISCONTINUED | OUTPATIENT
Start: 2021-01-01 | End: 2021-01-01

## 2021-01-01 RX ORDER — MORPHINE SULFATE 2 MG/ML
2 INJECTION, SOLUTION INTRAMUSCULAR; INTRAVENOUS EVERY 2 HOUR PRN
Status: DISCONTINUED | OUTPATIENT
Start: 2021-01-01 | End: 2021-01-01

## 2021-01-01 RX ORDER — ASPIRIN 81 MG/1
81 TABLET ORAL DAILY
Status: DISCONTINUED | OUTPATIENT
Start: 2021-01-01 | End: 2021-01-01

## 2021-01-01 RX ORDER — LORAZEPAM 1 MG/1
1 TABLET ORAL ONCE
Status: COMPLETED | OUTPATIENT
Start: 2021-01-01 | End: 2021-01-01

## 2021-01-01 RX ORDER — METOPROLOL SUCCINATE 50 MG/1
50 TABLET, EXTENDED RELEASE ORAL 2 TIMES DAILY
Status: ON HOLD | COMMUNITY
End: 2021-01-01

## 2021-01-01 RX ORDER — GABAPENTIN 100 MG/1
100 CAPSULE ORAL 3 TIMES DAILY
OUTPATIENT
Start: 2021-01-01

## 2021-01-01 RX ORDER — CIPROFLOXACIN 500 MG/1
500 TABLET, FILM COATED ORAL DAILY
Qty: 3 TABLET | Refills: 0 | Status: SHIPPED | OUTPATIENT
Start: 2021-01-01 | End: 2021-01-01

## 2021-01-01 RX ORDER — AMLODIPINE BESYLATE 5 MG/1
5 TABLET ORAL DAILY
Status: ON HOLD | COMMUNITY
End: 2021-01-01

## 2021-01-01 RX ORDER — LABETALOL HYDROCHLORIDE 5 MG/ML
10 INJECTION, SOLUTION INTRAVENOUS EVERY 10 MIN PRN
Status: DISCONTINUED | OUTPATIENT
Start: 2021-01-01 | End: 2021-01-01

## 2021-01-01 RX ORDER — HALOPERIDOL 5 MG/ML
1 INJECTION INTRAMUSCULAR
Status: DISCONTINUED | OUTPATIENT
Start: 2021-01-01 | End: 2021-01-01

## 2021-01-01 RX ORDER — METOCLOPRAMIDE HYDROCHLORIDE 5 MG/ML
5 INJECTION INTRAMUSCULAR; INTRAVENOUS EVERY 8 HOURS PRN
Status: DISCONTINUED | OUTPATIENT
Start: 2021-01-01 | End: 2021-01-01

## 2021-01-01 RX ORDER — ACETAMINOPHEN 650 MG/1
650 SUPPOSITORY RECTAL EVERY 4 HOURS PRN
Status: DISCONTINUED | OUTPATIENT
Start: 2021-01-01 | End: 2021-01-01

## 2021-01-01 RX ORDER — LORAZEPAM 2 MG/ML
1 INJECTION INTRAMUSCULAR EVERY 4 HOURS PRN
Status: DISCONTINUED | OUTPATIENT
Start: 2021-01-01 | End: 2021-01-01

## 2021-01-01 RX ORDER — FENTANYL 12 UG/H
1 PATCH TRANSDERMAL
OUTPATIENT
Start: 2021-01-01

## 2021-01-01 RX ORDER — CLOPIDOGREL BISULFATE 75 MG/1
75 TABLET ORAL DAILY
Status: DISCONTINUED | OUTPATIENT
Start: 2021-01-01 | End: 2021-01-01

## 2021-01-01 RX ORDER — ATORVASTATIN CALCIUM 40 MG/1
40 TABLET, FILM COATED ORAL NIGHTLY
Status: DISCONTINUED | OUTPATIENT
Start: 2021-01-01 | End: 2021-01-01

## 2021-01-01 RX ORDER — SALIVA STIMULANT COMB. NO.3
SPRAY, NON-AEROSOL (ML) MUCOUS MEMBRANE AS NEEDED
Status: DISCONTINUED | OUTPATIENT
Start: 2021-01-01 | End: 2021-01-01

## 2021-01-01 RX ORDER — HYDROXYUREA 500 MG/1
500 CAPSULE ORAL 2 TIMES DAILY
Qty: 60 CAPSULE | Refills: 3 | Status: SHIPPED | OUTPATIENT
Start: 2021-01-01 | End: 2021-01-01 | Stop reason: DRUGHIGH

## 2021-01-01 RX ORDER — ONDANSETRON 2 MG/ML
4 INJECTION INTRAMUSCULAR; INTRAVENOUS EVERY 6 HOURS PRN
Status: DISCONTINUED | OUTPATIENT
Start: 2021-01-01 | End: 2021-01-01

## 2021-01-01 RX ORDER — DEXTROSE MONOHYDRATE 25 G/50ML
50 INJECTION, SOLUTION INTRAVENOUS
Status: DISCONTINUED | OUTPATIENT
Start: 2021-01-01 | End: 2021-01-01

## 2021-01-01 RX ORDER — HEPARIN SODIUM 5000 [USP'U]/ML
5000 INJECTION, SOLUTION INTRAVENOUS; SUBCUTANEOUS EVERY 8 HOURS SCHEDULED
Status: DISCONTINUED | OUTPATIENT
Start: 2021-01-01 | End: 2021-01-01

## 2021-01-01 RX ORDER — SALIVA STIMULANT COMB. NO.3
SPRAY, NON-AEROSOL (ML) MUCOUS MEMBRANE AS NEEDED
OUTPATIENT
Start: 2021-01-01

## 2021-01-01 RX ORDER — AMLODIPINE BESYLATE 5 MG/1
5 TABLET ORAL DAILY
Status: DISCONTINUED | OUTPATIENT
Start: 2021-01-01 | End: 2021-01-01

## 2021-01-01 RX ORDER — LORAZEPAM 2 MG/ML
2 INJECTION INTRAMUSCULAR EVERY 4 HOURS PRN
Status: DISCONTINUED | OUTPATIENT
Start: 2021-01-01 | End: 2021-01-01

## 2021-01-01 RX ORDER — SCOLOPAMINE TRANSDERMAL SYSTEM 1 MG/1
1 PATCH, EXTENDED RELEASE TRANSDERMAL
Status: DISCONTINUED | OUTPATIENT
Start: 2021-01-01 | End: 2021-01-01

## 2021-01-01 RX ORDER — HALOPERIDOL 5 MG/ML
2 INJECTION INTRAMUSCULAR
Status: DISCONTINUED | OUTPATIENT
Start: 2021-01-01 | End: 2021-01-01

## 2021-01-01 RX ORDER — ACETAMINOPHEN 325 MG/1
650 TABLET ORAL EVERY 4 HOURS PRN
Status: DISCONTINUED | OUTPATIENT
Start: 2021-01-01 | End: 2021-01-01

## 2021-01-01 RX ORDER — ACETAMINOPHEN 325 MG/1
650 TABLET ORAL EVERY 6 HOURS PRN
Status: DISCONTINUED | OUTPATIENT
Start: 2021-01-01 | End: 2021-01-01

## 2021-01-01 RX ORDER — ONDANSETRON 4 MG/1
4 TABLET, ORALLY DISINTEGRATING ORAL EVERY 6 HOURS PRN
Status: DISCONTINUED | OUTPATIENT
Start: 2021-01-01 | End: 2021-01-01

## 2021-01-01 RX ORDER — HYDROXYUREA 500 MG/1
500 CAPSULE ORAL 2 TIMES DAILY
Status: ON HOLD | COMMUNITY
End: 2021-01-01

## 2021-01-01 RX ORDER — POLYETHYLENE GLYCOL 3350 17 G/17G
17 POWDER, FOR SOLUTION ORAL DAILY PRN
Status: DISCONTINUED | OUTPATIENT
Start: 2021-01-01 | End: 2021-01-01

## 2021-01-01 RX ORDER — BISACODYL 10 MG
10 SUPPOSITORY, RECTAL RECTAL
OUTPATIENT
Start: 2021-01-01

## 2021-01-01 RX ORDER — ENOXAPARIN SODIUM 100 MG/ML
40 INJECTION SUBCUTANEOUS DAILY
Status: DISCONTINUED | OUTPATIENT
Start: 2021-01-01 | End: 2021-01-01

## 2021-01-01 RX ORDER — SODIUM CHLORIDE 9 MG/ML
INJECTION, SOLUTION INTRAVENOUS CONTINUOUS
Status: DISCONTINUED | OUTPATIENT
Start: 2021-01-01 | End: 2021-01-01

## 2021-01-01 RX ORDER — CHLORAL HYDRATE 500 MG
CAPSULE ORAL 2 TIMES DAILY
COMMUNITY
End: 2021-01-01 | Stop reason: CLARIF

## 2021-01-01 RX ORDER — ASPIRIN 325 MG
325 TABLET ORAL DAILY
Status: DISCONTINUED | OUTPATIENT
Start: 2021-01-01 | End: 2021-01-01

## 2021-01-01 RX ORDER — GABAPENTIN 100 MG/1
100 CAPSULE ORAL 3 TIMES DAILY
Status: DISCONTINUED | OUTPATIENT
Start: 2021-01-01 | End: 2021-01-01

## 2021-01-13 NOTE — PROGRESS NOTES
Patient Name: Claudene Hails  :    Medical Record #: [id number]      Therapeutic Donor History    Donor History    When was your last healthy meal? Patient reports last meal was at noon today.      When was the last time you did any strenuous act she takes 2 daily     • Co-Enzyme Q-10 30 MG Oral Cap Take by mouth. One tab at night     • aspirin 81 MG Oral Tab Take 81 mg by mouth daily. Does not take daily. Only took prior to angio.  Patient takes omega 3 and tumeric as substitute      • HYDROmorphon

## 2021-01-13 NOTE — TELEPHONE ENCOUNTER
Patient was informed that Dr Mccracken Officer would like to start phleb ASAP with weekly cbc.  She was instructed to increase hydrea to 500 mg bid

## 2021-01-13 NOTE — PROGRESS NOTES
Patient Name: Paige Sahu  : [birth year]   Medical Record #: [id number]      Therapeutic Donor Physical and Record of Collection    Order:  Therapeutic Phlebotomy  Order Date: 2021    Expiration Date: 2022   Frequency:  Weekly x Six Months   P staying well hydrated and eating small frequent meals through out the day. No strenuous activity. Patient agreeable with plan. Encouraged to contact office with any questions and or concerns.      Collection completed by:  Noé Garcia    Patient discharged:  T

## 2021-01-20 NOTE — PROGRESS NOTES
ANP Visit Note    Patient Name: Lucien Grant   YOB: 1945   Medical Record Number: [id number]   CSN: [id number]   Date of visit: 1/20/2021   Trung Nunez DO   No primary care provider on file.      Chief Complaint/Reason for Visit:  Chronic mye Demand ischemia (HCC)     Dyslipidemia     TASHA on CPAP     S/P CABG x 3     Chronic obstructive pulmonary disease (HCC)     Diabetic polyneuropathy associated with diabetes mellitus due to underlying condition (HCC)     CKD (chronic kidney disease) stage 3 • CATH DRUG ELUTING STENT     • CATH PERCUTANEOUS  TRANSLUMINAL CORONARY ANGIOPLASTY      cardiac and peripheral   • CHOLECYSTECTOMY  '95   • FEMORAL PERIPHERAL BYPASS Right 2/10/2015    Performed by Margie Cabrera MD at [facility] reaction(s):  Other (See Comments)  Tramadol                PAIN    Comment:Other reaction(s): Headache, HEADACHES, Other (See             Comments)  Tylenol With Codeine    OTHER (SEE COMMENTS)    Comment: Oral,             Migraine headache  Acetaminophen file      Intimate partner violence        Fear of current or ex partner: Not on file        Emotionally abused: Not on file        Physically abused: Not on file        Forced sexual activity: Not on file    Other Topics      Concerns:        [address] [facility] [address] Ser Clopidogrel Bisulfate 75 MG Oral Tab, Take 1 tablet (75 mg total) by mouth daily. , Disp: 30 tablet, Rfl: 11  •  Specialty Vitamins Products (INULOSE BLOOD SUGAR SUPPORT) Oral Cap, Take 1 capsule by mouth 2 (two) times daily. , Disp: , Rfl:   •  Probiotic Pr See morphology below (A) Normal, Slide reviewed, see previous RBC morphology.     Platelet Morphology Normal Normal    Microcytosis 1+      Macrocytosis 1+             Impression/Plan:  1. P Vera: discussed with Dr May Armijo and will reduce volume of phlebotomie

## 2021-01-20 NOTE — PROGRESS NOTES
Patient Name: Yeison You  :    Medical Record #: [id number]      Therapeutic Donor Physical and Record of Collection    Order:  Therapeutic Phlebotomy  Order Date:  21 Expiration Date:  22  Frequency:  Weekly  Parameters:   Therapeu . Weight of unit:  499 ml 605 gr    Patient given post donation instructions:  Pt given last week after procedure. Post collection blood pressure:  127/71    Collection completed by:  Marixa Haro    Patient discharged:   To home    Patient accompan differently: Take 50 mg by mouth 2 (two) times a day.  ) 90 tablet 3   • bumetanide 1 MG Oral Tab 1 mg daily. • sodium bicarbonate 650 MG Oral Tab Take 650 mg by mouth as needed for Heartburn. • GARLIC OR Take by mouth.  Pt thinks she takes 2 medhat

## 2021-01-27 NOTE — TELEPHONE ENCOUNTER
Toxicities: Hydroxyurea    Patient of Dr Isaac Neal - Myelofibrosis    Epistaxis: (Patient reports over the last two days when she would blow her nose she would see some like pink color to her mucus.  Today she blew her nose and a large blood clot came out of her

## 2021-01-27 NOTE — TELEPHONE ENCOUNTER
Liliana Camilo at [phone] has a very bad nose bleed to where she can not breathe and if she blow her nose they start to bleeding again. Isi Medina pt.

## 2021-01-27 NOTE — ED INITIAL ASSESSMENT (HPI)
Pt c/o epistaxis from right nare since this morning. States is related to her hydroxyurea. Pt denies c/o HA.   Pt states is on plavix and aspirin

## 2021-01-28 NOTE — TELEPHONE ENCOUNTER
Colt Betancourt was in the hospital last night for a nose bleed and they did a CBC, and she says she no longer needs the phlebotomy today. She is asking what next steps should be.

## 2021-01-28 NOTE — TELEPHONE ENCOUNTER
Reviewed with patient that DR Ena Flores recommends cbc with possible phlebotomy in 2 weeks, and then MD with PL in 4 weeks. appts adjusted accordingly.    Pt reports that the packing has already fallen out of her nose, she was instructed to go back to the ER

## 2021-01-28 NOTE — ED PROVIDER NOTES
Patient Seen in: [facility] Emergency Department      History   Patient presents with:  Nose Bleed    Stated Complaint: nose bleed since morning    HPI/Subjective:   HPI    Patient is 67years old, has a medical history as noted below.     She has el 2/10/2015    Performed by Guillermina Wolf MD at [address] [place] [address] N/A 2/20/2018    Performed by Sandy Gamez MD at [facility]  abdominoplasty '98   • OTHER SURGICAL HISTORY  breast reduction '98   • OTH round, and reactive to light. Neck: Normal range of motion. Neck supple. Cardiovascular: Normal rate, regular rhythm  Pulmonary/Chest: Normal effort. No accessory muscle use. No clubbing, no cyanosis. Abdominal: Soft.  Bowel sounds are normal.   Neur Plan     Clinical Impression:  Epistaxis  (primary encounter diagnosis)    Disposition:  Discharge  1/27/2021  6:43 pm    Follow-up:  Ebony Garcia MD  [address] [place] [address] Tika Pereira [address] [place] [phone]    Schedule an appointment as soon a

## 2021-01-28 NOTE — ED INITIAL ASSESSMENT (HPI)
75YF c/c of nose bleed Pt state she was seen here this afternoon and d/c with packet nose Pt state the bleed has continue and come through the packing

## 2021-01-28 NOTE — CONSULTS
[facility]  Report of Consultation    [place] Patient Status:  Inpatient    1945 MRN [id number]   [facility] 6NE-A Attending [facility] Day # 1 PCP DO Sharif Fernández kidney injury) ([facility]     Cardiorenal syndrome     Acute renal failure superimposed on chronic kidney disease, unspecified CKD stage, unspecified acute renal failure type ([facility]     Junctional bradycardia      1.   Would recommend a dual chamber PPM for sympt stents x8 total   • Deep vein thrombosis (HCC)     right leg   • DIABETES    • Diabetes ([facility]    • Disorder of liver     enzymes were elevated recently   • Elevated cholesterol    • Elevated hemoglobin A1c    • Fibromyalgia    • Heart attack ([facility] 03/2008 Performed by Alejandro Oliva MD at [facility]   • TONSILLECTOMY       Family History   Problem Relation Age of Onset   • Heart Disorder Father    • Diabetes Mother    • Diabetes Brother    • Heart Disorder Brother       reports that she quit 50 % injection 50 mL, 50 mL, Intravenous, Q15 Min PRN **OR** glucose (DEX4) oral liquid 30 g, 30 g, Oral, Q15 Min PRN **OR** Glucose-Vitamin C (DEX-4) 4-6 GM-MG chewable tab 8 tablet, 8 tablet, Oral, Q15 Min PRN  •  Insulin Aspart Pen (NOVOLOG) 100 UNIT/ML 2+.  Neurologic: Alert and oriented, normal affect. Skin: Warm and dry.      Laboratories and Data:  Diagnostics:    Labs:   Lab Results   Component Value Date    WBC 23.8 06/20/2019    RBC 3.40 06/20/2019    HGB 10.4 06/20/2019    HCT 30.4 06/20/2019    M Attending Attestation (For Attendings USE Only)...

## 2021-01-28 NOTE — ED PROVIDER NOTES
Patient Seen in: [facility] Emergency Department      History   Patient presents with:  Nose Bleed    Stated Complaint: nosebleed, seen here for same earlier, on plavix    HPI/Subjective:   HPI    69-year-old female with a history of cord artery dis 2014    Rt common fem arthroplasry, stent   • ANGIOPLASTY (CORONARY)  5/12/15    right popliteal   •      • CABG  2018   • CATARACT     • CATH DRUG ELUTING STENT     • CATH PERCUTANEOUS  TRANSLUMINAL CORONARY ANGIOPLASTY      cardiac and p MEROCEL packing is noted to the right nostril. I used gauze to squeeze this dry and she was observed in the emergency department without any further bleeding. I did not appreciate any significant saturation of the packing.   Neuro: No focal deficit is no

## 2021-02-08 NOTE — PATIENT INSTRUCTIONS
Refill policies:    • Allow 2-3 business days for refills; controlled substances may take longer.   • Contact your pharmacy at least 5 days prior to running out of medication and have them send an electronic request or submit request through the “request re Depending on your insurance carrier, approval may take 3-10 days. It is highly recommended patients contact their insurance carrier directly to determine coverage.   If test is done without insurance authorization, patient may be responsible for the entire for an EKG tracing. Head will be measured using a washable grease pencil. Head will be prepped with a mild abrasive for good conductivity. Electrodes are applied with paste and gauze.   Paste is water soluble so most of it will be cleaned out of your vic

## 2021-02-08 NOTE — PROGRESS NOTES
[id number] [facility] with [facility]  2/8/2021    1:35 PM    Since August 2020 follow up:    HISTORY:  October 26 had an episode of Dizziness and banner in bottom were spinning and that lasted a minute.      Gordy •  Specialty Vitamins Products (INULOSE BLOOD SUGAR SUPPORT) Oral Cap, Take 1 capsule by mouth 2 (two) times daily. , Disp: , Rfl:   •  Probiotic Product (PROBIOTIC DAILY OR), Take 2 capsules by mouth daily. , Disp: , Rfl:      Prior workup showed intracrani 2/8/2021, Time completed 2:00 PM

## 2021-02-10 NOTE — PROGRESS NOTES
Patient here today for a therapeutic phlebotomy. PL CBC was drawn. Result: HGB - 10.9 and Hematocrit- 35.6. Per  therapeutic phlebotomy order from Dr. SPENCE [facility], Do not perform phlebotomy if HGB<15 or Hematocrit<45. Patient stated she understood the results.

## 2021-02-23 PROBLEM — N39.0 URINARY TRACT INFECTION WITHOUT HEMATURIA, SITE UNSPECIFIED: Status: ACTIVE | Noted: 2021-01-01

## 2021-02-23 PROBLEM — R79.89 AZOTEMIA: Status: ACTIVE | Noted: 2021-01-01

## 2021-02-23 PROBLEM — I63.9 ACUTE CVA (CEREBROVASCULAR ACCIDENT) (HCC): Status: ACTIVE | Noted: 2021-01-01

## 2021-02-23 NOTE — SLP NOTE
ADULT SWALLOWING EVALUATION    ASSESSMENT    ASSESSMENT/OVERALL IMPRESSION:  Patient is a 68 y/o female admitted with CVA. SLP orders received to evaluate and treat d/t stroke protocol. Patient awake in bed with  present at bedside.  She denied histo 03/2008    stents x2 at that time. • High cholesterol    • History of blood transfusion     x2 transfusions last one about 2015 or so.    • HTN (hypertension) 7/11/2013   • Intermittent claudication (HCC)    • Neuropathy    • Osteoarthritis    • Peripher aspiration.)    Esophageal Phase of Swallow: No complaints consistent with possible esophageal involvement  Comments: discussed with RN              GOALS  Goal #1 The patient will tolerate regular consistency and thin liquids without overt signs or sympto

## 2021-02-23 NOTE — ED PROVIDER NOTES
Patient Seen in: [facility] Emergency Department      History   Patient presents with:  Abnormal Labs    Stated Complaint: Abnormal MRI     HPI/Subjective:   HPI    13-year-old female was referred to the emergency department by her neurologist Dr. Kimani Fung Normal S1-S2 without extra sounds or murmurs. Regular rate and rhythm. Abdomen is soft and nontender without masses or rebound. Extremities are atraumatic. Skin is dry without rashes or lesions. Neuro exam: Alert and oriented x4. Speech is fluent.   Marisela Botello DIFFERENTIAL[[id number]]          Abnormal            Final result                 Please view results for these tests on the individual orders.    SCAN SLIDE   RAINBOW DRAW BLUE   RAINBOW DRAW LAVENDER   RAINBOW DRAW LIGHT GREEN   RAINBOW DRAW GOLD   RAPID significant abnormal parenchymal gradient susceptibility. FLAIR abnormalities in the white matter are again identified. Aside from new infarction, these are stable. The visualized paranasal sinuses and mastoid air cells are unremarkable.    The expected of urinary tract infection. MDM      #1. Acute stroke. Ischemic infarct in the right frontal lobe. 2.  Myelofibrosis. 3.  Pacemaker. 4.  Urinary tract infection. 5.  Anemia. 6.  Azotemia.                          Disposition and Plan     Clin

## 2021-02-23 NOTE — H&P
[facility] HOSPITAL[facility]  History and Physical     Tiny Zaria Barger Patient Status:  Inpatient    1945 MRN [id number]   [facility] 7NE-A Attending Brittanie Rodas MD   Hosp Day # 0 PCP Peace Kauffman DO     Chief Complaint: + MRI    History of Pre CABG  02/20/2018   • CATARACT     • CATH DRUG ELUTING STENT     • CATH PERCUTANEOUS  TRANSLUMINAL CORONARY ANGIOPLASTY      cardiac and peripheral   • CHOLECYSTECTOMY  '95   • FEMORAL PERIPHERAL BYPASS Right 2/10/2015    Performed by Kenia Hernandez MD at [facility]  Alfuzosin               DIZZINESS  Gluten Flour            OTHER (SEE COMMENTS)    Comment:Pt feels gluten elevates her blood sugar  Lisinopril              OTHER (SEE COMMENTS)    Comment:HIGH BLOOD SUGAR             Oral, hyperglycemia , Disp: , Rfl:     •  hydrALAzine HCl 10 MG Oral Tab, Take 1 tablet (10 mg total) by mouth 4 (four) times daily as needed. , Disp: 90 tablet, Rfl: 0    •  HYDROmorphone HCl (DILAUDID) 2 MG Oral Tab, Take 1 tablet (2 mg total) by mouth every 4 (four) hours 1. Slurred speech, acute findings on MRI  1. Neurochecks  2. Neuro eval   3. Echo   2. UTI  1. CEftriaxone   3. Hypertension   4. DM type 2   1.  ISS    Quality:  · DVT Prophylaxis: Lovenox   · CODE status: full  · Schwab: no  · If COVID testing is negat

## 2021-02-23 NOTE — ED INITIAL ASSESSMENT (HPI)
Pt has hx of TIA, pt has been trouble walking and speaking since July but is worse the past 10days . Pt states she had outpatient MRI done that showed a stroke.

## 2021-02-24 NOTE — CONSULTS
[facility]  Report of Consultation    Gal Soriano Patient Status:  Inpatient    1945 MRN [id number]   [facility] 7NE-A Attending Molly Bauman MD   [facility] Day # 1 PCP Yamini Larkin DO     Reason for Consultation:    The patient wa • Proteinuria    • Sleep apnea     CPAP   • Unspecified essential hypertension    • Visual impairment     scar on left retina; glasses       Past Surgical History:   Procedure Laterality Date   • ABLATION      venous   • ANGIOPLASTY (CORONARY)  5/2014 muscle weakness in the legs             CLASS, feet turned black             Feet turn black, muscle weakness in the legs  Warfarin                OTHER (SEE COMMENTS)    Comment:Pt says she developed sores             Oral, coumadin necrosis             P g, 17 g, Oral, Daily PRN  •  bisacodyl (DULCOLAX) rectal suppository 10 mg, 10 mg, Rectal, Daily PRN  •  0.9% NaCl infusion, , Intravenous, Continuous  •  acetaminophen (TYLENOL) tab 650 mg, 650 mg, Oral, Q4H PRN **OR** acetaminophen (TYLENOL) 650 MG recta wheezes. Cardiovascular: Regular rate and rhythm. Gastrointestinal: Soft, non tender with good bowel sounds. Extremities: No edema. No calf tenderness. Neurological: Grossly intact without focal motor or sensory deficit. Lymphatics:  There is no palpa arteries are unremarkable. An anterior communicating artery is seen. The branches of the anterior cerebral and middle cerebral arteries are unremarkable. A small bilateral posterior communicating arteries are seen.   The branches of the po resting pain (HCC)     Pulmonary edema cardiac cause ([address]     Demand ischemia (HCC)     Dyslipidemia     TASHA on CPAP     S/P CABG x 3     Chronic obstructive pulmonary disease (HCC)     Diabetic polyneuropathy associated with diabetes mellitus due to unde

## 2021-02-24 NOTE — PROGRESS NOTES
[facility]  Progress Note    Dee Dee Barger Patient Status:  Inpatient    1945 MRN [id number]   [facility] 7NE-A Attending Shanell Alcantara MD   [facility] Day # 1 PCP Shruthi Alexis DO     CC: Slurred speech    SUBJECTIVE:  Slurred speech p 209.0 02/23/2021    CREATSERUM 1.86 02/24/2021    BUN 54 02/24/2021     02/24/2021    K 4.5 02/24/2021     02/24/2021    CO2 23.0 02/24/2021     02/24/2021    CA 8.3 02/24/2021    ALB 3.1 02/23/2021    ALKPHO 229 02/23/2021    BILT 0.6 0 stable. The visualized paranasal sinuses and mastoid air cells are unremarkable. The expected major intracranial flow voids are present.      MRA BRAIN  The upper cervical, petrous, cavernous, and supraclinoid internal carotid arteries are unremar Rectal, Daily PRN    •  0.9% NaCl infusion, , Intravenous, Continuous    •  acetaminophen (TYLENOL) tab 650 mg, 650 mg, Oral, Q4H PRN    Or    •  acetaminophen (TYLENOL) 650 MG rectal suppository 650 mg, 650 mg, Rectal, Q4H PRN    •  Labetalol HCl (TRANDAT 6/21/2019 for symptomatic permanent and irreversible bradycardia and sinus node dysfunction and syncope  7. CAD with previous CABG with CV surgery Dr. Shazia Gama in 2018  8.  Peripheral vascular disease with previous right femoropopliteal bypass with vascular surg

## 2021-02-24 NOTE — PHYSICAL THERAPY NOTE
PHYSICAL THERAPY EVALUATION - INPATIENT     Room Number: 5764/3743-S  Evaluation Date: 2/24/2021  Type of Evaluation: Initial  Physician Order: PT Eval and Treat    Presenting Problem: acute CVA  Reason for Therapy: Mobility Dysfunction and Discharge stents x2 at that time. • High cholesterol    • History of blood transfusion     x2 transfusions last one about 2015 or so.    • HTN (hypertension) 7/11/2013   • Intermittent claudication [facility])    • Neuropathy    • Osteoarthritis    • Peripheral vascular Lives With: Spouse  Drives: No(not since stroke in 7/2020)  Patient Owned Equipment: Cane(rollator)  Patient Regularly Uses: Glasses    Prior Level of Laverne: Pt reports she lives with a very supportive  and he helps her with anything she Alternating Movement: Right decreased accuracy; Right decreased speed;Left decreased accuracy; Left decreased speed  Sensation: Intact light touch sensation to BUE/BLE. Pt reports baseline painful neuroapthy in B feet.  Pt reports occasional painful \"taser l seated in chair. Thorough history obtained. Pt reports \"I am not talking well today\" - pt's speech was very easy to understand, mild slurring heard on some words, and pt did have mild difficulty with saliva management while speaking.  Pt performed sit to Discharge Recommendations: Home with home health PT    PLAN  PT Treatment Plan: Bed mobility; Coordination; Endurance; Energy conservation;Patient education; Family education;Gait training;Neuromuscular re-educate;Strengthening;Transfer training;Balance traini

## 2021-02-24 NOTE — PROCEDURES
ELECTROENCEPHALOGRAM REPORT      Patient Name: Sherine Meadows   : [birth year]   Requesting Physician: Dr. David Herndon   Date of Test: 2021   History: 67year old male with frequent slurred speech    TECHNICAL ASPECTS OF EEG RECORDING:  This is a scalp EEG

## 2021-02-24 NOTE — PLAN OF CARE
Assumed care at 0700  MRA carotids, echo, and EEG completed  No new neuro deficits  Pt/ot worked with patient

## 2021-02-24 NOTE — PLAN OF CARE
Received pt at 1930  Pt AOx4, Atrial paced, RA refused CPAP NOC, VSS  Q2 neuro completed 2300. Now Q4 neuro, no acute changes. Baseline slight slurred speech. LLE slightly weaker than RLE.  Pt reports that LUE experiences tremors, usually early in the AM applicable  - Encourage broncho-pulmonary hygiene including cough, deep breathe, Incentive Spirometry  - Assess the need for suctioning and perform as needed  - Assess and instruct to report SOB or any respiratory difficulty  - Respiratory Therapy support be responsible for managing their own health  - Refer to Case Management Department for coordinating discharge planning if the patient needs post-hospital services based on physician/LIP order or complex needs related to functional status, cognitive abilit

## 2021-02-24 NOTE — PROGRESS NOTES
[facility] Pharmacy Note:  Renal Dose Adjustment for Metoclopramide (REGLAN)    Levora Cornea has been prescribed Metoclopramide (REGLAN) 10 mg every 8 hours as needed for nausea, vomiting.     Estimated Creatinine Clearance: 18.1 mL/min (A) (based on SCr of 1.99 m

## 2021-02-24 NOTE — CONSULTS
[facility]    Report of Consultation    Levora Cornea Patient Status:  Inpatient    1945 MRN [id number]   [facility] 7NE-A Attending Latrice Cartagena MD   [facility] Day # 0 PCP Jesus Walters DO     Date of Admission:  2021  Date of Neuropathy    • Osteoarthritis    • Peripheral vascular disease (HCC)     stents and bypass right leg, 2/2015   • Proteinuria    • Sleep apnea     CPAP   • Unspecified essential hypertension    • Visual impairment     scar on left retina; glasses     Past use.    Allergies:    Statins                 OTHER (SEE COMMENTS)    Comment:Feet turn black, muscle weakness in the legs             CLASS, feet turned black             Feet turn black, muscle weakness in the legs  Warfarin                OTHER (SEE COM Min PRN  •  hydrALAzine HCl (APRESOLINE) injection 10 mg, 10 mg, Intravenous, Q2H PRN  •  ondansetron HCl (ZOFRAN) injection 4 mg, 4 mg, Intravenous, Q6H PRN **OR** Metoclopramide HCl (REGLAN) injection 5 mg, 5 mg, Intravenous, Q8H PRN  •  aspirin 300 MG r Finger-to-nose coordination is intact. Gait deferred.     Diagnostic Data:   Lab Results   Component Value Date     02/23/2021    K 5.1 02/23/2021     02/23/2021    CO2 25.0 02/23/2021    BUN 53 02/23/2021    CREATSERUM 1.99 02/23/2021    GLU 1 current use of insulin ([address]     Spinal stenosis of lumbar region     Spondylolisthesis of lumbar region     Mild nonproliferative diabetic retinopathy of left eye without macular edema associated with type 2 diabetes mellitus ([address]     PAOD (peripheral ar hyperviscosity? Consult Hematology.     Speech/PT      Thank you for allowing me to participate in the evaluation of your patient,    Flex Salazar, DO  Neuromuscular and General Neurology  [facility]   pager [phone]

## 2021-02-24 NOTE — PROGRESS NOTES
Orders received and chart review completed. Attempted x2 to see Pt for initial OT eval. On AM attempt, Pt speaking with physician at bedside. Pt off unit in MRI at time of PM attempt. Will f/u tomorrow.

## 2021-02-24 NOTE — CM/SW NOTE
Pt is a 66 yo female admitted for stroke-like symptoms. Pt is +CVA. Pt on CVA protocol. Pt has had strokes in past and has been hospitalized several times last year. Pt lives with her . PT to see.   SW to f/u for any needs at [facility].       02/24/21

## 2021-02-24 NOTE — PROGRESS NOTES
[id number] [facility] Progress Note    Debby Barger Patient Status:  Inpatient    1945 MRN [id number]   [facility] 7NE-A Attending Ivanna Jeffries MD   [facility] Day # 1 PCP Carol Delgado DO     CC:  Worsening speech and left sided w pending    2/24/2021 EEG- pending    2/24/2021 MRA Carotids- pending    2/23/2021 MRI/MRA Brain  There is a new small infarct in the right frontal lobe posterior deep periventricular white matter that extends towards the coronal radiata and centrum semiova reaction to statin/muscle pain, can she start Crestor three times a week, otherwise refer back to cardiologist for starting newer cholesterol lowering agent (not approved by insurance in past), continue dual antiplatelet        DO Dionne Isabelus

## 2021-02-25 NOTE — HOME CARE LIAISON
Met with patient at the bedside. Patient states she is undecided as far as whether or not she wishes to receive [address]. Residential brochure and medicare scores provided with contact information in case anything changes.  All questions address

## 2021-02-25 NOTE — PHYSICAL THERAPY NOTE
Attempted to see pt for PT tx session. Per discussion with OT, RN stated pt with worsening stroke symptoms today and is not appropriate for therapy at this time. Will follow and re-attempt as able and appropriate.

## 2021-02-25 NOTE — CM/SW NOTE
PT is recommending HHPT. Pt was seen by Adela Torres at [facility] who said pt was undecided about HH at this time. Adela Torres will f/u with pt closer to dc to find out if pt will be agreeable to [facility].

## 2021-02-25 NOTE — PROGRESS NOTES
[id number] [facility] Neu[facility] Progress Note    Jeison Delmi Barger Patient Status:  Inpatient    1945 MRN [id number]   [facility] 7NE-A Attending Radha Medel MD   [facility] Day # 2 PCP Dino Adams DO       Subjective:   Justina Villaseñor is a PRN    •  Labetalol HCl (TRANDATE) injection 10 mg, 10 mg, Intravenous, Q10 Min PRN    •  hydrALAzine HCl (APRESOLINE) injection 10 mg, 10 mg, Intravenous, Q2H PRN    •  ondansetron HCl (ZOFRAN) injection 4 mg, 4 mg, Intravenous, Q6H PRN    Or    •  Metocl dilated, no shunt     2/24/2021 EEG - normal     2/24/2021 MRA Carotids  CONCLUSION:  No significant stenosis or aneurysmal dilatation involving the major arterial structures in the neck.     2/23/2021 MRI/MRA Brain  There is a new small infarct in the rig Assessment/Plan:  Additions/editing/modifications made within above APN/PA plan, please see above  Recent right centrum semiovale stroke  Worsened left sided weakness today  Stat MRI DWI and MRA is pending, needs medtronic rep to adjust pacemaker  On p

## 2021-02-25 NOTE — CONSULTS
[facility] Cardiology  Report of Consultation    Von Friend Patient Status:  Inpatient    1945 MRN [id number]   [facility] 7NE-A Attending Kary Su MD   [facility] Day # 2 PCP Summer Wheeler DO     Reason for [facility] mg p.o. twice a day, Bumex 1 mg daily and hydralazine 10 mg 4 times a day as needed. Patient also takes dual antiplatelet therapy with baby aspirin and Plavix for history of CABG and history of CVA.     Patient has chronic dizziness issues per office notes • Unspecified essential hypertension    • Visual impairment     scar on left retina; glasses     Past Surgical History:   Procedure Laterality Date   • ABLATION      venous   • ANGIOPLASTY (CORONARY)  5/2014    RCA, ramus   • ANGIOPLASTY (CORONARY)  2007 Feet turn black, muscle weakness in the legs  Warfarin                OTHER (SEE COMMENTS)    Comment:Pt says she developed sores             Oral, coumadin necrosis             Pt says she developed sores             Pt says she developed sores Q6H PRN  •  PEG 3350 (MIRALAX) powder packet 17 g, 17 g, Oral, Daily PRN  •  bisacodyl (DULCOLAX) rectal suppository 10 mg, 10 mg, Rectal, Daily PRN  •  0.9% NaCl infusion, , Intravenous, Continuous  •  acetaminophen (TYLENOL) tab 650 mg, 650 mg, Oral, Q4H and tremor. All the signs and symptoms led to outpatient MRI prior to this admission. She was dropping objects with her left hand prior to admission. Allergic/Immunologic: no rhinitis or environmental allergies.     Physical Exam:  Blood pressure 130/37, the deep periventricular white matter adjacent to the basal ganglia correlates with region of known acute infarct. No hemorrhage or extra-axial fluid collection.     Dictated by (CST): Geovanna Rangel MD on 2/25/2021 at 11:24 AM     Finalized by (CST): Dictated by (CST): Angélica Nelson MD on 2/23/2021 at 12:38 PM     Finalized by (CST): Angélica Nelson MD on 2/23/2021 at 12:47 PM       Mri Stroke Brain Dwi Only(no Iv)(cpt=70551)    Result Date: 2/25/2021  PROCEDURE:  MRI STROKE BRAIN DWI ONLY(NO I Median sternotomy approximated by wire sutures. No pleural effusions. No pneumothorax. Mild interstitial opacities. Atheromatous calcifications of the aorta. Osteoarthritic changes within bilateral shoulders. CONCLUSION:  1.  Mild interstiti arterial structures in the neck. 2.  Elevated troponin level at 0.798 -no angina or congestive-like symptoms. History of mildly elevated troponin in previous years. No acute ischemia on EKG.   Elevated troponin could be related to acute stroke and mism while eating and so far no problem with swallowing per the patient's   -Neurochecks  -Physical and occupational therapy with speech pathology  -2D echo with Doppler -done  -We can interrogate pacemaker for any atrial arrhythmia in view of acute stro

## 2021-02-25 NOTE — SLP NOTE
Attempted to see patient for tx as per plan of care however Pt requested to defer today due to medical status. D/W RN. Will follow up at another time as Pt able to participate.    Karol Aase, MS CCC-SLP/L, pager [phone]  Speech-LanguagePathologist

## 2021-02-25 NOTE — HISTORICAL OFFICE NOTE
Progress Notes  - documented in this encounter  Pradeep Morris MD - 10/28/2020 2:15 PM CDT  Formatting of this note might be different from the original.  10/28/2020    [address] [place] [address] patch   • Other surgical history   hombectomy 2015   • Other surgical history   Cath with stent to RCA-3/07   • Pacemaker   • Peripheral angiogram   peripheral angiogram, plasty ant tib, popliteal R 7/2015, proximal LAD 10/25/2016   • Pr drug-eluting stent MG tablet Take 1 mg by mouth daily. • insulin detemir (Levemir FlexTouch) 100 UNIT/ML pen-injector Inject into the skin nightly. Prime 2 units before each dose. • hydrALAZINE (APRESOLINE) 10 MG tablet Take 10 mg by mouth 4 times daily as needed.  If BP non tender, difficult to assess abd aorta  Musculoskeletal: Gait acceptable for exercise  Skin: Warm, no cyanosis. Neuro: A & O, moving extremities  Psychiatric: Normal affect. Labs: Invalid input(s): BMP    Impression:      1.  Cerebrovascular a

## 2021-02-25 NOTE — CM/SW NOTE
BPCI-Advanced Medicare Program Note:  Plan of care reviewed for care coordination and discharge planning. Noted pt falls under  BPCI/Medicare program, with  for stroke.  Pt with acute stroke with slurred speech, with underlying hx of HTN, DM, myelofi

## 2021-02-25 NOTE — OCCUPATIONAL THERAPY NOTE
OCCUPATIONAL THERAPY                     OT evaluation attempted. Per nurse, the patient just returned from MRI and has worsening stroke symptoms. Will hole initiation of OT services until medically appropriate.

## 2021-02-25 NOTE — PROGRESS NOTES
[facility]  Progress Note    Sarah Beth Barger Patient Status:  Inpatient    1945 MRN [id number]   [facility] 7NE-A Attending Joe Fofana MD   [facility] Day # 2 PCP Isabella Cooper DO     CC: Slurred speech    SUBJECTIVE:  Slurred speech i Imaging:   MRI BRAIN/MRA BRAIN (CPT=70551/45004)     COMPARISON:  [facility] , CT, CTA BRAIN + CTA CAROTIDS (GPI=24367/67833), 7/07/2020, 7:09 PM.  [facility] , MR, MRI BRAIN (CPT=70551), 7/09/2020, 8:59 AM.     INDICATIONS:  G45.9 TIA (transient ischemic at and middle cerebral arteries are unremarkable. A small bilateral posterior communicating arteries are seen. The branches of the posterior cerebral and superior cerebellar arteries are unremarkable.        The basilar artery has a normal course and ca injection 10 mg, 10 mg, Intravenous, Q10 Min PRN    •  hydrALAzine HCl (APRESOLINE) injection 10 mg, 10 mg, Intravenous, Q2H PRN    •  ondansetron HCl (ZOFRAN) injection 4 mg, 4 mg, Intravenous, Q6H PRN    Or    •  Metoclopramide HCl (REGLAN) injection 5 m already on aspirin  4. Cardiology consult  8.  Peripheral vascular disease with previous right femoropopliteal bypass with vascular surgery Dr. Kareem Blackman in 2015-patient follows up with vascular surgery Dr. Kareem Blackman regarding the same as outpatient and had recent

## 2021-02-25 NOTE — PLAN OF CARE
Assumed care at [facility]. Pt a/ox4, neuro checks q4h, no neuro changes. VSS. Apaced per tele. RA. Denies pain. On IV abx. Pt updated w/ POC. Call light in reach. Will continue to monitor.

## 2021-02-25 NOTE — PROGRESS NOTES
02/25/21 1324   Clinical Encounter Type   Visited With Health care provider;Patient   Routine Visit   (Responded for POLST form)   Patient is currently on FULL code; however, provided POLST form to the patient as per the page/request received.  For furth

## 2021-02-26 PROBLEM — I25.10 CORONARY ARTERY DISEASE INVOLVING NATIVE CORONARY ARTERY OF NATIVE HEART WITHOUT ANGINA PECTORIS: Status: ACTIVE | Noted: 2018-02-20

## 2021-02-26 NOTE — PLAN OF CARE
Assumed care at 1. Pt a/ox4. VSS. Apaced per tele. Neuro checks q4h. Left facial droop, slurred speech, L sided weakness. Made pt npo d/t left facial droop, speech to see pt. Denies pain. SCD's applied. Pt updated w/ POC. Call light in reach.  Needs atte

## 2021-02-26 NOTE — PLAN OF CARE
Assumed care at 0700  Pt AxOx4, A-paced/SR w/ BBB  Left facial droop, worsened slurred speech, and L side weakness noted this AM. Neurology notified. CTH completed. Initially refusing MRI, but agreeable w/ ativan given prior.  Results discussed w/ Dr. Ricarda Krabbe

## 2021-02-26 NOTE — PROGRESS NOTES
Heme/Onc Progress Note - [facility]      Chief Complaint:    Follow up for evaluation and management of polycythemia vera and acute CVA. Interim History:      The patient has no new complaints.      Physical Examination:    Vital Signs: /37 (BP Location: cerebellar arteries are unremarkable. The basilar artery has a normal course and caliber. The bilateral vertebral arteries are unremarkable.   Takeoff of dominant left PICA is noted.    =====  CONCLUSION:  No significant changes since prior exam

## 2021-02-26 NOTE — PHYSICAL THERAPY NOTE
Attempted to see pt for PT tx session. Per discussion with OT, RN stated pt with noted elevated troponin and neuro symptoms are worse today. Pt is not appropriate for therapy at this time. Will follow and re-attempt as able and appropriate.

## 2021-02-26 NOTE — PROGRESS NOTES
Mithridion PPM checked at bedside. 1 800# called for rep to return call to [facility], [place] at [phone]. Will forward report when received from fax to RN on 0112 [facility].

## 2021-02-26 NOTE — PROGRESS NOTES
Hem/Onc Inpatient Note    Patient Name: Ramiro Garcia   YOB: 1945   Medical Record Number: [id number]   CSN: [id number]   Attending oncology Physician: Dr Anabel Adams    S: Patient has been having increased slurred speech as well as worsening left parmjit mg Oral Daily   • insulin detemir  5 Units Subcutaneous Nightly   • Metoprolol Succinate ER  50 mg Oral BID       PRN Medications:  glucose **OR** Glucose-Vitamin C **OR** dextrose **OR** glucose **OR** Glucose-Vitamin C, acetaminophen, PEG 3350, bisacodyl with decreased hemoglobin. She has high risk for recurrent thrombosis.  After discussion with neurology, will proceed with apixaban 5 mg BID, decrease the asa to 81 mg daily, and stop the plavix.      Will need to follow her blood counts very closely as an

## 2021-02-26 NOTE — PROGRESS NOTES
[id number] Jazzmine Zhang Neurology Progress Note    Aman Barger Patient Status:  Inpatient    1945 MRN [id number]   [facility] 7NE-A Attending Ahsan Barajas MD   [facility] Day # 3 PCP Giselle Levi, [address]      Neurology 75F with complex PMH including, DM, HTN, CAD, CKD, PVD, polycythemia vera, who presented from outpatient MRI with new infarct.   Patient was seen by Dr. Johana Curtis on 2/8/2021 for continued episodes of left side weakness, with hand tremor, and intermittent d Intravenous, Q8H PRN    •  cefTRIAXone Sodium (ROCEPHIN) 1 g in sodium chloride 0.9% 100 mL IVPB-ADDV, 1 g, Intravenous, Q24H    •  amLODIPine Besylate (NORVASC) tab 5 mg, 5 mg, Oral, Daily    •  insulin detemir (LEVEMIR) 100 UNIT/ML flextouch 5 Units, 5 U  Varying degrees of mild stenosis is likely due to atherosclerotic disease    2/25/2021 MRI, MRA Brain  CONCLUSION:  Right frontal lobe infarct extending to the deep periventricular white matter in the posterior aspect of the right frontal lobe and adjacen

## 2021-02-26 NOTE — SLP NOTE
Attempted to see patient for swallow re-evaluation at bedside however RN requested SLP to defer for now due to Pt status. SLP to follow up at another time, if/when appropriate.    Americo Zhang MS CCC-SLP/L, pager [phone]  Speech-LanguagePathologist

## 2021-02-26 NOTE — PROGRESS NOTES
[facility]  Progress Note    Nancy Barger Patient Status:  Inpatient    1945 MRN [id number]   [facility] 7NE-A Attending Barbara Kuo MD   [facility] Day # 3 PCP Gemma Ireland DO     CC: Slurred speech    SUBJECTIVE:  Slurred speech a nerves normal.   Psych: Mood and affect appears normal      Labs:   Lab Results   Component Value Date    WBC 9.2 02/25/2021    HGB 8.1 02/25/2021    HCT 25.6 02/25/2021    .0 02/25/2021    CREATSERUM 1.65 02/25/2021    BUN 44 02/25/2021     0 parenchymal gradient susceptibility. FLAIR abnormalities in the white matter are again identified. Aside from new infarction, these are stable. The visualized paranasal sinuses and mastoid air cells are unremarkable.        The expected major int weakness & facial droop. FINDINGS:    The petrous, cavernous, and supraclinoid internal carotid arteries are unremarkable. The left distal cervical segment internal carotid artery has a tortuous course with minimal fusiform prominence.      An ante (MIRALAX) powder packet 17 g, 17 g, Oral, Daily PRN    •  bisacodyl (DULCOLAX) rectal suppository 10 mg, 10 mg, Rectal, Daily PRN    •  acetaminophen (TYLENOL) tab 650 mg, 650 mg, Oral, Q4H PRN    Or    •  acetaminophen (TYLENOL) 650 MG rectal suppository coli  5. Myelofibrosis  1. Patient was on hydroxyurea at home for the same which patient has not been taking recently according to patient-follow-up with regular outpatient hematologist regarding the same  6.  History of pacemaker placement by cardiology EP

## 2021-02-26 NOTE — ICD/PM
[employer] PPM interrogation reviewed with rep. No atrial arrhythmias noted. Lead parameters stable. AP 98%,  0%.      SHAYY Cid

## 2021-02-26 NOTE — PROGRESS NOTES
[facility]  Cardiology Progress Note    Subjective:  Describes some mild inspiratory chest discomfort off an on, but no obvious symptoms suggestive of angina.   Left facial droop and left-sided weakness persist.  Has now been initiated on Eliquis per D regurgitation. 4. Left atrium: The left atrial volume was moderately increased. 5. Right atrium: The atrium was moderately dilated. 6. Atrial septum: The septum was thickened. Echo contrast (agitated saline)      study showed no shunt.    7. Pulmonary Continue EC ASA 81mg PO daily.  - Interrogate [employer] PPM to assess for development of any atrial arrhythmias since last assessed in office 2/5/21 (none at that time)  - Records indicate she is intolerant to statins  - Continue to hold all antihy

## 2021-02-26 NOTE — OCCUPATIONAL THERAPY NOTE
OCCUPATIONAL THERAPY                          Patient chart reviewed, noted elevated troponin. Per nurse, neuro symptoms are worse today. Will hold OT eval for now and re-attempt when medically appropriate.

## 2021-02-26 NOTE — SLP NOTE
ADULT SWALLOWING RE-EVALUATION    ASSESSMENT    ASSESSMENT/OVERALL IMPRESSION:  Patient seen for re-evaluation of swallow due to increased slurred speech and weakness on left side reported yesterday. Patient made NPO.  Repeat MRI imaging yesterday reported: thin liquids, pills one at a time with applesauce. Recommend upright position for any PO intake. Recommend eliminate distractions during PO intake so Patient can fully attend to task at hand.    Video swallow study to be completed if CXR declines, increase claudication [facility])    • Neuropathy    • Osteoarthritis    • Peripheral vascular disease (HCC)     stents and bypass right leg, 2/2015   • Proteinuria    • Sleep apnea     CPAP   • Unspecified essential hypertension    • Visual impairment     scar on left r patient will utilize compensatory strategies as outlined by  BSSE (clinical evaluation) including Slow rate, Small bites, Small sips, Eliminate distractions with stand by assistance 90 % of the time across 2 sessions.   In Progress   Goal #4 Cognitive commu

## 2021-02-27 NOTE — HOSPICE RN NOTE
New hospice referral received, robert sent in. Called pt  to set up a meeting time and he was in the middle of a meeting and will call back.

## 2021-02-27 NOTE — OCCUPATIONAL THERAPY NOTE
Attempted to see pt this PM, spoke with RN and pt, pt not up for therapy at this time, OT will follow up at later date.

## 2021-02-27 NOTE — PROGRESS NOTES
02/27/21 1639   Clinical Encounter Type   Visited With Patient and family together;Health care provider  ( at bedside, also RN Gabe Sahu)   Routine Visit Introduction   Continue Visiting No  (unless requested)   Responded to consult for POLST.   RN stat

## 2021-02-27 NOTE — PROGRESS NOTES
[facility]  Progress Note    Teresa Barger Patient Status:  Inpatient    1945 MRN [id number]   [facility] 7NE-A Attending Molly Bauman MD   [facility] Day # 4 PCP Yamini Larkin DO     CC: Slurred speech    SUBJECTIVE:  Slurred speech a Mood and affect appears normal      Labs:   Lab Results   Component Value Date    WBC 11.8 02/27/2021    HGB 7.9 02/27/2021    HCT 25.0 02/27/2021    .0 02/27/2021    PGLU 114 02/27/2021       Imaging:   MRI BRAIN/MRA BRAIN (CPT=70551/67421)     COM BRAIN  The upper cervical, petrous, cavernous, and supraclinoid internal carotid arteries are unremarkable. An anterior communicating artery is seen. The branches of the anterior cerebral and middle cerebral arteries are unremarkable.        OMAR s Moderate stenosis in the proximal right M 2 branch is noted. Intimal irregularity in the right M2 branches is noted The branches of the anterior cerebral and middle cerebral arteries are overall unremarkable.        A small bilateral posterior communicat Labetalol HCl (TRANDATE) injection 10 mg, 10 mg, Intravenous, Q10 Min PRN    •  hydrALAzine HCl (APRESOLINE) injection 10 mg, 10 mg, Intravenous, Q2H PRN    •  ondansetron HCl (ZOFRAN) injection 4 mg, 4 mg, Intravenous, Q6H PRN    Or    •  Metoclopramide H 2/26  3. Diabetes mellitus type 2 with peripheral neuropathy  1. Follow Accu-Cheks  2. Hypoglycemia protocol  4. E. coli UTI  1.  Patient has been on IV Rocephin which is being changed to IV Zosyn on 2/27/2021 to treat possible aspiration pneumonia as well the patient be referred to [facility] on discharge?:  Follow-up with regular outpatient primary care physician and neurology  Estimated date of discharge: To be decided  Discharge is dependent on: Clinical progress, neuro work-up complete  At this point Ms. Delia Kahn

## 2021-02-27 NOTE — PROGRESS NOTES
[facility]  Cardiology Progress Note    Subjective:  No chest pain or shortness of breath.     Objective:  /40 (BP Location: Left arm)   Pulse 64   Temp 98.3 °F (36.8 °C) (Oral)   Resp 16   Ht 5' 1\" (1.549 m)   Wt 125 lb (56.7 kg)   SpO2 90%   B

## 2021-02-27 NOTE — PROGRESS NOTES
Hem/Onc Inpatient Note    Patient Name: Shyla Dang   YOB: 1945   Medical Record Number: [id number]   CSN: [id number]       S: Son at bedside, patient was sleeping.     Allergies:    Statins                 OTHER (SEE COMMENTS)    Comment:Feet dextrose **OR** glucose **OR** Glucose-Vitamin C, acetaminophen, PEG 3350, bisacodyl, acetaminophen **OR** acetaminophen, Labetalol HCl, hydrALAzine HCl, ondansetron HCl **OR** Metoclopramide HCl    Review of Systems: A comprehensive 10 point review of sys

## 2021-02-27 NOTE — PROGRESS NOTES
Assumed care at [address] [place] [address]. Patient A&Ox3-4. Tele apaced on 2L NC. Neuros q 4. Upon assessment noted absent left hand grasp, LUE and LLE strength 1/5. Per Dr. Delio Alvarenga assessment was the same when she saw her. SBP below parameters. IVF increased.  Remained fla

## 2021-02-27 NOTE — PLAN OF CARE
Assumed care at 0700  Pt AxOx4, A-paced/SR w/ BBB  Worsened neuro symptoms and drowsiness noted this AM. See flowsheets. Neurology notified. HOB flat and IVF started per neuro. BP meds held per cardiology. Neuros q4  ST eval, ok for chopped/thins.   PPM in

## 2021-02-27 NOTE — DIETARY NOTE
Δηληγιάννη Haider Barger     Admitting diagnosis:  Myelofibrosis [facility]) [D75.81]  Pacemaker [Z95.0]  Azotemia [R79.89]  Acute CVA (ce[facility]brovascular accident) ([facility] [I63.9]  Urinary tract infection without hematuria, site unspecifi

## 2021-02-28 NOTE — PLAN OF CARE
Received pt at 1930  Pt AOx4, NSR/ A paced, 4L, VSS  PRN morphine ordered for pain. Q4 neuro, no changes NOC  Call light within reach.  All needs currently met      Problem: CARDIOVASCULAR - ADULT  Goal: Maintains optimal cardiac output and hemodynamic st Problem: SKIN/TISSUE INTEGRITY - ADULT  Goal: Skin integrity remains intact  Description: INTERVENTIONS  - Assess and document risk factors for pressure ulcer development  - Assess and document skin integrity  - Monitor for areas of redness and/or skin b improved neurological status  Description: INTERVENTIONS  - Assess for and report changes in neurological status  - Initiate measures to prevent increased intracranial pressure  - Maintain blood pressure and fluid volume within ordered parameters to optimi

## 2021-02-28 NOTE — PROGRESS NOTES
[facility]  Progress Note    Diana Barger Patient Status:  Inpatient    1945 MRN [id number]   [facility] 7NE-A Attending Leena Robles MD   [facility] Day # 5 PCP Jo-Ann Chand DO     CC: Slurred speech    SUBJECTIVE:  Slurred speech a affect appears normal      Labs:   Lab Results   Component Value Date    PGLU 78 02/28/2021       Imaging:   MRI BRAIN/MRA BRAIN (CPT=70551/85633)     COMPARISON:  JEANA , CT, CTA BRAIN + CTA CAROTIDS (VDC=50671/76701), 7/07/2020, 7:09 PM.  [facility] , MR, Chani Pink An anterior communicating artery is seen. The branches of the anterior cerebral and middle cerebral arteries are unremarkable. A small bilateral posterior communicating arteries are seen.   The branches of the posterior cerebral and superio noted The branches of the anterior cerebral and middle cerebral arteries are overall unremarkable. A small bilateral posterior communicating arteries are seen. The branches of the posterior cerebral and superior cerebellar arteries are unremarkable. suppository 650 mg, 650 mg, Rectal, Q4H PRN    •  Labetalol HCl (TRANDATE) injection 10 mg, 10 mg, Intravenous, Q10 Min PRN    •  hydrALAzine HCl (APRESOLINE) injection 10 mg, 10 mg, Intravenous, Q2H PRN    •  ondansetron HCl (ZOFRAN) injection 4 mg, 4 mg,  wants DNR comfort care and planning to meet with hospice  3. Diabetes mellitus type 2 with peripheral neuropathy  1. Follow Accu-Cheks  2. Hypoglycemia protocol  4. E. coli UTI  1.  Patient has been on IV Rocephin which is being changed to IV Zosyn wants to be let go ',  at bedside who also states that patient and  had discussed in detail regarding this and patient's children  coming in today and hospice team meet with patient and family today afternoon  · [employer]: No  · Central line: No

## 2021-02-28 NOTE — SLP NOTE
ADULT SWALLOWING EVALUATION    ASSESSMENT    ASSESSMENT/OVERALL IMPRESSION:  Pt seen for repeat BSSE per pt request per the pt's RN; pt is now NPO. Pt reported minimal coughing while eating. RN did not report observing the pt to cough.  Pt received in bed w performed per protocol. RN, family and pt all aware of POC. Will continue to monitor closely. RECOMMENDATIONS   Diet Recommendations - Solids: Mechanical soft chopped  Diet Recommendations - Liquid: Thin by 1/2 tsp only.                         Comp impairment     scar on left retina; glasses       Prior Living Situation: Home with spouse  Diet Prior to Admission: Regular; Thin liquids           SWALLOWING HISTORY  Current Diet Consistency: NPO    Imaging Results: Right frontal lobe infarct extending t GOALS  Goal #1 VFSS may be warrated     Goal #2 The patient will tolerate chopped consistency and thin liquids without overt signs or symptoms of aspiration with 100 % accuracy over 2-3 session(s).     In Progress   Goal #3 The patient/family/caregi

## 2021-02-28 NOTE — PROGRESS NOTES
[id number] Jazzmine Zhang Neurology Progress Note    Debby Barger Patient Status:  Inpatient    1945 MRN [id number]   [facility] 7NE-A Attending Ivanna Jeffries MD   [facility] Day # 4 PCP Carol Delgado DO       Neurology Attending note      I hav continued episodes of left side weakness, with hand tremor, and intermittent difficulty speaking and this lead to outpatient testing.  Yesterday (2/25) with increased L-sided weakness and L facial droop; repeat MRI (done with rep due to PPM) as below    Cu Insulin Aspart Pen  1-10 Units Subcutaneous TID AC and HS   • cefTRIAXone  1 g Intravenous Q24H   • insulin detemir  5 Units Subcutaneous Nightly       Imaging:  No new neuro imaging      Impression:  Small R frontal lobe infarct, thromboembolic from her i anurag ambriz

## 2021-02-28 NOTE — PHYSICAL THERAPY NOTE
Per Jean-Paul Childs that per conversation with  Geni Whalen that patient verbalized to him that \"PT is too hard right now\" and patient feels that she is unable to participate at this time.   Will discharge PT services at this time per patient/family request as

## 2021-02-28 NOTE — PLAN OF CARE
Assumed care at 0700. Pt with increased O2 needs this am. Went from 2L-->4L. CXR obtained. IV Zosyn for possible PNA as well as UTI. Pt to remain NPO until speech re-eval.   Poor appetite. Generalized edema. IVF dc'd. New bp parameters -140.  P

## 2021-02-28 NOTE — OCCUPATIONAL THERAPY NOTE
Spoke with pt's  Neena Salomon via telephone regarding POC and pt's rehab goals. Neena Salomon spoke with pt who states that \"PT is too hard right now\" and pt does not feel she is able to participate.  Will DC current OT order per pt/family request as pt's goal seem

## 2021-02-28 NOTE — PLAN OF CARE
Assumed care of pt. @ 0830. A&Ox4. Sats 97% on 2L NC. We received pt. on 4L. Weaned off to 2L. No acute neuro changes. Atrial paced. Asymptomatic. Rates controlled. Abx infusing. Afebrile. Pt. States chronic shoulder and back pain 7/10.  Declined

## 2021-02-28 NOTE — PROGRESS NOTES
[facility]  Cardiology Progress Note    Subjective:  No chest pain or shortness of breath. Keeps saying that she does not want any kind of feeding tube.      Objective:  /43 (BP Location: Left arm)   Pulse 66   Temp 97.8 °F (36.6 °C) (Oral)   Res

## 2021-02-28 NOTE — HOSPICE RN NOTE
Brief meeting with patient and spouse at bedside. [facility] plan to meet with patient and family at 2pm for hospice discussion.

## 2021-02-28 NOTE — HOSPICE RN NOTE
Met with pt, pt  and pt children in the room. Goals of care were discussed as well as hospice philosophy and services. Pt family wishes to think it over more and would like follow up on Monday at 2:30pm. Updated Jaqueline Leblanc RN.

## 2021-03-01 NOTE — HOSPICE RN NOTE
Follow up to answer questions. Family expressed concerns related to how to care for her at home; caregiver list provided.   They expressed that Norma Wills has a habit of saying \"I'm ok\" when she is having pain and encouraged her to accept meds/verbalize ne

## 2021-03-01 NOTE — PROGRESS NOTES
[id number] Jazzmine Zhang Neurology Progress Note    Edith Barger Patient Status:  Inpatient    1945 MRN [id number]   [facility] 7NE-A Attending Govind Liu MD   [facility] Day # 6 PCP Laura Mckinney DO       Subjective:   Caterina Nation is a Or    •  acetaminophen (TYLENOL) 650 MG rectal suppository 650 mg, 650 mg, Rectal, Q4H PRN    •  Labetalol HCl (TRANDATE) injection 10 mg, 10 mg, Intravenous, Q10 Min PRN    •  hydrALAzine HCl (APRESOLINE) injection 10 mg, 10 mg, Intravenous, Q2H PRN    • disease     2/25/2021 MRI, MRA Brain  CONCLUSION:  Right frontal lobe infarct extending to the deep periventricular white matter in the posterior aspect of the right frontal lobe and adjacent to the superior portion of the right basal ganglia is mildly inc

## 2021-03-01 NOTE — CARDIAC REHAB
Per md note pt meeting with hospice /comfort care.  Cardiac rehab request for stroke education not approp for ed at this time

## 2021-03-01 NOTE — CM/SW NOTE
[facility] met with pt, , and children over the weekend. GOC discussed. Pt/family wanted some time to think about hospice care.   [facility] to f/u with pt/family today at 2:30pm.

## 2021-03-01 NOTE — PROGRESS NOTES
[facility]  Cardiology Progress Note    Casandra Portland Charbel Patient Status:  Inpatient    1945 MRN [id number]   [facility] 7NE-A Attending Kenyetta Adam MD   [address] Day # 6 PCP Thierry Stephenson DO     Subjective:  Complains of diarrhea and v Hospice evaluation. SHAYY Kent  3/1/2021  10:48 AM    Patient seen and examined    I had a long discussion with the patient and his wife at bedside. She wishes to go into hospice. She feels tired.   She does not feel like she will have the e

## 2021-03-01 NOTE — PLAN OF CARE
Assumed pt care this morning. Alert, awake. C/o back pain at times.  Offered oral intake on modified diet but declines as \"when I eat, I shit\" stating \"isn't there a shot you can given me to end it\" patient/family met with hospice team and have decide

## 2021-03-01 NOTE — PLAN OF CARE
Received pt at 1930  Pt AOx4, NSR A paced, 2L NC, VSS  Neruo Q4. L sided weakness. Neuro sx unchanged  Purewick removed. Perineum very red & pt complained of pain and irritation through out the night. Maggie care performed multiple times and creams applied. the need for suctioning and perform as needed  - Assess and instruct to report SOB or any respiratory difficulty  - Respiratory Therapy support as indicated  - Manage/alleviate anxiety  - Monitor for signs/symptoms of CO2 retention  Outcome: Progressing planning if the patient needs post-hospital services based on physician/LIP order or complex needs related to functional status, cognitive ability or social support system  Outcome: Progressing     Problem: NEUROLOGICAL - ADULT  Goal: Achieves stable or im

## 2021-03-01 NOTE — PROGRESS NOTES
[facility]  Progress Note    Nancy Barger Patient Status:  Inpatient    1945 MRN [id number]   [facility] 7NE-A Attending Barbara Kuo MD   [facility] Day # 6 PCP Gemma Ireland DO     CC: Slurred speech    SUBJECTIVE:  Slurred speech a Left lower extremity 0 out of 5 power. , no meningeal signs, no cerebellar signs, sensations normal, no facial palsy.   Cranial nerves normal.   Psych: Mood and affect appears normal      Labs:   Lab Results   Component Value Date    PGLU 101 03/01/2021 major intracranial flow voids are present. MRA BRAIN  The upper cervical, petrous, cavernous, and supraclinoid internal carotid arteries are unremarkable. An anterior communicating artery is seen.        The branches of the anterior cerebral and m An anterior communicating artery is seen. Moderate stenosis in the proximal right M 2 branch is noted.   Intimal irregularity in the right M2 branches is noted The branches of the anterior cerebral and middle cerebral arteries are overall unremarkab suppository 10 mg, 10 mg, Rectal, Daily PRN    •  acetaminophen (TYLENOL) tab 650 mg, 650 mg, Oral, Q4H PRN    Or    •  acetaminophen (TYLENOL) 650 MG rectal suppository 650 mg, 650 mg, Rectal, Q4H PRN    •  Labetalol HCl (TRANDATE) injection 10 mg, 10 mg, orally and aspiration precautions  5. Patient keeps on refusing PT . Discussed with patient and  at bedside regarding PT and rehab. Patient and  wants DNR comfort care and planning to meet with hospice  3.  Diabetes mellitus type 2 with julius precautions              Quality:  · DVT Prophylaxis: SCD, subcutaneous heparin  · CODE status: Patient and  POA both decided on DNR comfort care only.   Patient keeps saying 'she wants to be let go ',  at bedside who also states that patient

## 2021-03-02 NOTE — SLP NOTE
Pt/family have made decision for hospice care. Will discharge from services at this time.      Viridiana Lovett MA, [address] [facility]  Pager

## 2021-03-02 NOTE — PLAN OF CARE
Assumed care of pt.  At 1930  A&O x 4- q4hr neuro checks  Left side weakness with left facial droop  2L nasal cannula- lungs diminished  A-paced on telemetry  SBP goal 100-140  Purewick in place- julius area excoriated- cream applied  PRNs for pain   IV abx facilitate oxygenation and minimize respiratory effort  - Oxygen supplementation based on oxygen saturation or ABGs  - Provide Smoking Cessation handout, if applicable  - Encourage broncho-pulmonary hygiene including cough, deep breathe, Incentive Spiromet

## 2021-03-02 NOTE — PLAN OF CARE
Assumed care at 0700  A&O x 3. Disoriented to time. According to family seems much weaker today than she has been. Unable to complete neuro assessment dt patient only being able to follow some commands. Patient too weak/ refusing to try and take pills.   Pa

## 2021-03-02 NOTE — HOSPICE RN NOTE
Pt less responsive than yesterday. Beto Richard relates pt was not able to take po meds as she could not swallow. Pt c/o pain 10/10 to vaginal area and R foot that she states \"it's burning\". Morphine ivp utilized prn.   Dr. Michael Rodriges present and relate

## 2021-03-02 NOTE — DIETARY NOTE
[facility] SARAH Barger     Admitting diagnosis:  Myelofibrosis [facility]) [D75.81]  Pacemaker [Z95.0]  Azotemia [R79.89]  Acute CVA (cerebrovascular accident) ([facility] [address] [I63.9]  Urinary tract infection without hematuria, site unspecifi

## 2021-03-02 NOTE — PROGRESS NOTES
[facility]  Progress Note    Lucero Barger Patient Status:  Inpatient    1945 MRN [id number]   [facility] 7NE-A Attending Tracy Barroso MD   [facility] Day # 7 PCP Tatum Catherine DO     CC: Slurred speech    SUBJECTIVE:  Slurred speech a lesions  Neurologic: Awake,alert, oriented x3, left-sided  facial droop present. Left upper extremity 0 out of 5 power. Left lower extremity 0 out of 5 power. , no meningeal signs, no cerebellar signs, sensations normal, no facial palsy.   Cranial nerves n infarction, these are stable. The visualized paranasal sinuses and mastoid air cells are unremarkable. The expected major intracranial flow voids are present.      MRA BRAIN  The upper cervical, petrous, cavernous, and supraclinoid internal caroti unremarkable. The left distal cervical segment internal carotid artery has a tortuous course with minimal fusiform prominence. An anterior communicating artery is seen. Moderate stenosis in the proximal right M 2 branch is noted.   Intimal irregu Pen (NOVOLOG) 100 UNIT/ML flexpen 1-10 Units, 1-10 Units, Subcutaneous, TID AC and HS    •  acetaminophen (TYLENOL) tab 650 mg, 650 mg, Oral, Q6H PRN    •  PEG 3350 (MIRALAX) powder packet 17 g, 17 g, Oral, Daily PRN    •  bisacodyl (DULCOLAX) rectal suppo permissive HTN due to stroke  2. Home Norvasc discontinued on 2/25[ last dose on 2/25 am]  3. On permissive HTN due to stroke  4.  Seen by speech and swallow re-eval today who recommended mechanical soft chopped diet and thin liquids by half teaspoon orally Zosyn[was on IV Rocephin for UTI which was changed to IV Zosyn to cover possible aspiration pneumonitis.]  5.  Seen by speech and swallow re-eval  who recommended mechanical soft chopped diet and thin liquids by half teaspoon orally and aspiration precautio

## 2021-03-03 NOTE — H&P
[facility]                                                               History & Physical         Buck Charlenegil Addy Patient Status:  Inpatient    1945 MRN [id number]   [facility] 7NE-A Attending Abiola Stewart MD   [facility] Day # 0 [facility]      • CABG  2018   • CATARACT     • CATH DRUG ELUTING STENT     • CATH PERCUTANEOUS  TRANSLUMINAL CORONARY ANGIOPLASTY      cardiac and peripheral   • CHOLECYSTECTOMY  '95   • FEMORAL PERIPHERAL BYPASS Right 2/10/2015    Performed by Boo Albert, reaction(s): Headache, HEADACHES  Alfuzosin               DIZZINESS  Gluten Flour            OTHER (SEE COMMENTS)    Comment:Pt feels gluten elevates her blood sugar  Lisinopril              OTHER (SEE COMMENTS)    Comment:HIGH BLOOD SUGAR             Oral out of 5 power. , no meningeal signs, no cerebellar signs, sensations normal, no facial palsy.   Cranial nerves normal.   Psych: Mood and affect appears normal      Diagnostic Data:      Laboratory Data:        Recent Labs   Lab 02/24/21  1709   PTP 13.9

## 2021-03-03 NOTE — DISCHARGE SUMMARY
[facility]  Discharge Summary    Andra Amezquita. Patient Status:  Inpatient    1945 MRN [id number]   [facility] 7NE-A Attending No att. providers found   [facility] Day # 7 PCP Carolina Hurtado DO     Date of Admission: 2021    Date of D coronal radiata and centrum semiovale. 2. MR angiogram head examination is overall unremarkable. Varying degrees of mild stenosis is likely due to atherosclerotic disease.       Seen by neurology, hematology oncology and cardiology in hospital.   Echo 0.27.    Patient was on IV antibiotics IV Rocephin for UTI since admission which was changed to IV Zosyn to cover for aspiration pneumonia.     Patient refusing PT OT or tube feeding and patient wants to be comfort care only, patient's  at bedside wh

## 2021-03-03 NOTE — PROGRESS NOTES
[facility]  Progress Note     Emma Barger Patient Status:  Inpatient    1945 MRN [id number]   [facility] 7NE-A Attending Anatoly Mcrae MD   Hosp Day # 1 PCP Sabi Rodrigues DO     Chief Complaint: Hospice care    S: Patient unr appears comfortable on continuous morphine infusion. 2. Acute CVA  3. HTN  4. DM2  5. UTI  6. Myelofibrosis  7. CAD s/p CABG    Plan of care: As above    Plan of care discussed with .     Rylee Bagley MD

## 2021-03-03 NOTE — PROGRESS NOTES
Assumed care of pt. At [address] [facility]. Unresponsive. Pt. Glances with eyes when spoken to. Able to move her right arm. Left side flaccid with left facial droop. Had a BM this evening. Morphine gtt started this evening. Schwab in place for comfort.   2 L nasa

## 2021-03-03 NOTE — SPIRITUAL CARE NOTE
Supportive visit to assess for emotional/spirtual care need. Pt is peaceful and comfortable. Pt spouse and son at bedside and appear to be coping ok at this time.

## 2021-03-03 NOTE — HOSPICE RN NOTE
GIP Day 2. Received patient in bed. ROGELIO Loaiza at bedside. Questions and concerns addressed and support provided. Patient is unresponsive to verbal and tactile stimulus. No objective signs of distress noted. Since [facility] patient had change in condition.  Morphine

## 2021-03-03 NOTE — PROGRESS NOTES
03/03/21 1306   Clinical Encounter Type   Visited With Patient not available   Routine Visit   (Responded to the consult/request received)    attempted to visit; however, patient appeared to be sleeping and family not present.  Quietly offered pr

## 2021-03-04 NOTE — PROGRESS NOTES
[facility]  Progress Note     Edith Levar Barger Patient Status:  Inpatient    1945 MRN [id number]   [facility] 7NE-A Attending Tigist Lemos MD   [facility] Day # 2 PCP Laura Mckinney DO     Chief Complaint: Hospice care    S: Patient unr comfortable on continuous morphine infusion. Might require increased dosing later today, depending on respiratory rate. 2. Acute CVA  3. HTN  4. DM2  5. UTI  6. Myelofibrosis  7.  CAD s/p CABG    Plan of care: As above    Plan of care discussed with [facility]

## 2021-03-04 NOTE — HOSPICE RN NOTE
[facility] nursing rounds. Received patient in bed. No family at bedside at this time. Patient currently on morphine continuous infusion at 1mg/hr. No objective signs of distress noted at this time.  Discussed hospice POC with hospital nurse April Del Valle

## 2021-03-04 NOTE — HOSPICE RN NOTE
GIP d.3. Pt continues unresponsive; RR irregular, about 6, clear, 30 sec apnea and continues to require MS drip to maintain comfort. No s/s of pain. Will continue GIP for monitoring and need for titration of meds to maintain comfort.    Kim Cardoza

## 2021-03-04 NOTE — PLAN OF CARE
Pt comfortable  Morphine gtt turned to 2mg/hr.  1mg bolus given x1  Family at bedside  Changed as needed

## 2021-03-04 NOTE — CM/SW NOTE
YUMIKO met with pt and  . Shared memories of his wife. Pt appears imminent . Periods of apnea up to 30 seconds.  . Informed lucy Shen

## 2021-03-04 NOTE — HOSPICE RN NOTE
[facility] nursing rounds. 2 family members at bedside. Questions and concerns addressed. Support provided. End of life education provided. Patient appears actively dying. No objective signs of distress noted.  Currently on morphine continuous infu

## 2021-03-05 NOTE — CM/SW NOTE
SW met with pt and  at bedside. Pt was unresponsive. Spoke with spouse about [employer]. Assisted with questions about the paperwork. Provided support . Educated on eol signs and symptoms.

## 2021-03-05 NOTE — PLAN OF CARE
Pt kept comfortable throughout the night.   Morphine gtt infusing at 2mg/hr  VSS, 1L NC   spent night

## 2021-03-05 NOTE — HOSPICE RN NOTE
[facility] GIP Day 4 nursing rounds. Several family members at bedside. Support provided and encouraged family to call for assistance if needed. Patient with no obvious signs of distress noted. Continues on morphine gtt at 2mg/hr.  Remains appropriate for GIP level

## 2021-03-05 NOTE — HOSPICE RN NOTE
GIP Day 4. Patient remains unresponsive. On morphine gtt currently at 2mg/hr. Was titrated up yesterday to maintain comfort. Respiratory rate is 5, irregular, periods of apnea up to 25 seconds noted. Breathing shallow with diminished lung sounds.  No object

## 2021-03-05 NOTE — PROGRESS NOTES
[facility]  Progress Note     Andrew Barger Patient Status:  Inpatient    1945 MRN [id number]   [facility] 7NE-A Attending Roz Perez MD   [facility] Day # 3 PCP Smith Baptiste,      Chief Complaint: Hospice care    S: Patient unr continuous morphine infusion. Agree with current morphine dose. 2. Acute CVA  3. HTN  4. DM2  5. UTI  6. Myelofibrosis  7. CAD s/p CABG    Plan of care: As above    Plan of care discussed with RN.     Den Telles MD

## 2021-03-06 NOTE — HOSPICE RN NOTE
GIP day 5. Unresponsive. Respirations are slow and deep with periods of apnea. Pt appears to be comfortable.  in room. States prior to starting MS drip pt was screaming in pain. Pain appears to be managed on 2mg of MS. No pain noted.   Color p

## 2021-03-06 NOTE — PLAN OF CARE
Assumed patient care at [address] [facility],Third [address]; resting comfortably; no signs of pain; spouse at bedside in agreement  1L NC; RR 4-6  Schwab intact  Morphine gtt infusing per order    Plan of care discussed with spouse at bedside.

## 2021-03-06 NOTE — PLAN OF CARE
Assumed care at 0700. Pt is unresponsive. 1L O2 per NC. Relaxed and comfortable, no signs of pain. Schwab with little output. BM x1. Repositioned frequently. Morphine gtt infusing per order. Pt  at the bedside, needs attended to.

## 2021-03-06 NOTE — PROGRESS NOTES
[facility]  Progress Note     Sahil Zelaya Addy Patient Status:  Inpatient    1945 MRN [id number]   [facility] 7NE-A Attending Kim Sam MD   [facility] Day # 4 PCP Saman Wells DO     Chief Complaint: Hospice care    S: Patient unr unresponsive, appears comfortable on continuous morphine infusion. Agree with current morphine dose. 2. Acute CVA  3. HTN  4. DM2  5. UTI  6. Myelofibrosis  7. CAD s/p CABG    Plan of care: As above    Plan of care discussed with .     Hernesto Gilbert Loveless

## 2021-03-07 NOTE — DISCHARGE SUMMARY
[facility]ITAL[facility]  DISCHARGE SUMMARY     Andra Joyner CANDELARIO Amezquita. Patient Status:  Inpatient    1945 MRN [id number]   [facility] 7NE-A Attending Kelsy Oliva MD   [facility] Day # 5 PCP Peace Kauffman DO     Date of Admission: 3/2/2021  Date of [address] [facility]

## 2021-03-07 NOTE — PROGRESS NOTES
Assumed care at [address] [place] [address],Third Floor, appears comfortable  Morphine gtt infusing  On 1L O2 per NC, spO2 90's, RR 3-4/min  Schwab patent with little output   at bedside  Will continue to monitor none

## 2021-03-07 NOTE — PLAN OF CARE
0800:  Hospice with goals of care being comfort  Irregular breathing  Slowing HR  Mottled from feet to thighs  MS PCA @2mg/hr      0903:  Time of death [phone], verified with charge nurse Daniela Olsen  Primary dr Max Jones notified  [facility] hospice notified  [place]

## 2021-03-08 ENCOUNTER — APPOINTMENT (OUTPATIENT)
Dept: HEMATOLOGY/ONCOLOGY | Facility: HOSPITAL | Age: 76
End: 2021-03-08
Attending: INTERNAL MEDICINE
Payer: MEDICARE

## 2022-10-23 NOTE — PROGRESS NOTES
Heme/Onc Progress Note - [facility]      Chief Complaint:    Follow up for evaluation and management of polycythemia vera and acute CVA. Interim History:      Patient has left sided weakness. Her  is at the bedside.  The patient has decided to proceed w Patient placed in Droplet isolation. Patient educated on isolation and questions answered.   bilaterally which may represent mild edema. 3. Possible small left-sided pleural effusion.         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
